# Patient Record
Sex: MALE | Race: WHITE | NOT HISPANIC OR LATINO | Employment: OTHER | URBAN - METROPOLITAN AREA
[De-identification: names, ages, dates, MRNs, and addresses within clinical notes are randomized per-mention and may not be internally consistent; named-entity substitution may affect disease eponyms.]

---

## 2019-12-29 ENCOUNTER — HOSPITAL ENCOUNTER (EMERGENCY)
Facility: HOSPITAL | Age: 54
Discharge: ANOTHER HOSPITAL | End: 2019-12-29
Attending: FAMILY MEDICINE

## 2019-12-29 ENCOUNTER — HOSPITAL ENCOUNTER (INPATIENT)
Facility: HOSPITAL | Age: 54
LOS: 5 days | Discharge: HOME | DRG: 286 | End: 2020-01-03
Attending: EMERGENCY MEDICINE | Admitting: FAMILY MEDICINE

## 2019-12-29 ENCOUNTER — CLINICAL SUPPORT (OUTPATIENT)
Dept: CARDIOLOGY | Facility: HOSPITAL | Age: 54
DRG: 286 | End: 2019-12-29
Attending: FAMILY MEDICINE

## 2019-12-29 VITALS
DIASTOLIC BLOOD PRESSURE: 96 MMHG | RESPIRATION RATE: 26 BRPM | HEART RATE: 103 BPM | WEIGHT: 267 LBS | OXYGEN SATURATION: 94 % | HEIGHT: 71 IN | TEMPERATURE: 99 F | BODY MASS INDEX: 37.38 KG/M2 | SYSTOLIC BLOOD PRESSURE: 134 MMHG

## 2019-12-29 DIAGNOSIS — I50.30 (HFPEF) HEART FAILURE WITH PRESERVED EJECTION FRACTION: ICD-10-CM

## 2019-12-29 DIAGNOSIS — I48.91 ATRIAL FIBRILLATION, UNSPECIFIED TYPE: ICD-10-CM

## 2019-12-29 DIAGNOSIS — I50.9 ACUTE CONGESTIVE HEART FAILURE, UNSPECIFIED HEART FAILURE TYPE: Primary | ICD-10-CM

## 2019-12-29 DIAGNOSIS — I48.91 ATRIAL FIBRILLATION: ICD-10-CM

## 2019-12-29 DIAGNOSIS — I21.4 NSTEMI (NON-ST ELEVATED MYOCARDIAL INFARCTION): Primary | ICD-10-CM

## 2019-12-29 DIAGNOSIS — I48.91 ATRIAL FIBRILLATION WITH RVR: ICD-10-CM

## 2019-12-29 DIAGNOSIS — I10 HYPERTENSION, UNSPECIFIED TYPE: ICD-10-CM

## 2019-12-29 DIAGNOSIS — R73.9 HYPERGLYCEMIA: ICD-10-CM

## 2019-12-29 DIAGNOSIS — I48.91 A-FIB: ICD-10-CM

## 2019-12-29 DIAGNOSIS — R07.9 CHEST PAIN: ICD-10-CM

## 2019-12-29 DIAGNOSIS — I16.1 HYPERTENSIVE EMERGENCY: ICD-10-CM

## 2019-12-29 DIAGNOSIS — R07.9 CHEST PAIN, UNSPECIFIED TYPE: ICD-10-CM

## 2019-12-29 PROBLEM — Z72.0 TOBACCO ABUSE: Status: ACTIVE | Noted: 2019-12-29

## 2019-12-29 PROBLEM — E11.9 NEWLY DIAGNOSED DIABETES: Status: ACTIVE | Noted: 2019-12-29

## 2019-12-29 PROBLEM — G47.33 OSA (OBSTRUCTIVE SLEEP APNEA): Status: ACTIVE | Noted: 2019-12-29

## 2019-12-29 PROBLEM — K59.00 CONSTIPATION: Status: ACTIVE | Noted: 2019-12-29

## 2019-12-29 PROBLEM — R79.89 ELEVATED TROPONIN: Status: ACTIVE | Noted: 2019-12-29

## 2019-12-29 LAB
ACETONE BLD-MCNC: NEGATIVE MG/DL
ALBUMIN SERPL BCP-MCNC: 3.5 G/DL (ref 3.5–5.2)
ALBUMIN SERPL BCP-MCNC: 3.5 G/DL (ref 3.5–5.2)
ALLENS TEST: ABNORMAL
ALLENS TEST: ABNORMAL
ALP SERPL-CCNC: 66 U/L (ref 55–135)
ALP SERPL-CCNC: 72 U/L (ref 55–135)
ALT SERPL W/O P-5'-P-CCNC: 31 U/L (ref 10–44)
ALT SERPL W/O P-5'-P-CCNC: 32 U/L (ref 10–44)
AMPHET+METHAMPHET UR QL: NEGATIVE
ANION GAP SERPL CALC-SCNC: 9 MMOL/L (ref 8–16)
ANION GAP SERPL CALC-SCNC: 9 MMOL/L (ref 8–16)
AORTIC ROOT ANNULUS: 4.35 CM
AORTIC VALVE CUSP SEPERATION: 2.11 CM
APTT PPP: 30.7 SEC (ref 23.6–33.3)
AST SERPL-CCNC: 22 U/L (ref 10–40)
AST SERPL-CCNC: 25 U/L (ref 10–40)
AV INDEX (PROSTH): 0.76
AV MEAN GRADIENT: 3 MMHG
AV PEAK GRADIENT: 7 MMHG
AV VALVE AREA: 2.76 CM2
AV VELOCITY RATIO: 67.99
B-OH-BUTYR BLD STRIP-SCNC: 0.1 MMOL/L (ref 0–0.5)
BACTERIA #/AREA URNS HPF: NORMAL /HPF
BARBITURATES UR QL SCN>200 NG/ML: NEGATIVE
BASOPHILS # BLD AUTO: 0.01 K/UL (ref 0–0.2)
BASOPHILS # BLD AUTO: 0.02 K/UL (ref 0–0.2)
BASOPHILS NFR BLD: 0.1 % (ref 0–1.9)
BASOPHILS NFR BLD: 0.3 % (ref 0–1.9)
BENZODIAZ UR QL SCN>200 NG/ML: NEGATIVE
BILIRUB SERPL-MCNC: 0.6 MG/DL (ref 0.1–1)
BILIRUB SERPL-MCNC: 0.8 MG/DL (ref 0.1–1)
BILIRUB UR QL STRIP: NEGATIVE
BILIRUB UR QL STRIP: NEGATIVE
BNP SERPL-MCNC: 1032 PG/ML (ref 0–99)
BSA FOR ECHO PROCEDURE: 2.46 M2
BUN SERPL-MCNC: 21 MG/DL (ref 6–20)
BUN SERPL-MCNC: 22 MG/DL (ref 6–20)
BZE UR QL SCN: NEGATIVE
CALCIUM SERPL-MCNC: 8.6 MG/DL (ref 8.7–10.5)
CALCIUM SERPL-MCNC: 8.8 MG/DL (ref 8.7–10.5)
CANNABINOIDS UR QL SCN: NORMAL
CHLORIDE SERPL-SCNC: 101 MMOL/L (ref 95–110)
CHLORIDE SERPL-SCNC: 97 MMOL/L (ref 95–110)
CLARITY UR: CLEAR
CLARITY UR: CLEAR
CO2 SERPL-SCNC: 26 MMOL/L (ref 23–29)
CO2 SERPL-SCNC: 27 MMOL/L (ref 23–29)
COLOR UR: YELLOW
COLOR UR: YELLOW
CREAT SERPL-MCNC: 1.4 MG/DL (ref 0.5–1.4)
CREAT SERPL-MCNC: 1.4 MG/DL (ref 0.5–1.4)
CREAT UR-MCNC: 43.6 MG/DL (ref 23–375)
CV ECHO LV RWT: 0.59 CM
DELSYS: ABNORMAL
DELSYS: ABNORMAL
DIFFERENTIAL METHOD: ABNORMAL
DIFFERENTIAL METHOD: ABNORMAL
DOP CALC AO PEAK VEL: 1.3 M/S
DOP CALC AO VTI: 18.86 CM
DOP CALC LVOT AREA: 3.6 CM2
DOP CALC LVOT DIAMETER: 2.15 CM
DOP CALC LVOT PEAK VEL: 88.39 M/S
DOP CALC LVOT STROKE VOLUME: 51.96 CM3
DOP CALCLVOT PEAK VEL VTI: 14.32 CM
E WAVE DECELERATION TIME: 108.69 MSEC
E/E' RATIO: 15.86 M/S
ECHO LV POSTERIOR WALL: 1.77 CM (ref 0.6–1.1)
EOSINOPHIL # BLD AUTO: 0 K/UL (ref 0–0.5)
EOSINOPHIL # BLD AUTO: 0.1 K/UL (ref 0–0.5)
EOSINOPHIL NFR BLD: 0.4 % (ref 0–8)
EOSINOPHIL NFR BLD: 0.7 % (ref 0–8)
ERYTHROCYTE [DISTWIDTH] IN BLOOD BY AUTOMATED COUNT: 13.9 % (ref 11.5–14.5)
ERYTHROCYTE [DISTWIDTH] IN BLOOD BY AUTOMATED COUNT: 14 % (ref 11.5–14.5)
EST. GFR  (AFRICAN AMERICAN): >60 ML/MIN/1.73 M^2
EST. GFR  (AFRICAN AMERICAN): >60 ML/MIN/1.73 M^2
EST. GFR  (NON AFRICAN AMERICAN): 55.8 ML/MIN/1.73 M^2
EST. GFR  (NON AFRICAN AMERICAN): 55.8 ML/MIN/1.73 M^2
ESTIMATED AVG GLUCOSE: 335 MG/DL (ref 68–131)
ESTIMATED AVG GLUCOSE: 335 MG/DL (ref 68–131)
FIO2: 40
FLOW: 15
FRACTIONAL SHORTENING: 9 % (ref 28–44)
GLUCOSE SERPL-MCNC: 102 MG/DL (ref 70–110)
GLUCOSE SERPL-MCNC: 103 MG/DL (ref 70–110)
GLUCOSE SERPL-MCNC: 134 MG/DL (ref 70–110)
GLUCOSE SERPL-MCNC: 143 MG/DL (ref 70–110)
GLUCOSE SERPL-MCNC: 182 MG/DL (ref 70–110)
GLUCOSE SERPL-MCNC: 192 MG/DL (ref 70–110)
GLUCOSE SERPL-MCNC: 218 MG/DL (ref 70–110)
GLUCOSE SERPL-MCNC: 240 MG/DL (ref 70–110)
GLUCOSE SERPL-MCNC: 259 MG/DL (ref 70–110)
GLUCOSE SERPL-MCNC: 269 MG/DL (ref 70–110)
GLUCOSE SERPL-MCNC: 567 MG/DL (ref 70–110)
GLUCOSE UR QL STRIP: ABNORMAL
GLUCOSE UR QL STRIP: ABNORMAL
HBA1C MFR BLD HPLC: 13.3 % (ref 4.5–6.2)
HBA1C MFR BLD HPLC: 13.3 % (ref 4.5–6.2)
HCO3 UR-SCNC: 27.3 MMOL/L (ref 24–28)
HCO3 UR-SCNC: 27.8 MMOL/L (ref 24–28)
HCT VFR BLD AUTO: 38.4 % (ref 40–54)
HCT VFR BLD AUTO: 39.2 % (ref 40–54)
HGB BLD-MCNC: 12.4 G/DL (ref 14–18)
HGB BLD-MCNC: 12.4 G/DL (ref 14–18)
HGB UR QL STRIP: ABNORMAL
HGB UR QL STRIP: NEGATIVE
IMM GRANULOCYTES # BLD AUTO: 0.02 K/UL (ref 0–0.04)
IMM GRANULOCYTES # BLD AUTO: 0.03 K/UL (ref 0–0.04)
IMM GRANULOCYTES NFR BLD AUTO: 0.3 % (ref 0–0.5)
IMM GRANULOCYTES NFR BLD AUTO: 0.4 % (ref 0–0.5)
INFLUENZA A, MOLECULAR: NEGATIVE
INFLUENZA B, MOLECULAR: NEGATIVE
INR PPP: 1.2
INTERVENTRICULAR SEPTUM: 1.77 CM (ref 0.6–1.1)
KETONES UR QL STRIP: NEGATIVE
KETONES UR QL STRIP: NEGATIVE
LEFT ATRIUM SIZE: 4.05 CM
LEFT INTERNAL DIMENSION IN SYSTOLE: 5.44 CM (ref 2.1–4)
LEFT VENTRICLE DIASTOLIC VOLUME INDEX: 91.26 ML/M2
LEFT VENTRICLE DIASTOLIC VOLUME: 217.6 ML
LEFT VENTRICLE MASS INDEX: 227 G/M2
LEFT VENTRICLE SYSTOLIC VOLUME INDEX: 75.7 ML/M2
LEFT VENTRICLE SYSTOLIC VOLUME: 180.6 ML
LEFT VENTRICULAR INTERNAL DIMENSION IN DIASTOLE: 5.99 CM (ref 3.5–6)
LEFT VENTRICULAR MASS: 541.9 G
LEUKOCYTE ESTERASE UR QL STRIP: NEGATIVE
LEUKOCYTE ESTERASE UR QL STRIP: NEGATIVE
LV LATERAL E/E' RATIO: 13.88 M/S
LV SEPTAL E/E' RATIO: 18.5 M/S
LYMPHOCYTES # BLD AUTO: 1.2 K/UL (ref 1–4.8)
LYMPHOCYTES # BLD AUTO: 1.3 K/UL (ref 1–4.8)
LYMPHOCYTES NFR BLD: 15.4 % (ref 18–48)
LYMPHOCYTES NFR BLD: 17.3 % (ref 18–48)
MAGNESIUM SERPL-MCNC: 1.7 MG/DL (ref 1.6–2.6)
MAGNESIUM SERPL-MCNC: 1.8 MG/DL (ref 1.6–2.6)
MCH RBC QN AUTO: 25.9 PG (ref 27–31)
MCH RBC QN AUTO: 26.3 PG (ref 27–31)
MCHC RBC AUTO-ENTMCNC: 31.6 G/DL (ref 32–36)
MCHC RBC AUTO-ENTMCNC: 32.3 G/DL (ref 32–36)
MCV RBC AUTO: 82 FL (ref 82–98)
MCV RBC AUTO: 82 FL (ref 82–98)
MICROSCOPIC COMMENT: NORMAL
MODE: ABNORMAL
MONOCYTES # BLD AUTO: 0.6 K/UL (ref 0.3–1)
MONOCYTES # BLD AUTO: 0.6 K/UL (ref 0.3–1)
MONOCYTES NFR BLD: 7.4 % (ref 4–15)
MONOCYTES NFR BLD: 8.1 % (ref 4–15)
MV PEAK E VEL: 1.11 M/S
NEUTROPHILS # BLD AUTO: 5.5 K/UL (ref 1.8–7.7)
NEUTROPHILS # BLD AUTO: 5.7 K/UL (ref 1.8–7.7)
NEUTROPHILS NFR BLD: 74 % (ref 38–73)
NEUTROPHILS NFR BLD: 75.6 % (ref 38–73)
NITRITE UR QL STRIP: NEGATIVE
NITRITE UR QL STRIP: NEGATIVE
NRBC BLD-RTO: 0 /100 WBC
NRBC BLD-RTO: 0 /100 WBC
OPIATES UR QL SCN: NEGATIVE
PCO2 BLDA: 44.5 MMHG (ref 35–45)
PCO2 BLDA: 49.3 MMHG (ref 35–45)
PCP UR QL SCN>25 NG/ML: NEGATIVE
PH SMN: 7.35 [PH] (ref 7.35–7.45)
PH SMN: 7.4 [PH] (ref 7.35–7.45)
PH UR STRIP: 6 [PH] (ref 5–8)
PH UR STRIP: 6 [PH] (ref 5–8)
PHOSPHATE SERPL-MCNC: 3.6 MG/DL (ref 2.7–4.5)
PISA TR MAX VEL: 2.59 M/S
PLATELET # BLD AUTO: 168 K/UL (ref 150–350)
PLATELET # BLD AUTO: 171 K/UL (ref 150–350)
PMV BLD AUTO: 13.6 FL (ref 9.2–12.9)
PMV BLD AUTO: ABNORMAL FL (ref 9.2–12.9)
PO2 BLDA: 74 MMHG (ref 80–100)
PO2 BLDA: 84 MMHG (ref 80–100)
POC BE: 2 MMOL/L
POC BE: 3 MMOL/L
POC SATURATED O2: 95 % (ref 95–100)
POC SATURATED O2: 96 % (ref 95–100)
POC TCO2: 29 MMOL/L (ref 23–27)
POC TCO2: 29 MMOL/L (ref 23–27)
POCT GLUCOSE: 184 MG/DL (ref 70–110)
POCT GLUCOSE: 455 MG/DL (ref 70–110)
POTASSIUM SERPL-SCNC: 3.3 MMOL/L (ref 3.5–5.1)
POTASSIUM SERPL-SCNC: 3.6 MMOL/L (ref 3.5–5.1)
POTASSIUM SERPL-SCNC: 4 MMOL/L (ref 3.5–5.1)
POTASSIUM SERPL-SCNC: 4.3 MMOL/L (ref 3.5–5.1)
PROT SERPL-MCNC: 6.7 G/DL (ref 6–8.4)
PROT SERPL-MCNC: 7 G/DL (ref 6–8.4)
PROT UR QL STRIP: NEGATIVE
PROT UR QL STRIP: NEGATIVE
PROTHROMBIN TIME: 14.9 SEC (ref 10.6–14.8)
RA PRESSURE: 8 MMHG
RBC # BLD AUTO: 4.71 M/UL (ref 4.6–6.2)
RBC # BLD AUTO: 4.79 M/UL (ref 4.6–6.2)
SAMPLE: ABNORMAL
SAMPLE: ABNORMAL
SITE: ABNORMAL
SITE: ABNORMAL
SODIUM SERPL-SCNC: 132 MMOL/L (ref 136–145)
SODIUM SERPL-SCNC: 137 MMOL/L (ref 136–145)
SP GR UR STRIP: 1.01 (ref 1–1.03)
SP GR UR STRIP: 1.02 (ref 1–1.03)
SPECIMEN SOURCE: NORMAL
SQUAMOUS #/AREA URNS HPF: 1 /HPF
TDI LATERAL: 0.08 M/S
TDI SEPTAL: 0.06 M/S
TDI: 0.07 M/S
TOXICOLOGY INFORMATION: NORMAL
TR MAX PG: 27 MMHG
TROPONIN I SERPL DL<=0.01 NG/ML-MCNC: 0.37 NG/ML
TROPONIN I SERPL DL<=0.01 NG/ML-MCNC: 0.39 NG/ML
TROPONIN I SERPL DL<=0.01 NG/ML-MCNC: 0.4 NG/ML (ref 0.02–0.5)
TROPONIN I SERPL DL<=0.01 NG/ML-MCNC: 0.43 NG/ML (ref 0.02–0.5)
TSH SERPL DL<=0.005 MIU/L-ACNC: 3.44 UIU/ML (ref 0.34–5.6)
TV REST PULMONARY ARTERY PRESSURE: 35 MMHG
URN SPEC COLLECT METH UR: ABNORMAL
URN SPEC COLLECT METH UR: ABNORMAL
UROBILINOGEN UR STRIP-ACNC: NEGATIVE EU/DL
UROBILINOGEN UR STRIP-ACNC: NEGATIVE EU/DL
WBC # BLD AUTO: 7.44 K/UL (ref 3.9–12.7)
WBC # BLD AUTO: 7.54 K/UL (ref 3.9–12.7)
YEAST URNS QL MICRO: NORMAL

## 2019-12-29 PROCEDURE — 99900035 HC TECH TIME PER 15 MIN (STAT)

## 2019-12-29 PROCEDURE — 84484 ASSAY OF TROPONIN QUANT: CPT | Mod: 91

## 2019-12-29 PROCEDURE — 25000003 PHARM REV CODE 250: Performed by: FAMILY MEDICINE

## 2019-12-29 PROCEDURE — 63600175 PHARM REV CODE 636 W HCPCS: Performed by: EMERGENCY MEDICINE

## 2019-12-29 PROCEDURE — 99285 EMERGENCY DEPT VISIT HI MDM: CPT | Mod: 25

## 2019-12-29 PROCEDURE — 82010 KETONE BODYS QUAN: CPT

## 2019-12-29 PROCEDURE — 84132 ASSAY OF SERUM POTASSIUM: CPT | Mod: 91

## 2019-12-29 PROCEDURE — 83880 ASSAY OF NATRIURETIC PEPTIDE: CPT

## 2019-12-29 PROCEDURE — 87205 SMEAR GRAM STAIN: CPT

## 2019-12-29 PROCEDURE — 93005 ELECTROCARDIOGRAM TRACING: CPT

## 2019-12-29 PROCEDURE — 85025 COMPLETE CBC W/AUTO DIFF WBC: CPT | Mod: 91

## 2019-12-29 PROCEDURE — 94761 N-INVAS EAR/PLS OXIMETRY MLT: CPT

## 2019-12-29 PROCEDURE — 85025 COMPLETE CBC W/AUTO DIFF WBC: CPT

## 2019-12-29 PROCEDURE — 85730 THROMBOPLASTIN TIME PARTIAL: CPT

## 2019-12-29 PROCEDURE — 25000003 PHARM REV CODE 250

## 2019-12-29 PROCEDURE — 82803 BLOOD GASES ANY COMBINATION: CPT

## 2019-12-29 PROCEDURE — 71045 X-RAY EXAM CHEST 1 VIEW: CPT | Mod: 26,,, | Performed by: RADIOLOGY

## 2019-12-29 PROCEDURE — 36415 COLL VENOUS BLD VENIPUNCTURE: CPT

## 2019-12-29 PROCEDURE — 82962 GLUCOSE BLOOD TEST: CPT

## 2019-12-29 PROCEDURE — 84443 ASSAY THYROID STIM HORMONE: CPT

## 2019-12-29 PROCEDURE — 93306 TTE W/DOPPLER COMPLETE: CPT

## 2019-12-29 PROCEDURE — 71045 X-RAY EXAM CHEST 1 VIEW: CPT | Mod: TC,FY

## 2019-12-29 PROCEDURE — 80307 DRUG TEST PRSMV CHEM ANLYZR: CPT

## 2019-12-29 PROCEDURE — 96374 THER/PROPH/DIAG INJ IV PUSH: CPT

## 2019-12-29 PROCEDURE — 84132 ASSAY OF SERUM POTASSIUM: CPT

## 2019-12-29 PROCEDURE — 63600175 PHARM REV CODE 636 W HCPCS: Performed by: FAMILY MEDICINE

## 2019-12-29 PROCEDURE — 25000003 PHARM REV CODE 250: Performed by: EMERGENCY MEDICINE

## 2019-12-29 PROCEDURE — 74176 CT ABDOMEN PELVIS WITHOUT CONTRAST: ICD-10-PCS | Mod: 26,,, | Performed by: RADIOLOGY

## 2019-12-29 PROCEDURE — 96375 TX/PRO/DX INJ NEW DRUG ADDON: CPT

## 2019-12-29 PROCEDURE — 96376 TX/PRO/DX INJ SAME DRUG ADON: CPT

## 2019-12-29 PROCEDURE — 84100 ASSAY OF PHOSPHORUS: CPT

## 2019-12-29 PROCEDURE — 96361 HYDRATE IV INFUSION ADD-ON: CPT

## 2019-12-29 PROCEDURE — 85610 PROTHROMBIN TIME: CPT

## 2019-12-29 PROCEDURE — 83735 ASSAY OF MAGNESIUM: CPT

## 2019-12-29 PROCEDURE — 81000 URINALYSIS NONAUTO W/SCOPE: CPT | Mod: 59

## 2019-12-29 PROCEDURE — 71045 XR CHEST AP PORTABLE: ICD-10-PCS | Mod: 26,,, | Performed by: RADIOLOGY

## 2019-12-29 PROCEDURE — 20000000 HC ICU ROOM

## 2019-12-29 PROCEDURE — 74176 CT ABD & PELVIS W/O CONTRAST: CPT | Mod: TC

## 2019-12-29 PROCEDURE — C9399 UNCLASSIFIED DRUGS OR BIOLOG: HCPCS | Performed by: FAMILY MEDICINE

## 2019-12-29 PROCEDURE — 99900026 HC AIRWAY MAINTENANCE (STAT)

## 2019-12-29 PROCEDURE — 27000221 HC OXYGEN, UP TO 24 HOURS

## 2019-12-29 PROCEDURE — 80053 COMPREHEN METABOLIC PANEL: CPT | Mod: 91

## 2019-12-29 PROCEDURE — 80053 COMPREHEN METABOLIC PANEL: CPT

## 2019-12-29 PROCEDURE — 87070 CULTURE OTHR SPECIMN AEROBIC: CPT

## 2019-12-29 PROCEDURE — 87502 INFLUENZA DNA AMP PROBE: CPT

## 2019-12-29 PROCEDURE — 96365 THER/PROPH/DIAG IV INF INIT: CPT

## 2019-12-29 PROCEDURE — 74176 CT ABD & PELVIS W/O CONTRAST: CPT | Mod: 26,,, | Performed by: RADIOLOGY

## 2019-12-29 PROCEDURE — 82009 KETONE BODYS QUAL: CPT

## 2019-12-29 PROCEDURE — 63600175 PHARM REV CODE 636 W HCPCS: Performed by: INTERNAL MEDICINE

## 2019-12-29 PROCEDURE — 81003 URINALYSIS AUTO W/O SCOPE: CPT

## 2019-12-29 PROCEDURE — 96372 THER/PROPH/DIAG INJ SC/IM: CPT

## 2019-12-29 PROCEDURE — 83036 HEMOGLOBIN GLYCOSYLATED A1C: CPT

## 2019-12-29 PROCEDURE — 96366 THER/PROPH/DIAG IV INF ADDON: CPT

## 2019-12-29 PROCEDURE — 36600 WITHDRAWAL OF ARTERIAL BLOOD: CPT

## 2019-12-29 RX ORDER — MORPHINE SULFATE 4 MG/ML
2 INJECTION, SOLUTION INTRAMUSCULAR; INTRAVENOUS
Status: COMPLETED | OUTPATIENT
Start: 2019-12-29 | End: 2019-12-29

## 2019-12-29 RX ORDER — IBUPROFEN 200 MG
24 TABLET ORAL
Status: DISCONTINUED | OUTPATIENT
Start: 2019-12-29 | End: 2020-01-02

## 2019-12-29 RX ORDER — POTASSIUM CHLORIDE 7.45 MG/ML
60 INJECTION INTRAVENOUS
Status: DISCONTINUED | OUTPATIENT
Start: 2019-12-29 | End: 2020-01-03 | Stop reason: HOSPADM

## 2019-12-29 RX ORDER — ONDANSETRON 2 MG/ML
4 INJECTION INTRAMUSCULAR; INTRAVENOUS EVERY 8 HOURS PRN
Status: DISCONTINUED | OUTPATIENT
Start: 2019-12-29 | End: 2020-01-03 | Stop reason: HOSPADM

## 2019-12-29 RX ORDER — FUROSEMIDE 10 MG/ML
60 INJECTION INTRAMUSCULAR; INTRAVENOUS
Status: COMPLETED | OUTPATIENT
Start: 2019-12-29 | End: 2019-12-29

## 2019-12-29 RX ORDER — LANOLIN ALCOHOL/MO/W.PET/CERES
800 CREAM (GRAM) TOPICAL
Status: DISCONTINUED | OUTPATIENT
Start: 2019-12-29 | End: 2020-01-03 | Stop reason: HOSPADM

## 2019-12-29 RX ORDER — POTASSIUM CHLORIDE 7.45 MG/ML
80 INJECTION INTRAVENOUS
Status: DISCONTINUED | OUTPATIENT
Start: 2019-12-29 | End: 2020-01-03 | Stop reason: HOSPADM

## 2019-12-29 RX ORDER — METOPROLOL SUCCINATE 25 MG/1
25 TABLET, EXTENDED RELEASE ORAL DAILY
Status: DISCONTINUED | OUTPATIENT
Start: 2019-12-29 | End: 2019-12-31

## 2019-12-29 RX ORDER — POTASSIUM CHLORIDE 20 MEQ/15ML
60 SOLUTION ORAL
Status: DISCONTINUED | OUTPATIENT
Start: 2019-12-29 | End: 2020-01-03 | Stop reason: HOSPADM

## 2019-12-29 RX ORDER — GLUCAGON 1 MG
1 KIT INJECTION
Status: DISCONTINUED | OUTPATIENT
Start: 2019-12-29 | End: 2020-01-02

## 2019-12-29 RX ORDER — ENOXAPARIN SODIUM 300 MG/3ML
1 INJECTION INTRAVENOUS; SUBCUTANEOUS
Status: COMPLETED | OUTPATIENT
Start: 2019-12-29 | End: 2019-12-29

## 2019-12-29 RX ORDER — ACETAMINOPHEN 325 MG/1
650 TABLET ORAL EVERY 4 HOURS PRN
Status: DISCONTINUED | OUTPATIENT
Start: 2019-12-29 | End: 2020-01-03 | Stop reason: HOSPADM

## 2019-12-29 RX ORDER — POTASSIUM CHLORIDE 7.45 MG/ML
40 INJECTION INTRAVENOUS
Status: DISCONTINUED | OUTPATIENT
Start: 2019-12-29 | End: 2020-01-03 | Stop reason: HOSPADM

## 2019-12-29 RX ORDER — DILTIAZEM HYDROCHLORIDE 5 MG/ML
10 INJECTION INTRAVENOUS
Status: COMPLETED | OUTPATIENT
Start: 2019-12-29 | End: 2019-12-29

## 2019-12-29 RX ORDER — SODIUM,POTASSIUM PHOSPHATES 280-250MG
2 POWDER IN PACKET (EA) ORAL
Status: DISCONTINUED | OUTPATIENT
Start: 2019-12-29 | End: 2020-01-03 | Stop reason: HOSPADM

## 2019-12-29 RX ORDER — ENOXAPARIN SODIUM 300 MG/3ML
1 INJECTION INTRAVENOUS; SUBCUTANEOUS
Status: DISCONTINUED | OUTPATIENT
Start: 2019-12-29 | End: 2019-12-29

## 2019-12-29 RX ORDER — IBUPROFEN 200 MG
16 TABLET ORAL
Status: DISCONTINUED | OUTPATIENT
Start: 2019-12-29 | End: 2020-01-02

## 2019-12-29 RX ORDER — NITROGLYCERIN 20 MG/100ML
5 INJECTION INTRAVENOUS CONTINUOUS
Status: DISCONTINUED | OUTPATIENT
Start: 2019-12-29 | End: 2019-12-29 | Stop reason: HOSPADM

## 2019-12-29 RX ORDER — FUROSEMIDE 10 MG/ML
40 INJECTION INTRAMUSCULAR; INTRAVENOUS DAILY
Status: DISCONTINUED | OUTPATIENT
Start: 2019-12-29 | End: 2020-01-01

## 2019-12-29 RX ORDER — MORPHINE SULFATE 4 MG/ML
INJECTION, SOLUTION INTRAMUSCULAR; INTRAVENOUS
Status: COMPLETED
Start: 2019-12-29 | End: 2019-12-29

## 2019-12-29 RX ORDER — ENOXAPARIN SODIUM 100 MG/ML
1 INJECTION SUBCUTANEOUS
Status: DISCONTINUED | OUTPATIENT
Start: 2019-12-29 | End: 2019-12-30

## 2019-12-29 RX ORDER — MAGNESIUM SULFATE HEPTAHYDRATE 40 MG/ML
4 INJECTION, SOLUTION INTRAVENOUS
Status: DISCONTINUED | OUTPATIENT
Start: 2019-12-29 | End: 2020-01-03 | Stop reason: HOSPADM

## 2019-12-29 RX ORDER — DILTIAZEM HCL 1 MG/ML
5 INJECTION, SOLUTION INTRAVENOUS CONTINUOUS
Status: DISCONTINUED | OUTPATIENT
Start: 2019-12-29 | End: 2020-01-03 | Stop reason: HOSPADM

## 2019-12-29 RX ORDER — HYDRALAZINE HYDROCHLORIDE 20 MG/ML
5 INJECTION INTRAMUSCULAR; INTRAVENOUS EVERY 4 HOURS PRN
Status: DISCONTINUED | OUTPATIENT
Start: 2019-12-29 | End: 2019-12-31

## 2019-12-29 RX ORDER — ENOXAPARIN SODIUM 100 MG/ML
1 INJECTION SUBCUTANEOUS
Status: DISCONTINUED | OUTPATIENT
Start: 2019-12-29 | End: 2019-12-29

## 2019-12-29 RX ORDER — POLYETHYLENE GLYCOL 3350 17 G/17G
17 POWDER, FOR SOLUTION ORAL 2 TIMES DAILY
Status: DISCONTINUED | OUTPATIENT
Start: 2019-12-29 | End: 2020-01-03 | Stop reason: HOSPADM

## 2019-12-29 RX ORDER — NITROGLYCERIN 20 MG/100ML
INJECTION INTRAVENOUS
Status: COMPLETED
Start: 2019-12-29 | End: 2019-12-29

## 2019-12-29 RX ORDER — ENOXAPARIN SODIUM 100 MG/ML
INJECTION SUBCUTANEOUS
Status: COMPLETED
Start: 2019-12-29 | End: 2019-12-29

## 2019-12-29 RX ORDER — CHLORHEXIDINE GLUCONATE ORAL RINSE 1.2 MG/ML
15 SOLUTION DENTAL 2 TIMES DAILY
Status: DISCONTINUED | OUTPATIENT
Start: 2019-12-29 | End: 2020-01-03 | Stop reason: HOSPADM

## 2019-12-29 RX ORDER — MAGNESIUM SULFATE HEPTAHYDRATE 40 MG/ML
2 INJECTION, SOLUTION INTRAVENOUS
Status: DISCONTINUED | OUTPATIENT
Start: 2019-12-29 | End: 2020-01-03 | Stop reason: HOSPADM

## 2019-12-29 RX ORDER — INSULIN ASPART 100 [IU]/ML
0-5 INJECTION, SOLUTION INTRAVENOUS; SUBCUTANEOUS
Status: DISCONTINUED | OUTPATIENT
Start: 2019-12-29 | End: 2020-01-03 | Stop reason: HOSPADM

## 2019-12-29 RX ORDER — HYDRALAZINE HYDROCHLORIDE 20 MG/ML
10 INJECTION INTRAMUSCULAR; INTRAVENOUS EVERY 4 HOURS PRN
Status: DISCONTINUED | OUTPATIENT
Start: 2019-12-29 | End: 2019-12-31

## 2019-12-29 RX ORDER — ENOXAPARIN SODIUM 100 MG/ML
40 INJECTION SUBCUTANEOUS
Status: DISCONTINUED | OUTPATIENT
Start: 2019-12-29 | End: 2019-12-29

## 2019-12-29 RX ORDER — DILTIAZEM HYDROCHLORIDE 5 MG/ML
20 INJECTION INTRAVENOUS
Status: COMPLETED | OUTPATIENT
Start: 2019-12-29 | End: 2019-12-29

## 2019-12-29 RX ORDER — PANTOPRAZOLE SODIUM 40 MG/1
40 TABLET, DELAYED RELEASE ORAL DAILY
Status: DISCONTINUED | OUTPATIENT
Start: 2019-12-29 | End: 2020-01-03 | Stop reason: HOSPADM

## 2019-12-29 RX ORDER — SODIUM CHLORIDE 0.9 % (FLUSH) 0.9 %
10 SYRINGE (ML) INJECTION
Status: DISCONTINUED | OUTPATIENT
Start: 2019-12-29 | End: 2020-01-03 | Stop reason: HOSPADM

## 2019-12-29 RX ORDER — METOPROLOL SUCCINATE 25 MG/1
25 TABLET, EXTENDED RELEASE ORAL DAILY
Status: DISCONTINUED | OUTPATIENT
Start: 2019-12-30 | End: 2019-12-29

## 2019-12-29 RX ORDER — POTASSIUM CHLORIDE 20 MEQ/15ML
40 SOLUTION ORAL
Status: DISCONTINUED | OUTPATIENT
Start: 2019-12-29 | End: 2020-01-03 | Stop reason: HOSPADM

## 2019-12-29 RX ORDER — MORPHINE SULFATE 4 MG/ML
4 INJECTION, SOLUTION INTRAMUSCULAR; INTRAVENOUS EVERY 4 HOURS PRN
Status: DISCONTINUED | OUTPATIENT
Start: 2019-12-29 | End: 2020-01-03 | Stop reason: HOSPADM

## 2019-12-29 RX ORDER — LORAZEPAM 2 MG/ML
0.5 INJECTION INTRAMUSCULAR
Status: COMPLETED | OUTPATIENT
Start: 2019-12-29 | End: 2019-12-29

## 2019-12-29 RX ORDER — LORAZEPAM 2 MG/ML
1 INJECTION INTRAMUSCULAR
Status: DISCONTINUED | OUTPATIENT
Start: 2019-12-29 | End: 2019-12-29

## 2019-12-29 RX ORDER — MUPIROCIN 20 MG/G
OINTMENT TOPICAL 2 TIMES DAILY
Status: DISCONTINUED | OUTPATIENT
Start: 2019-12-29 | End: 2020-01-03 | Stop reason: HOSPADM

## 2019-12-29 RX ADMIN — SODIUM CHLORIDE 4 UNITS/HR: 9 INJECTION, SOLUTION INTRAVENOUS at 11:12

## 2019-12-29 RX ADMIN — MORPHINE SULFATE 2 MG: 4 INJECTION INTRAVENOUS at 04:12

## 2019-12-29 RX ADMIN — SODIUM CHLORIDE 4 UNITS/HR: 9 INJECTION, SOLUTION INTRAVENOUS at 09:12

## 2019-12-29 RX ADMIN — HYDRALAZINE HYDROCHLORIDE 5 MG: 20 INJECTION INTRAMUSCULAR; INTRAVENOUS at 04:12

## 2019-12-29 RX ADMIN — SODIUM CHLORIDE 300 ML: 0.9 INJECTION, SOLUTION INTRAVENOUS at 02:12

## 2019-12-29 RX ADMIN — FUROSEMIDE 60 MG: 10 INJECTION, SOLUTION INTRAMUSCULAR; INTRAVENOUS at 03:12

## 2019-12-29 RX ADMIN — NITROGLYCERIN 5 MCG/MIN: 20 INJECTION INTRAVENOUS at 03:12

## 2019-12-29 RX ADMIN — DILTIAZEM HYDROCHLORIDE 5 MG/HR: 5 INJECTION INTRAVENOUS at 07:12

## 2019-12-29 RX ADMIN — MAGNESIUM OXIDE 800 MG: 400 TABLET ORAL at 07:12

## 2019-12-29 RX ADMIN — ENOXAPARIN SODIUM 120 MG: 100 INJECTION SUBCUTANEOUS at 09:12

## 2019-12-29 RX ADMIN — PANTOPRAZOLE SODIUM 40 MG: 40 TABLET, DELAYED RELEASE ORAL at 01:12

## 2019-12-29 RX ADMIN — ENOXAPARIN SODIUM 120 MG: 300 INJECTION INTRAVENOUS; SUBCUTANEOUS at 02:12

## 2019-12-29 RX ADMIN — LORAZEPAM 0.5 MG: 2 INJECTION INTRAMUSCULAR; INTRAVENOUS at 02:12

## 2019-12-29 RX ADMIN — MUPIROCIN 1 TUBE: 20 OINTMENT TOPICAL at 09:12

## 2019-12-29 RX ADMIN — DILTIAZEM HYDROCHLORIDE 20 MG: 5 INJECTION INTRAVENOUS at 07:12

## 2019-12-29 RX ADMIN — SODIUM CHLORIDE 4 UNITS/HR: 9 INJECTION, SOLUTION INTRAVENOUS at 10:12

## 2019-12-29 RX ADMIN — CHLORHEXIDINE GLUCONATE 15 ML: 1.2 RINSE ORAL at 09:12

## 2019-12-29 RX ADMIN — METOPROLOL SUCCINATE 25 MG: 25 TABLET, FILM COATED, EXTENDED RELEASE ORAL at 07:12

## 2019-12-29 RX ADMIN — INSULIN DETEMIR 10 UNITS: 100 INJECTION, SOLUTION SUBCUTANEOUS at 07:12

## 2019-12-29 RX ADMIN — POTASSIUM CHLORIDE 40 MEQ: 20 SOLUTION ORAL at 07:12

## 2019-12-29 RX ADMIN — FUROSEMIDE 40 MG: 10 INJECTION, SOLUTION INTRAMUSCULAR; INTRAVENOUS at 09:12

## 2019-12-29 RX ADMIN — INSULIN HUMAN 8 UNITS: 100 INJECTION, SOLUTION PARENTERAL at 03:12

## 2019-12-29 RX ADMIN — DILTIAZEM HYDROCHLORIDE 10 MG: 5 INJECTION INTRAVENOUS at 02:12

## 2019-12-29 RX ADMIN — POTASSIUM CHLORIDE 60 MEQ: 7.46 INJECTION, SOLUTION INTRAVENOUS at 01:12

## 2019-12-29 NOTE — HPI
53 yo M with PMH of HTN and non-compliance with medical therapy presented with CP.  Transferred from [place] for new a-fib with RVR  Pt has been experiencing SOB for approx a week  At first it was SOB with productive cough, rhinorrhea, muscle aches, nausea and vomiting  He took OTC flu medication  However those symptoms have resolved; only the SOB remains  Last night before going to ED, he began having L-sided CP with radiation up to his neck  Described as pressure, constant, no aggravating or alleviating factors  Smokes 1 PPD  Reports palpitations  No HA, dizziness, no new n/v  Is unaware if he has been experiencing edema  No known hx CAD, HF, a-fib, but pt doesn't follow with physicians regularly   At [place], pt was transferred after receiving lovenox; pt sent with nitro drip and a-fib was uncontrolled on arrival  Pt started on diltiazem drip in our ED; nitro drip discontinued  Pt also found to have glucose >500 in OSH; insulin drip started there and continued here

## 2019-12-29 NOTE — PLAN OF CARE
12/29/19 1300   PRE-TX-O2   SpO2 (!) 94 %   Pulse 86   Resp 19   BP (!) 147/101   Preset CPAP/BiPAP Settings   Mode Of Delivery CPAP;auto titrating   $ Initial CPAP/BiPAP Setup? Yes   $ Is patient using? No/refused   Size of Mask Large   Sized Appropriately? Yes   Equipment Type DeVilbiss   Airway Device Type large full face mask   Humidifier not applicable   t fitted for cpap ,did not want to wear yet

## 2019-12-29 NOTE — ED NOTES
Pt accepted to [facility] ED by Dr Person. Call report to [phone]. Spoke to Leidy at [facility]. Informed Dr Sanchez states pt is ready for transport-will not wait for CT result. Set transport as Priority 1.

## 2019-12-29 NOTE — H&P
Replaced by [facility]  History & Physical    Patient Name: Jarret Jessica  MRN: [id number]  Admission Date: 12/29/2019  Attending Physician: Kiara Nunez MD     Patient information was obtained from patient, parent, relative(s), past medical records, ED physician and ER records.     Subjective:     Principal Problem:Atrial fibrillation with RVR    Chief Complaint:   Chief Complaint   Patient presents with    Chest Pain     x several days    Shortness of Breath        HPI: 53 yo M with PMH of HTN and non-compliance with medical therapy presented with CP.  Transferred from [place] for new a-fib with RVR  Pt has been experiencing SOB for approx a week  At first it was SOB with productive cough, rhinorrhea, muscle aches, nausea and vomiting  He took OTC flu medication  However those symptoms have resolved; only the SOB remains  Last night before going to ED, he began having L-sided CP with radiation up to his neck  Described as pressure, constant, no aggravating or alleviating factors  Smokes 1 PPD  Reports palpitations  No HA, dizziness, no new n/v  Is unaware if he has been experiencing edema  No known hx CAD, HF, a-fib, but pt doesn't follow with physicians regularly   At [place], pt was transferred after receiving lovenox; pt sent with nitro drip and a-fib was uncontrolled on arrival  Pt started on diltiazem drip in our ED; nitro drip discontinued  Pt also found to have glucose >500 in OSH; insulin drip started there and continued here  Pt with elevated troponin noted; cardiology has been consulted      Past Medical History:   Diagnosis Date    Diverticulitis 2010    Hypertension    DM2    No past surgical history on file.    Review of patient's allergies indicates:  No Known Allergies    Current Facility-Administered Medications on File Prior to Encounter   Medication    [COMPLETED] diltiaZEM injection 10 mg    [COMPLETED] enoxaparin (LOVENOX) 40 mg/0.4 mL injection    [COMPLETED]  enoxaparin (LOVENOX) 80 mg/0.8 mL injection    [COMPLETED] enoxaparin injection 120 mg    [COMPLETED] furosemide injection 60 mg    [COMPLETED] insulin regular injection 8 Units    [COMPLETED] lorazepam injection 0.5 mg    [COMPLETED] morphine injection 2 mg    [COMPLETED] morphine injection 2 mg    [COMPLETED] sodium chloride 0.9% bolus 300 mL    [DISCONTINUED] enoxaparin injection 40 mg    [DISCONTINUED] insulin regular injection 12 Units    [DISCONTINUED] lorazepam injection 1 mg    [DISCONTINUED] nitroGLYCERIN 50 mg in dextrose 5 % 250 mL infusion (TITRATING)     Current Outpatient Medications on File Prior to Encounter   Medication Sig    DM/p-ephed/acetaminoph/doxylam (NYQUIL ORAL) Take 15 mLs by mouth once as needed.    DM/pseudoephed/acetaminoph/cpm (MARILEE-SELTZER PLUS COLD/COUGH ORAL) Take 1 tablet by mouth once as needed.     Family History     Problem Relation (Age of Onset)    Heart disease Father    Hypertension Mother        Tobacco Use    Smoking status: Current Every Day Smoker     Packs/day: 1.50     Years: 30.00     Pack years: 45.00     Types: Cigarettes    Smokeless tobacco: Never Used   Substance and Sexual Activity    Alcohol use: Not Currently    Drug use: No    Sexual activity: Not Currently     Review of Systems     All systems reviewed and are negative except as noted per above.    Objective:     Vital Signs (Most Recent):  Temp: 98 °F (36.7 °C) (12/29/19 1129)  Pulse: 86 (12/29/19 1300)  Resp: 19 (12/29/19 1300)  BP: (!) 147/101 (12/29/19 1300)  SpO2: (!) 94 % (12/29/19 1300) Vital Signs (24h Range):  Temp:  [98 °F (36.7 °C)-98.5 °F (36.9 °C)] 98 °F (36.7 °C)  Pulse:  [] 86  Resp:  [14-35] 19  SpO2:  [87 %-97 %] 94 %  BP: (119-195)/() 147/101     Weight: 119 kg (262 lb 5.6 oz)  Body mass index is 36.59 kg/m².    Physical Exam      Gen: alert, responsive; on facemask  HEENT:  Eyes - no pallor  External ears with no lesions  Nares patent  Mouth - lips  chapped  CV: irregular  Lungs: rales, increased WOB  Abd: +BS, soft, NT, ND  Ext: +edema  Skin: warm, dry  Neuro: grossly intact  Psych: pleasant     Significant Labs:   ABGs:   Recent Labs   Lab 12/29/19  0746   PH 7.403   PCO2 44.5   HCO3 27.8   POCSATURATED 95   BE 3     CBC:   Recent Labs   Lab 12/29/19 0225 12/29/19  0739   WBC 7.44 7.54   HGB 12.4* 12.4*   HCT 39.2* 38.4*    168     CMP:   Recent Labs   Lab 12/29/19  0225 12/29/19  0739 12/29/19  1233   * 137  --    K 4.3 4.0 3.3*   CL 97 101  --    CO2 26 27  --    * 259*  --    BUN 22* 21*  --    CREATININE 1.4 1.4  --    CALCIUM 8.8 8.6*  --    PROT 6.7 7.0  --    ALBUMIN 3.5 3.5  --    BILITOT 0.6 0.8  --    ALKPHOS 72 66  --    AST 25 22  --    ALT 32 31  --    ANIONGAP 9 9  --    EGFRNONAA 55.8* 55.8*  --      Magnesium:   Recent Labs   Lab 12/29/19  1233   MG 1.8     Troponin:   Recent Labs   Lab 12/29/19  0534 12/29/19  0739 12/29/19  1233   TROPONINI 0.40 0.392* 0.373*     Acetone:  Negative    EKG:  A-fib with competing junctional pacemaker; t wave abnl  Echo in progress  CXR:  Interstitial opacities suggestive of pulmonary edema with enlarged heart  CT A/P: no acute abdominal findings; pulmonary edema, R<L small pleural effusions    Assessment/Plan:     Patient Active Problem List   Diagnosis    Essential hypertension, benign    Atrial fibrillation with RVR    Elevated troponin    Hyperglycemia    Newly diagnosed diabetes    Constipation    Acute congestive heart failure    BRI (obstructive sleep apnea)    NSTEMI (non-ST elevated myocardial infarction)     New A-fib with RVR  Complicated by new, acute CHF  Complicated by Elevated Troponin with suspected NSTEMI vs demand ischemia  - diltiazem gtt  - cardiology consulted, thank you  - trending troponin  - echo in progress  - lasix  - hydralazine prn  - lovenox started  - ICU monitoring     Diabetes with glucose >500  - insulin gtt  - trending glucose, BMP  - Electrolyte  derangement:  Replacement prn  - A1c  - negative acetone    Suspected BRI  - CPAP QHS    Constipation  - miralax    Diet:  Diabetic, cardiac, 1.5L fluid restriction     VTE Risk Mitigation (From admission, onward)         Ordered     enoxaparin injection 120 mg  Every 12 hours (non-standard times)      12/29/19 6290              Kiara Nunez MD  Department of Hospital Medicine   Critical access hospital

## 2019-12-29 NOTE — ED TRIAGE NOTES
"Patient to ED unaccompanied via POV complaining of chest pain and shortness of breath. Patient states he has had a cough for several weeks and has gradually developed chest pain and shortness of breath. Pain is throughout his chest and is not worsened by deep breath or inspiration.     Patient admits to being seen for his symptoms at approximately 0800 yesterday morning at urgent care and was instructed to come straight to ED, but didn't because "of his own stupidity and stubbornness" per patient's own words. Patient brings 2 ECGS done yesterday which both show atrial fibrillation.   "

## 2019-12-29 NOTE — ED NOTES
[facility] [facility], Dr. Sanchez notified. IV insulin order changed to 8 units.

## 2019-12-29 NOTE — ED NOTES
Patient requests that RN speaks to his sister, Bryanna, over his cell phone who is a cardiac nurse. RN speaks to her as requested. Her number is [phone]

## 2019-12-29 NOTE — CONSULTS
[facility]  Cardiology  Consult Note    Patient Name: Jarret Jessica  MRN: [id number]  Admission Date: 12/29/2019  Hospital Length of Stay: 0 days  Code Status: No Order   Attending Provider: Kiara Nunez MD   Consulting Provider: Gasper Person MD  Primary Care Physician: Primary Doctor No  Principal Problem:Atrial fibrillation with RVR    Patient information was obtained from patient and ER records.     Inpatient consult to Cardiology  Consult performed by: Gasepr Person MD  Consult ordered by: Kiara Nunez MD        Subjective:     REASON FOR CONSULT:  CHF, A fib     HPI:  54-year-old male with a past medical history significant for hypertension and diverticulitis presented to the hospital at [facility] [place] [place] with the complaints of chest pain, shortness of breath.  He reportedly has been having shortness of breath and lower extremity edema for more than 3-4 weeks or so.  He went to the Urgent Care about 3 days ago and was noted to be in atrial fibrillation with a rapid ventricular response and he was advised to go to the emergency room.  He however did not go to the emergency room.  On Saturday night he reportedly had some chest discomfort that radiated up the jaw and subsequently presented to the emergency room at [facility] [place] [place].  With treatment his chest pain resolved and has not had any more recurrence.  He was given IV Lasix and he reportedly is diuresing well and he feels better right now.  He reports palpitations with a sensation of heart racing.  He denies any prior history of stroke or TIA.  His blood sugars were noted to be extremely high.  Past Medical History:   Diagnosis Date    Diverticulitis 2010    Hypertension        No past surgical history on file.    Review of patient's allergies indicates:  No Known Allergies    Current Facility-Administered Medications on File Prior to Encounter   Medication    [COMPLETED] diltiaZEM injection 10 mg     [COMPLETED] enoxaparin (LOVENOX) 40 mg/0.4 mL injection    [COMPLETED] enoxaparin (LOVENOX) 80 mg/0.8 mL injection    [COMPLETED] enoxaparin injection 120 mg    [COMPLETED] furosemide injection 60 mg    [COMPLETED] insulin regular injection 8 Units    [COMPLETED] lorazepam injection 0.5 mg    [COMPLETED] morphine injection 2 mg    [COMPLETED] morphine injection 2 mg    [COMPLETED] sodium chloride 0.9% bolus 300 mL    [DISCONTINUED] enoxaparin injection 40 mg    [DISCONTINUED] insulin regular injection 12 Units    [DISCONTINUED] lorazepam injection 1 mg    [DISCONTINUED] nitroGLYCERIN 50 mg in dextrose 5 % 250 mL infusion (TITRATING)     Current Outpatient Medications on File Prior to Encounter   Medication Sig    DM/p-ephed/acetaminoph/doxylam (NYQUIL ORAL) Take 15 mLs by mouth once as needed.    DM/pseudoephed/acetaminoph/cpm (MARILEE-SELTZER PLUS COLD/COUGH ORAL) Take 1 tablet by mouth once as needed.       Scheduled Meds:   enoxparin  1 mg/kg Subcutaneous Q12H    furosemide (LASIX) IV  40 mg Intravenous Daily    pantoprazole  40 mg Oral Daily    polyethylene glycol  17 g Oral BID     Continuous Infusions:   dilTIAZem 5 mg/hr (12/29/19 0729)    insulin (HUMAN R) infusion (adults)      insulin (HUMAN R) infusion (adults) 4 Units/hr (12/29/19 1024)     PRN Meds:.acetaminophen, dextrose 50%, dextrose 50%, dextrose 50%, dextrose 50%, glucagon (human recombinant), glucose, glucose, hydrALAZINE, hydrALAZINE, morphine, ondansetron, sodium chloride 0.9%    Family History     Problem Relation (Age of Onset)    Heart disease Father    Hypertension Mother          Tobacco Use    Smoking status: Current Every Day Smoker     Packs/day: 1.50     Years: 30.00     Pack years: 45.00     Types: Cigarettes    Smokeless tobacco: Never Used   Substance and Sexual Activity    Alcohol use: Not Currently    Drug use: Admits to marijuana use    Sexual activity: Not Currently       ROS   No significant headaches or  sore throat or runny nose.   No recent changes in vision.   No recent changes in hearing.  No dysphagia or odynophagia.  Reports chest pain and shortness of breath.   Denies any cough or hemoptysis.   Denies any abdominal pain, nausea, vomiting, diarrhea or constipation.   Denies any dysuria or polyuria.   Denies any fevers or chills.   Denies any recent significant weight changes.   Denies bleeding diathesis  Objective:     Vital Signs (Most Recent):  Temp: 98 °F (36.7 °C) (12/29/19 1129)  Pulse: 91 (12/29/19 0744)  Resp: (!) 27 (12/29/19 0744)  BP: (!) 141/90 (12/29/19 0744)  SpO2: 95 % (12/29/19 1128) Vital Signs (24h Range):  Temp:  [98 °F (36.7 °C)-98.5 °F (36.9 °C)] 98 °F (36.7 °C)  Pulse:  [] 91  Resp:  [14-35] 27  SpO2:  [87 %-97 %] 95 %  BP: (119-195)/() 141/90     Weight: 119 kg (262 lb 5.6 oz)  Body mass index is 36.59 kg/m².    SpO2: 95 %  O2 Device (Oxygen Therapy): nasal cannula      Intake/Output Summary (Last 24 hours) at 12/29/2019 1201  Last data filed at 12/29/2019 1024  Gross per 24 hour   Intake 3.27 ml   Output --   Net 3.27 ml       Lines/Drains/Airways     Peripheral Intravenous Line                 Peripheral IV - Single Lumen 12/29/19 0217 18 G Left Antecubital less than 1 day         Peripheral IV - Single Lumen 12/29/19 0320 18 G Right Forearm less than 1 day                Physical Exam  HEENT: Normocephalic, atraumatic, PERRL, Conjunctiva pink, no scleral icterus.   CVS: S1S2+, Irregular no murmurs, rubs or gallops, JVP: Normal.  LUNGS: Clear  ABDOMEN: Soft, NT, BS+  EXTREMITIES: No cyanosis, clubbing. +edema  NEURO: AAO X 3.     Significant Labs:   ABG:   Recent Labs   Lab 12/29/19  0329 12/29/19  0746   PH 7.351 7.403   PCO2 49.3* 44.5   HCO3 27.3 27.8   POCSATURATED 96 95   BE 2 3   , BMP:   Recent Labs   Lab 12/29/19 0225 12/29/19  0739   * 259*   * 137   K 4.3 4.0   CL 97 101   CO2 26 27   BUN 22* 21*   CREATININE 1.4 1.4   CALCIUM 8.8 8.6*   , CMP    Recent Labs   Lab 12/29/19 0225 12/29/19  0739   * 137   K 4.3 4.0   CL 97 101   CO2 26 27   * 259*   BUN 22* 21*   CREATININE 1.4 1.4   CALCIUM 8.8 8.6*   PROT 6.7 7.0   ALBUMIN 3.5 3.5   BILITOT 0.6 0.8   ALKPHOS 72 66   AST 25 22   ALT 32 31   ANIONGAP 9 9   ESTGFRAFRICA >60.0 >60.0   EGFRNONAA 55.8* 55.8*   , CBC   Recent Labs   Lab 12/29/19 0225 12/29/19  0739   WBC 7.44 7.54   HGB 12.4* 12.4*   HCT 39.2* 38.4*    168   , INR   Recent Labs   Lab 12/29/19  0739   INR 1.2   , Lipid Panel No results for input(s): CHOL, HDL, LDLCALC, TRIG, CHOLHDL in the last 48 hours. and Troponin   Recent Labs   Lab 12/29/19 0225 12/29/19  0534 12/29/19  0739   TROPONINI 0.43 0.40 0.392*       Significant Imaging: Reviewed  Assessment and Plan:     IMPRESSION:  CHF.  Possibly with reduced LVEF.  Elevated troponin.  Etiology?  Non-STEMI with subendocardial ischemia versus secondary to congestive heart failure.  Diabetes mellitus.  Poorly controlled.    Hypertension.  Tobacco abuse.  Marijuana abuse.      RECOMMENDATIONS:  1.  Check echocardiogram.  2.  Continue with Cardizem GTT for now.  3.  Monitor serial cardiac biomarkers.  4.  Once volume status is better will start him on a metoprolol.  5.  Add ACE-inhibitor to the current regimen.  6.  Check HbA1c and fasting lipid profile.  7.  Continue IV diuresis.  8.  Continue Lovenox.  9.  Further decisions to follow based upon the hospital course.    Thank you for your consult. I will follow-up with patient. Please contact us if you have any additional questions.    Gasper Person MD  Cardiology   [facility]

## 2019-12-29 NOTE — H&P (VIEW-ONLY)
[facility]  Cardiology  Consult Note    Patient Name: Jarret Jessica  MRN: [id number]  Admission Date: 12/29/2019  Hospital Length of Stay: 0 days  Code Status: No Order   Attending Provider: Kiara Nunez MD   Consulting Provider: Gasper Person MD  Primary Care Physician: Primary Doctor No  Principal Problem:Atrial fibrillation with RVR    Patient information was obtained from patient and ER records.     Inpatient consult to Cardiology  Consult performed by: Gasper Person MD  Consult ordered by: Kiara Nunez MD        Subjective:     REASON FOR CONSULT:  CHF, A fib     HPI:  54-year-old male with a past medical history significant for hypertension and diverticulitis presented to the hospital at [facility] with the complaints of chest pain, shortness of breath.  He reportedly has been having shortness of breath and lower extremity edema for more than 3-4 weeks or so.  He went to the Urgent Care about 3 days ago and was noted to be in atrial fibrillation with a rapid ventricular response and he was advised to go to the emergency room.  He however did not go to the emergency room.  On Saturday night he reportedly had some chest discomfort that radiated up the jaw and subsequently presented to the emergency room at [facility].  With treatment his chest pain resolved and has not had any more recurrence.  He was given IV Lasix and he reportedly is diuresing well and he feels better right now.  He reports palpitations with a sensation of heart racing.  He denies any prior history of stroke or TIA.  His blood sugars were noted to be extremely high.  Past Medical History:   Diagnosis Date    Diverticulitis 2010    Hypertension        No past surgical history on file.    Review of patient's allergies indicates:  No Known Allergies    Current Facility-Administered Medications on File Prior to Encounter   Medication    [COMPLETED] diltiaZEM injection 10 mg     [COMPLETED] enoxaparin (LOVENOX) 40 mg/0.4 mL injection    [COMPLETED] enoxaparin (LOVENOX) 80 mg/0.8 mL injection    [COMPLETED] enoxaparin injection 120 mg    [COMPLETED] furosemide injection 60 mg    [COMPLETED] insulin regular injection 8 Units    [COMPLETED] lorazepam injection 0.5 mg    [COMPLETED] morphine injection 2 mg    [COMPLETED] morphine injection 2 mg    [COMPLETED] sodium chloride 0.9% bolus 300 mL    [DISCONTINUED] enoxaparin injection 40 mg    [DISCONTINUED] insulin regular injection 12 Units    [DISCONTINUED] lorazepam injection 1 mg    [DISCONTINUED] nitroGLYCERIN 50 mg in dextrose 5 % 250 mL infusion (TITRATING)     Current Outpatient Medications on File Prior to Encounter   Medication Sig    DM/p-ephed/acetaminoph/doxylam (NYQUIL ORAL) Take 15 mLs by mouth once as needed.    DM/pseudoephed/acetaminoph/cpm (MARILEE-SELTZER PLUS COLD/COUGH ORAL) Take 1 tablet by mouth once as needed.       Scheduled Meds:   enoxparin  1 mg/kg Subcutaneous Q12H    furosemide (LASIX) IV  40 mg Intravenous Daily    pantoprazole  40 mg Oral Daily    polyethylene glycol  17 g Oral BID     Continuous Infusions:   dilTIAZem 5 mg/hr (12/29/19 0729)    insulin (HUMAN R) infusion (adults)      insulin (HUMAN R) infusion (adults) 4 Units/hr (12/29/19 1024)     PRN Meds:.acetaminophen, dextrose 50%, dextrose 50%, dextrose 50%, dextrose 50%, glucagon (human recombinant), glucose, glucose, hydrALAZINE, hydrALAZINE, morphine, ondansetron, sodium chloride 0.9%    Family History     Problem Relation (Age of Onset)    Heart disease Father    Hypertension Mother          Tobacco Use    Smoking status: Current Every Day Smoker     Packs/day: 1.50     Years: 30.00     Pack years: 45.00     Types: Cigarettes    Smokeless tobacco: Never Used   Substance and Sexual Activity    Alcohol use: Not Currently    Drug use: Admits to marijuana use    Sexual activity: Not Currently       ROS   No significant headaches or  sore throat or runny nose.   No recent changes in vision.   No recent changes in hearing.  No dysphagia or odynophagia.  Reports chest pain and shortness of breath.   Denies any cough or hemoptysis.   Denies any abdominal pain, nausea, vomiting, diarrhea or constipation.   Denies any dysuria or polyuria.   Denies any fevers or chills.   Denies any recent significant weight changes.   Denies bleeding diathesis  Objective:     Vital Signs (Most Recent):  Temp: 98 °F (36.7 °C) (12/29/19 1129)  Pulse: 91 (12/29/19 0744)  Resp: (!) 27 (12/29/19 0744)  BP: (!) 141/90 (12/29/19 0744)  SpO2: 95 % (12/29/19 1128) Vital Signs (24h Range):  Temp:  [98 °F (36.7 °C)-98.5 °F (36.9 °C)] 98 °F (36.7 °C)  Pulse:  [] 91  Resp:  [14-35] 27  SpO2:  [87 %-97 %] 95 %  BP: (119-195)/() 141/90     Weight: 119 kg (262 lb 5.6 oz)  Body mass index is 36.59 kg/m².    SpO2: 95 %  O2 Device (Oxygen Therapy): nasal cannula      Intake/Output Summary (Last 24 hours) at 12/29/2019 1201  Last data filed at 12/29/2019 1024  Gross per 24 hour   Intake 3.27 ml   Output --   Net 3.27 ml       Lines/Drains/Airways     Peripheral Intravenous Line                 Peripheral IV - Single Lumen 12/29/19 0217 18 G Left Antecubital less than 1 day         Peripheral IV - Single Lumen 12/29/19 0320 18 G Right Forearm less than 1 day                Physical Exam  HEENT: Normocephalic, atraumatic, PERRL, Conjunctiva pink, no scleral icterus.   CVS: S1S2+, Irregular no murmurs, rubs or gallops, JVP: Normal.  LUNGS: Clear  ABDOMEN: Soft, NT, BS+  EXTREMITIES: No cyanosis, clubbing. +edema  NEURO: AAO X 3.     Significant Labs:   ABG:   Recent Labs   Lab 12/29/19  0329 12/29/19  0746   PH 7.351 7.403   PCO2 49.3* 44.5   HCO3 27.3 27.8   POCSATURATED 96 95   BE 2 3   , BMP:   Recent Labs   Lab 12/29/19 0225 12/29/19  0739   * 259*   * 137   K 4.3 4.0   CL 97 101   CO2 26 27   BUN 22* 21*   CREATININE 1.4 1.4   CALCIUM 8.8 8.6*   , CMP    Recent Labs   Lab 12/29/19 0225 12/29/19  0739   * 137   K 4.3 4.0   CL 97 101   CO2 26 27   * 259*   BUN 22* 21*   CREATININE 1.4 1.4   CALCIUM 8.8 8.6*   PROT 6.7 7.0   ALBUMIN 3.5 3.5   BILITOT 0.6 0.8   ALKPHOS 72 66   AST 25 22   ALT 32 31   ANIONGAP 9 9   ESTGFRAFRICA >60.0 >60.0   EGFRNONAA 55.8* 55.8*   , CBC   Recent Labs   Lab 12/29/19 0225 12/29/19  0739   WBC 7.44 7.54   HGB 12.4* 12.4*   HCT 39.2* 38.4*    168   , INR   Recent Labs   Lab 12/29/19  0739   INR 1.2   , Lipid Panel No results for input(s): CHOL, HDL, LDLCALC, TRIG, CHOLHDL in the last 48 hours. and Troponin   Recent Labs   Lab 12/29/19 0225 12/29/19  0534 12/29/19  0739   TROPONINI 0.43 0.40 0.392*       Significant Imaging: Reviewed  Assessment and Plan:     IMPRESSION:  CHF.  Possibly with reduced LVEF.  Elevated troponin.  Etiology?  Non-STEMI with subendocardial ischemia versus secondary to congestive heart failure.  Diabetes mellitus.  Poorly controlled.    Hypertension.  Tobacco abuse.  Marijuana abuse.      RECOMMENDATIONS:  1.  Check echocardiogram.  2.  Continue with Cardizem GTT for now.  3.  Monitor serial cardiac biomarkers.  4.  Once volume status is better will start him on a metoprolol.  5.  Add ACE-inhibitor to the current regimen.  6.  Check HbA1c and fasting lipid profile.  7.  Continue IV diuresis.  8.  Continue Lovenox.  9.  Further decisions to follow based upon the hospital course.    Thank you for your consult. I will follow-up with patient. Please contact us if you have any additional questions.    Gasper Person MD  Cardiology   [facility]

## 2019-12-29 NOTE — ED NOTES
Physician at bedside.       Her shoulder MRI is normal. Per NP. PT was here for an appt and it was relayed to her of the results of her MRI of her shoulder. She has verbalized understanding.

## 2019-12-29 NOTE — ED NOTES
Pt moved from bed 5 to bed 1. Pt belongings placed in belongings bag. Wallet and keys were placed in the left shoe. Witnessed by charge. Pt alerted.

## 2019-12-29 NOTE — SUBJECTIVE & OBJECTIVE
Past Medical History:   Diagnosis Date    Diverticulitis 2010    Hypertension    DM2    No past surgical history on file.    Review of patient's allergies indicates:  No Known Allergies    Current Facility-Administered Medications on File Prior to Encounter   Medication    [COMPLETED] diltiaZEM injection 10 mg    [COMPLETED] enoxaparin (LOVENOX) 40 mg/0.4 mL injection    [COMPLETED] enoxaparin (LOVENOX) 80 mg/0.8 mL injection    [COMPLETED] enoxaparin injection 120 mg    [COMPLETED] furosemide injection 60 mg    [COMPLETED] insulin regular injection 8 Units    [COMPLETED] lorazepam injection 0.5 mg    [COMPLETED] morphine injection 2 mg    [COMPLETED] morphine injection 2 mg    [COMPLETED] sodium chloride 0.9% bolus 300 mL    [DISCONTINUED] enoxaparin injection 40 mg    [DISCONTINUED] insulin regular injection 12 Units    [DISCONTINUED] lorazepam injection 1 mg    [DISCONTINUED] nitroGLYCERIN 50 mg in dextrose 5 % 250 mL infusion (TITRATING)     Current Outpatient Medications on File Prior to Encounter   Medication Sig    DM/p-ephed/acetaminoph/doxylam (NYQUIL ORAL) Take 15 mLs by mouth once as needed.    DM/pseudoephed/acetaminoph/cpm (MARILEE-SELTZER PLUS COLD/COUGH ORAL) Take 1 tablet by mouth once as needed.     Family History     Problem Relation (Age of Onset)    Heart disease Father    Hypertension Mother        Tobacco Use    Smoking status: Current Every Day Smoker     Packs/day: 1.50     Years: 30.00     Pack years: 45.00     Types: Cigarettes    Smokeless tobacco: Never Used   Substance and Sexual Activity    Alcohol use: Not Currently    Drug use: No    Sexual activity: Not Currently     Review of Systems     All systems reviewed and are negative except as noted per above.    Objective:     Vital Signs (Most Recent):  Temp: 98 °F (36.7 °C) (12/29/19 1129)  Pulse: 86 (12/29/19 1300)  Resp: 19 (12/29/19 1300)  BP: (!) 147/101 (12/29/19 1300)  SpO2: (!) 94 % (12/29/19 1300) Vital Signs  (24h Range):  Temp:  [98 °F (36.7 °C)-98.5 °F (36.9 °C)] 98 °F (36.7 °C)  Pulse:  [] 86  Resp:  [14-35] 19  SpO2:  [87 %-97 %] 94 %  BP: (119-195)/() 147/101     Weight: 119 kg (262 lb 5.6 oz)  Body mass index is 36.59 kg/m².    Physical Exam      Gen: alert, responsive; on facemask  HEENT:  Eyes - no pallor  External ears with no lesions  Nares patent  Mouth - lips chapped  CV: irregular  Lungs: rales, increased WOB  Abd: +BS, soft, NT, ND  Ext: +edema  Skin: warm, dry  Neuro: grossly intact  Psych: pleasant     Significant Labs:   ABGs:   Recent Labs   Lab 12/29/19  0746   PH 7.403   PCO2 44.5   HCO3 27.8   POCSATURATED 95   BE 3     CBC:   Recent Labs   Lab 12/29/19 0225 12/29/19  0739   WBC 7.44 7.54   HGB 12.4* 12.4*   HCT 39.2* 38.4*    168     CMP:   Recent Labs   Lab 12/29/19 0225 12/29/19  0739 12/29/19  1233   * 137  --    K 4.3 4.0 3.3*   CL 97 101  --    CO2 26 27  --    * 259*  --    BUN 22* 21*  --    CREATININE 1.4 1.4  --    CALCIUM 8.8 8.6*  --    PROT 6.7 7.0  --    ALBUMIN 3.5 3.5  --    BILITOT 0.6 0.8  --    ALKPHOS 72 66  --    AST 25 22  --    ALT 32 31  --    ANIONGAP 9 9  --    EGFRNONAA 55.8* 55.8*  --      Magnesium:   Recent Labs   Lab 12/29/19  1233   MG 1.8     Troponin:   Recent Labs   Lab 12/29/19  0534 12/29/19  0739 12/29/19  1233   TROPONINI 0.40 0.392* 0.373*     Acetone:  Negative    EKG:  A-fib with competing junctional pacemaker; t wave abnl  Echo in progress  CXR:  Interstitial opacities suggestive of pulmonary edema with enlarged heart  CT A/P: no acute abdominal findings; pulmonary edema, R<L small pleural effusions

## 2019-12-29 NOTE — ED NOTES
Spoke with Valencia at [facility]-informed patient was next on list. Valencia reported [facility] set up transfer as Priority 3. Informed transfer was set up as a Priority 1 per Dr Sanchez request.

## 2019-12-29 NOTE — ED PROVIDER NOTES
Encounter Date: 12/29/2019       History     Chief Complaint   Patient presents with    Chest Pain     x several days    Shortness of Breath     54-year-old male transfer from [facility] who was noted to have been in atrial fib with RVR and who had some questionable chest pain. Patient has had a history of both diabetes and hypertension which has been untreated.  Patient denies any history caffeine excess.  He has had some complaints of shortness of breath since around the 25th of December.  He admits to smoking 1 pack daily.  He has had cough occasionally productive of brown mucus.  No wheezing.  No nausea vomiting. No history of any thyroid disease.  No known history of coronary disease.  At [facility] the patient did receive Lovenox and was transferred or and a nitroglycerin drip still with atrial fib and RVR.  He did receive a 10 mg bolus of Cardizem at that facility.        Review of patient's allergies indicates:  No Known Allergies  Past Medical History:   Diagnosis Date    Diverticulitis 2010    Hypertension      No past surgical history on file.  Family History   Problem Relation Age of Onset    Heart disease Father         MI at 74    Hypertension Mother      Social History     Tobacco Use    Smoking status: Current Every Day Smoker     Packs/day: 1.50     Years: 30.00     Pack years: 45.00     Types: Cigarettes    Smokeless tobacco: Never Used   Substance Use Topics    Alcohol use: Not Currently    Drug use: No     Review of Systems   Constitutional: Negative for appetite change, chills, diaphoresis and fever.   HENT: Negative for congestion, ear pain, rhinorrhea, sinus pain and sore throat.    Eyes: Negative for pain.   Respiratory: Positive for cough and shortness of breath. Negative for wheezing and stridor.    Cardiovascular: Positive for chest pain and palpitations.   Gastrointestinal: Negative for abdominal pain, constipation, diarrhea, nausea and vomiting.   Genitourinary:  Negative for difficulty urinating, flank pain, frequency and hematuria.   Skin: Negative for pallor, rash and wound.   Neurological: Negative for headaches.   All other systems reviewed and are negative.      Physical Exam     Initial Vitals [12/29/19 0633]   BP Pulse Resp Temp SpO2   (!) 195/119 (!) 122 (!) 30 -- 96 %      MAP       --         Physical Exam    Constitutional: He appears well-developed and well-nourished. He is not diaphoretic. No distress.   Lethargic but easily aroused   HENT:   Head: Normocephalic and atraumatic.   Right Ear: External ear normal.   Left Ear: External ear normal.   Nose: Nose normal.   Mouth/Throat: Oropharynx is clear and moist.   Eyes: Conjunctivae and EOM are normal. Pupils are equal, round, and reactive to light. Right eye exhibits no discharge. Left eye exhibits no discharge. No scleral icterus.   Neck: Normal range of motion. Neck supple. No tracheal deviation present. No JVD present.   Cardiovascular: Normal rate, normal heart sounds and intact distal pulses. An irregularly irregular rhythm present.  Exam reveals no gallop and no friction rub.    No murmur heard.  Pulmonary/Chest: Breath sounds normal. No respiratory distress. He has no wheezes. He has no rhonchi. He has no rales. He exhibits no tenderness.   Abdominal: Soft. Bowel sounds are normal. He exhibits no distension and no mass. There is no tenderness. There is no rebound and no guarding.   Genitourinary: Penis normal.   Musculoskeletal: Normal range of motion. He exhibits no edema or tenderness.   Lymphadenopathy:     He has no cervical adenopathy.   Neurological: He is alert and oriented to person, place, and time. He has normal strength and normal reflexes. No cranial nerve deficit or sensory deficit. GCS score is 15. GCS eye subscore is 4. GCS verbal subscore is 5. GCS motor subscore is 6.   Skin: Skin is warm and dry. Capillary refill takes less than 2 seconds. No rash noted. No erythema. No pallor.    Psychiatric: He has a normal mood and affect. His behavior is normal. Judgment and thought content normal.         ED Course   Procedures  Labs Reviewed   CBC W/ AUTO DIFFERENTIAL   COMPREHENSIVE METABOLIC PANEL   PROTIME-INR   APTT   TROPONIN I   TSH   URINALYSIS, REFLEX TO URINE CULTURE          Imaging Results    None                       Attending Attestation:             Attending ED Notes:   The diagnostic studies include EKG showing atrial fib.  The labs showed a blood sugar of 259 with the unremarkable BUN and creatinine.  Troponin was elevated at 0.392 but in the same ranges that at [facility].  H&H is acceptable at 12.4 and 38.4.  Because of the patient's lethargy and on high-flow O2, in ABG showed no evidence of hypercarbia.  During the ED course the patient was bolused with 20 mg of Cardizem and started a 5 milligram/hour drip.  Both the ventricular rate improved as well as blood pressure initially.  The patient additionally received 4 units of regular insulin IV for his elevated blood sugar.  Hospital Medicine is consulted and Dr. Nunez will be admitting the patient to ICU.                        Clinical Impression:       ICD-10-CM ICD-9-CM   1. NSTEMI (non-ST elevated myocardial infarction) I21.4 410.70   2. A-fib I48.91 427.31   3. Hypertension, unspecified type I10 401.9   4. Hyperglycemia R73.9 790.29                             Nagi Canada Jr., MD  12/29/19 0997

## 2019-12-29 NOTE — ED NOTES
Spoke with Leidy at [facility]-informed of problem with priority of transfer. Reported that she spoke with Adina, set up as STAT.

## 2019-12-29 NOTE — Clinical Note
115 ml injected throughout the case. 35 mL total wasted during the case. 150 mL total used in the case. 
Airway assessment complete  
Catheter is inserted into the left ventricle. Pressures only. 
Catheter is inserted into the ostial  left coronary artery. Angiography performed of the left coronary arteries in multiple views. Angiography performed via power injection with 8 mL contrast at 4 mL/s. 
Catheter is removed from the aorta. 
Catheter is removed from the aorta. 
Catheter is repositioned to the ostium   left main. Angiography performed of the left coronary arteries in multiple views.
Catheter is repositioned to the ostium   right coronary artery. Angiography performed of the right coronary arteries in multiple views. Angiography performed via power injection with 6 mL contrast at 3 mL/s. 
Catheter is repositioned to the ostium   right coronary artery. Angiography performed of the right coronary arteries in multiple views. Angiography performed via power injection with 6 mL contrast at 3 mL/s. 
Defib pads placed on patient's anterior lateral.  
ID band present and verified. Family is in the lobby. 
Percutaneous stick to the right radial. 
Prepped: right groin and right radial. Prepped with: ChloraPrep. The site was clipped. The patient was draped. 
Pulse oximeter placed on patient's left index finger and left index finger.
Radial band applied to the right radial artery. 8cc's air in band 
The sheath is inserted into the right radial artery. 
Verified procedural consent signed and complete H&P in chart. 
dry, intact, no bleeding and no hematoma. 
 used
EOS 0.07/No

## 2019-12-29 NOTE — ED NOTES
Patient visually more relaxed than presented on arrival to ED. Lying supine on stretcher. Eyes closed, respirations even and unlabored.

## 2019-12-29 NOTE — ED PROVIDER NOTES
Encounter Date: 12/29/2019       History     Chief Complaint   Patient presents with    Shortness of Breath    Chest Pain     54-year-old male presents complaining increased fatigue and shortness of breath for the past few weeks he was seen at urgent care earlier today and had an EKG which demonstrated atrial fibrillation he was directed to proceed to the ER but did not do that till tonight, he has a history of untreated diabetes for possibly years also history of untreated hypertension for years, he has problems obtaining healthcare due to lack of health insurance, he has chest pain which he rates at a 7/10 this pain is nonradiating dull substernal and has been present for over 24 hr        Review of patient's allergies indicates:  No Known Allergies  Past Medical History:   Diagnosis Date    Diverticulitis 2010    Hypertension      History reviewed. No pertinent surgical history.  Family History   Problem Relation Age of Onset    Heart disease Father         MI at 74    Hypertension Mother      Social History     Tobacco Use    Smoking status: Current Every Day Smoker     Packs/day: 1.50     Years: 30.00     Pack years: 45.00     Types: Cigarettes    Smokeless tobacco: Never Used   Substance Use Topics    Alcohol use: Not Currently    Drug use: No     Review of Systems   Constitutional: Positive for activity change and fatigue. Negative for appetite change and fever.   HENT: Positive for congestion. Negative for rhinorrhea, sinus pressure and sore throat.    Respiratory: Positive for cough and shortness of breath.    Cardiovascular: Positive for chest pain. Negative for palpitations and leg swelling.        Positive dyspnea on exertion   Gastrointestinal: Negative for abdominal distention, abdominal pain and nausea.   Genitourinary: Negative for dysuria and frequency.   Musculoskeletal: Negative for back pain.   Skin: Negative for rash.   Neurological: Positive for weakness and light-headedness.    Hematological: Does not bruise/bleed easily.   Psychiatric/Behavioral: Negative for agitation. The patient is nervous/anxious.        Physical Exam     Initial Vitals [12/29/19 0219]   BP Pulse Resp Temp SpO2   (!) 158/136 (!) 126 14 98.5 °F (36.9 °C) (!) 92 %      MAP       --         Physical Exam    Nursing note and vitals reviewed.  Constitutional: He appears well-developed and well-nourished. He is not diaphoretic. No distress.   HENT:   Head: Normocephalic and atraumatic.   Right Ear: External ear normal.   Left Ear: External ear normal.   Nose: Nose normal.   Mouth/Throat: Oropharynx is clear and moist. No oropharyngeal exudate.   Eyes: EOM are normal.   Neck: Normal range of motion. Neck supple. No tracheal deviation present.   Cardiovascular: Normal rate and regular rhythm.   No murmur heard.  Pulmonary/Chest: Breath sounds normal. No stridor. No respiratory distress. He has no wheezes. He has no rales. He exhibits no mass.   Abdominal: Soft. He exhibits no distension and no mass. There is no tenderness. There is no rebound.   Musculoskeletal: Normal range of motion. He exhibits no edema.        Right lower leg: He exhibits no tenderness.   Lymphadenopathy:     He has no cervical adenopathy.   Neurological: He is alert and oriented to person, place, and time. He has normal strength.   Skin: Skin is warm and dry. Capillary refill takes less than 2 seconds. No pallor.   Psychiatric: He has a normal mood and affect.         ED Course   Procedures  Labs Reviewed   CBC W/ AUTO DIFFERENTIAL - Abnormal; Notable for the following components:       Result Value    Hemoglobin 12.4 (*)     Hematocrit 39.2 (*)     Mean Corpuscular Hemoglobin 25.9 (*)     Mean Corpuscular Hemoglobin Conc 31.6 (*)     Gran% 74.0 (*)     Lymph% 17.3 (*)     All other components within normal limits   COMPREHENSIVE METABOLIC PANEL - Abnormal; Notable for the following components:    Sodium 132 (*)     Glucose 567 (*)     BUN, Bld 22 (*)      eGFR if non  55.8 (*)     All other components within normal limits    Narrative:     glucose critical result(s) called and verbal readback obtained from   ABIGAIL Rocha RN by [employer] 12/29/2019 02:56   B-TYPE NATRIURETIC PEPTIDE - Abnormal; Notable for the following components:    BNP 1,032 (*)     All other components within normal limits   URINALYSIS, REFLEX TO URINE CULTURE - Abnormal; Notable for the following components:    Glucose, UA 3+ (*)     All other components within normal limits    Narrative:     Preferred Collection Type->Urine, Clean Catch   POCT GLUCOSE - Abnormal; Notable for the following components:    POCT Glucose 455 (*)     All other components within normal limits   ISTAT PROCEDURE - Abnormal; Notable for the following components:    POC PCO2 49.3 (*)     POC TCO2 29 (*)     All other components within normal limits   TROPONIN I   ACETONE   ACETONE   DRUG SCREEN PANEL, URINE EMERGENCY    Narrative:     Preferred Collection Type->Urine, Clean Catch   URINALYSIS MICROSCOPIC     EKG Readings: (Independently Interpreted)   Initial Reading: No STEMI. Rhythm: Atrial Fibrillation. Heart Rate: 120. Ectopy: No Ectopy. Conduction: Normal. ST Segments: Normal ST Segments. T Waves: Normal.       Imaging Results          CT Abdomen Pelvis  Without Contrast (In process)                X-Ray Chest AP Portable (In process)               X-Rays:   Independently Interpreted Readings:   Other Readings:  Positive cardiomegaly with some evidence of bilateral pulmonary edema    Medical Decision Making:   ED Management:  Patient has improved with treatment in the ED but toward the end of the ED stay developed intermittent crampy bilateral abdominal pain this was investigated with CT of the abdomen  Case was discussed with cardiologist DR HUBER patient accepted in transfer ED to ED                   ED Course as of Dec 29 0539   Sun Dec 29, 2019   0317 Chest pain has improved by 50%    [WK]   0335  Case discussed with hospitalist Dr.C Meade    [WK]   0346 ABG on 4 L pH 7.35, CO 49.3, 02 84    [WK]   0533 CT of the chest was unremarkable for vascular abnormality but did show evidence of congestive heart failure    [WK]      ED Course User Index  [WK] Jae Sanchez MD     The evaluation, management and treatment of this patient involved Critical Care services  amounting to 85 minutes of direct involvement.  This time was exclusive of any billable procedures.           Clinical Impression:       ICD-10-CM ICD-9-CM   1. Acute congestive heart failure, unspecified heart failure type I50.9 428.0   2. Atrial fibrillation, unspecified type I48.91 427.31   3. Hyperglycemia R73.9 790.29   4. Chest pain, unspecified type R07.9 786.50                             Jae Sanchez MD  12/29/19 0519       Jae Sanchez MD  12/29/19 0539       Jae Sanchez MD  12/29/19 0612

## 2019-12-30 LAB
ANION GAP SERPL CALC-SCNC: 10 MMOL/L (ref 8–16)
BASOPHILS # BLD AUTO: 0.02 K/UL (ref 0–0.2)
BASOPHILS NFR BLD: 0.3 % (ref 0–1.9)
BUN SERPL-MCNC: 16 MG/DL (ref 6–20)
CALCIUM SERPL-MCNC: 8.6 MG/DL (ref 8.7–10.5)
CHLORIDE SERPL-SCNC: 101 MMOL/L (ref 95–110)
CHOLEST SERPL-MCNC: 158 MG/DL (ref 120–199)
CHOLEST/HDLC SERPL: 5.3 {RATIO} (ref 2–5)
CO2 SERPL-SCNC: 27 MMOL/L (ref 23–29)
CREAT SERPL-MCNC: 1.2 MG/DL (ref 0.5–1.4)
DIFFERENTIAL METHOD: ABNORMAL
EOSINOPHIL # BLD AUTO: 0.1 K/UL (ref 0–0.5)
EOSINOPHIL NFR BLD: 0.9 % (ref 0–8)
ERYTHROCYTE [DISTWIDTH] IN BLOOD BY AUTOMATED COUNT: 14.1 % (ref 11.5–14.5)
EST. GFR  (AFRICAN AMERICAN): >60 ML/MIN/1.73 M^2
EST. GFR  (NON AFRICAN AMERICAN): >60 ML/MIN/1.73 M^2
GLUCOSE SERPL-MCNC: 189 MG/DL (ref 70–110)
GLUCOSE SERPL-MCNC: 209 MG/DL (ref 70–110)
GLUCOSE SERPL-MCNC: 228 MG/DL (ref 70–110)
GLUCOSE SERPL-MCNC: 236 MG/DL (ref 70–110)
GLUCOSE SERPL-MCNC: 264 MG/DL (ref 70–110)
HCT VFR BLD AUTO: 38.8 % (ref 40–54)
HDLC SERPL-MCNC: 30 MG/DL (ref 40–75)
HDLC SERPL: 19 % (ref 20–50)
HGB BLD-MCNC: 12.4 G/DL (ref 14–18)
IMM GRANULOCYTES # BLD AUTO: 0.02 K/UL (ref 0–0.04)
IMM GRANULOCYTES NFR BLD AUTO: 0.3 % (ref 0–0.5)
LDLC SERPL CALC-MCNC: 108.4 MG/DL (ref 63–159)
LYMPHOCYTES # BLD AUTO: 1.2 K/UL (ref 1–4.8)
LYMPHOCYTES NFR BLD: 17.5 % (ref 18–48)
MAGNESIUM SERPL-MCNC: 1.9 MG/DL (ref 1.6–2.6)
MCH RBC QN AUTO: 26.1 PG (ref 27–31)
MCHC RBC AUTO-ENTMCNC: 32 G/DL (ref 32–36)
MCV RBC AUTO: 82 FL (ref 82–98)
MONOCYTES # BLD AUTO: 0.5 K/UL (ref 0.3–1)
MONOCYTES NFR BLD: 7 % (ref 4–15)
NEUTROPHILS # BLD AUTO: 4.9 K/UL (ref 1.8–7.7)
NEUTROPHILS NFR BLD: 74 % (ref 38–73)
NONHDLC SERPL-MCNC: 128 MG/DL
NRBC BLD-RTO: 0 /100 WBC
PHOSPHATE SERPL-MCNC: 3.4 MG/DL (ref 2.7–4.5)
PLATELET # BLD AUTO: 168 K/UL (ref 150–350)
PMV BLD AUTO: 13.6 FL (ref 9.2–12.9)
POTASSIUM SERPL-SCNC: 4 MMOL/L (ref 3.5–5.1)
RBC # BLD AUTO: 4.75 M/UL (ref 4.6–6.2)
SODIUM SERPL-SCNC: 138 MMOL/L (ref 136–145)
TRIGL SERPL-MCNC: 98 MG/DL (ref 30–150)
TROPONIN I SERPL DL<=0.01 NG/ML-MCNC: 0.34 NG/ML
WBC # BLD AUTO: 6.58 K/UL (ref 3.9–12.7)

## 2019-12-30 PROCEDURE — 63600175 PHARM REV CODE 636 W HCPCS: Performed by: INTERNAL MEDICINE

## 2019-12-30 PROCEDURE — 25000003 PHARM REV CODE 250: Performed by: FAMILY MEDICINE

## 2019-12-30 PROCEDURE — 25000003 PHARM REV CODE 250

## 2019-12-30 PROCEDURE — 94761 N-INVAS EAR/PLS OXIMETRY MLT: CPT

## 2019-12-30 PROCEDURE — 83735 ASSAY OF MAGNESIUM: CPT

## 2019-12-30 PROCEDURE — C1887 CATHETER, GUIDING: HCPCS | Performed by: INTERNAL MEDICINE

## 2019-12-30 PROCEDURE — 84484 ASSAY OF TROPONIN QUANT: CPT

## 2019-12-30 PROCEDURE — 94660 CPAP INITIATION&MGMT: CPT

## 2019-12-30 PROCEDURE — 25000003 PHARM REV CODE 250: Performed by: INTERNAL MEDICINE

## 2019-12-30 PROCEDURE — 27000221 HC OXYGEN, UP TO 24 HOURS

## 2019-12-30 PROCEDURE — 21400001 HC TELEMETRY ROOM

## 2019-12-30 PROCEDURE — 84100 ASSAY OF PHOSPHORUS: CPT

## 2019-12-30 PROCEDURE — 99152 MOD SED SAME PHYS/QHP 5/>YRS: CPT | Performed by: INTERNAL MEDICINE

## 2019-12-30 PROCEDURE — 93005 ELECTROCARDIOGRAM TRACING: CPT

## 2019-12-30 PROCEDURE — 80048 BASIC METABOLIC PNL TOTAL CA: CPT

## 2019-12-30 PROCEDURE — 82962 GLUCOSE BLOOD TEST: CPT

## 2019-12-30 PROCEDURE — 63600175 PHARM REV CODE 636 W HCPCS: Performed by: FAMILY MEDICINE

## 2019-12-30 PROCEDURE — C1769 GUIDE WIRE: HCPCS | Performed by: INTERNAL MEDICINE

## 2019-12-30 PROCEDURE — 85025 COMPLETE CBC W/AUTO DIFF WBC: CPT

## 2019-12-30 PROCEDURE — 99153 MOD SED SAME PHYS/QHP EA: CPT | Performed by: INTERNAL MEDICINE

## 2019-12-30 PROCEDURE — 93458 L HRT ARTERY/VENTRICLE ANGIO: CPT | Performed by: INTERNAL MEDICINE

## 2019-12-30 PROCEDURE — 36415 COLL VENOUS BLD VENIPUNCTURE: CPT

## 2019-12-30 PROCEDURE — 25500020 PHARM REV CODE 255: Performed by: INTERNAL MEDICINE

## 2019-12-30 PROCEDURE — 80061 LIPID PANEL: CPT

## 2019-12-30 PROCEDURE — C1894 INTRO/SHEATH, NON-LASER: HCPCS | Performed by: INTERNAL MEDICINE

## 2019-12-30 PROCEDURE — C9399 UNCLASSIFIED DRUGS OR BIOLOG: HCPCS | Performed by: INTERNAL MEDICINE

## 2019-12-30 PROCEDURE — 20000000 HC ICU ROOM

## 2019-12-30 PROCEDURE — 99900035 HC TECH TIME PER 15 MIN (STAT)

## 2019-12-30 RX ORDER — SODIUM CHLORIDE 9 MG/ML
INJECTION, SOLUTION INTRAVENOUS CONTINUOUS
Status: DISCONTINUED | OUTPATIENT
Start: 2019-12-30 | End: 2019-12-30

## 2019-12-30 RX ORDER — NITROGLYCERIN 0.4 MG/1
0.4 TABLET SUBLINGUAL EVERY 5 MIN PRN
Status: DISCONTINUED | OUTPATIENT
Start: 2019-12-30 | End: 2020-01-03 | Stop reason: HOSPADM

## 2019-12-30 RX ORDER — LISINOPRIL 5 MG/1
5 TABLET ORAL DAILY
Status: DISCONTINUED | OUTPATIENT
Start: 2019-12-30 | End: 2020-01-01

## 2019-12-30 RX ORDER — LIDOCAINE HYDROCHLORIDE 10 MG/ML
INJECTION, SOLUTION EPIDURAL; INFILTRATION; INTRACAUDAL; PERINEURAL
Status: DISCONTINUED | OUTPATIENT
Start: 2019-12-30 | End: 2019-12-30 | Stop reason: HOSPADM

## 2019-12-30 RX ORDER — MIDAZOLAM HYDROCHLORIDE 1 MG/ML
INJECTION INTRAMUSCULAR; INTRAVENOUS
Status: DISCONTINUED | OUTPATIENT
Start: 2019-12-30 | End: 2019-12-30 | Stop reason: HOSPADM

## 2019-12-30 RX ORDER — HYDRALAZINE HYDROCHLORIDE 20 MG/ML
INJECTION INTRAMUSCULAR; INTRAVENOUS
Status: DISCONTINUED | OUTPATIENT
Start: 2019-12-30 | End: 2019-12-30 | Stop reason: HOSPADM

## 2019-12-30 RX ORDER — HEPARIN SODIUM 10000 [USP'U]/ML
INJECTION, SOLUTION INTRAVENOUS; SUBCUTANEOUS
Status: DISCONTINUED | OUTPATIENT
Start: 2019-12-30 | End: 2019-12-30 | Stop reason: HOSPADM

## 2019-12-30 RX ORDER — NITROGLYCERIN 5 MG/ML
INJECTION, SOLUTION INTRAVENOUS
Status: DISCONTINUED | OUTPATIENT
Start: 2019-12-30 | End: 2019-12-30 | Stop reason: HOSPADM

## 2019-12-30 RX ORDER — ASPIRIN 325 MG
325 TABLET, DELAYED RELEASE (ENTERIC COATED) ORAL ONCE
Status: DISCONTINUED | OUTPATIENT
Start: 2019-12-30 | End: 2019-12-30 | Stop reason: HOSPADM

## 2019-12-30 RX ORDER — DIGOXIN 0.25 MG/ML
250 INJECTION INTRAMUSCULAR; INTRAVENOUS ONCE
Status: COMPLETED | OUTPATIENT
Start: 2019-12-30 | End: 2019-12-30

## 2019-12-30 RX ORDER — HYDROCODONE BITARTRATE AND ACETAMINOPHEN 5; 325 MG/1; MG/1
1 TABLET ORAL EVERY 4 HOURS PRN
Status: DISCONTINUED | OUTPATIENT
Start: 2019-12-30 | End: 2020-01-03 | Stop reason: HOSPADM

## 2019-12-30 RX ORDER — ENOXAPARIN SODIUM 100 MG/ML
1 INJECTION SUBCUTANEOUS
Status: DISCONTINUED | OUTPATIENT
Start: 2019-12-30 | End: 2019-12-31

## 2019-12-30 RX ORDER — DIPHENHYDRAMINE HCL 25 MG
25 CAPSULE ORAL ONCE
Status: DISCONTINUED | OUTPATIENT
Start: 2019-12-30 | End: 2019-12-30 | Stop reason: HOSPADM

## 2019-12-30 RX ORDER — FENTANYL CITRATE 50 UG/ML
INJECTION, SOLUTION INTRAMUSCULAR; INTRAVENOUS
Status: DISCONTINUED | OUTPATIENT
Start: 2019-12-30 | End: 2019-12-30 | Stop reason: HOSPADM

## 2019-12-30 RX ADMIN — POLYETHYLENE GLYCOL 3350 17 G: 17 POWDER, FOR SOLUTION ORAL at 09:12

## 2019-12-30 RX ADMIN — MAGNESIUM OXIDE 800 MG: 400 TABLET ORAL at 06:12

## 2019-12-30 RX ADMIN — INSULIN ASPART 2 UNITS: 100 INJECTION, SOLUTION INTRAVENOUS; SUBCUTANEOUS at 09:12

## 2019-12-30 RX ADMIN — HYDRALAZINE HYDROCHLORIDE: 20 INJECTION INTRAMUSCULAR; INTRAVENOUS at 09:12

## 2019-12-30 RX ADMIN — FUROSEMIDE 40 MG: 10 INJECTION, SOLUTION INTRAMUSCULAR; INTRAVENOUS at 07:12

## 2019-12-30 RX ADMIN — INSULIN DETEMIR 10 UNITS: 100 INJECTION, SOLUTION SUBCUTANEOUS at 09:12

## 2019-12-30 RX ADMIN — ENOXAPARIN SODIUM 120 MG: 100 INJECTION SUBCUTANEOUS at 05:12

## 2019-12-30 RX ADMIN — PANTOPRAZOLE SODIUM 40 MG: 40 TABLET, DELAYED RELEASE ORAL at 07:12

## 2019-12-30 RX ADMIN — CHLORHEXIDINE GLUCONATE 15 ML: 1.2 RINSE ORAL at 09:12

## 2019-12-30 RX ADMIN — LISINOPRIL 5 MG: 5 TABLET ORAL at 12:12

## 2019-12-30 RX ADMIN — DILTIAZEM HYDROCHLORIDE 5 MG/HR: 5 INJECTION INTRAVENOUS at 01:12

## 2019-12-30 RX ADMIN — MUPIROCIN: 20 OINTMENT TOPICAL at 07:12

## 2019-12-30 RX ADMIN — INSULIN ASPART 2 UNITS: 100 INJECTION, SOLUTION INTRAVENOUS; SUBCUTANEOUS at 07:12

## 2019-12-30 RX ADMIN — CHLORHEXIDINE GLUCONATE 15 ML: 1.2 RINSE ORAL at 07:12

## 2019-12-30 RX ADMIN — MUPIROCIN: 20 OINTMENT TOPICAL at 09:12

## 2019-12-30 RX ADMIN — SODIUM CHLORIDE: 0.9 INJECTION, SOLUTION INTRAVENOUS at 11:12

## 2019-12-30 RX ADMIN — INSULIN ASPART 2 UNITS: 100 INJECTION, SOLUTION INTRAVENOUS; SUBCUTANEOUS at 04:12

## 2019-12-30 RX ADMIN — DIGOXIN 250 MCG: 0.25 INJECTION INTRAMUSCULAR; INTRAVENOUS at 07:12

## 2019-12-30 RX ADMIN — METOPROLOL SUCCINATE 25 MG: 25 TABLET, FILM COATED, EXTENDED RELEASE ORAL at 02:12

## 2019-12-30 RX ADMIN — HYDRALAZINE HYDROCHLORIDE 5 MG: 20 INJECTION INTRAMUSCULAR; INTRAVENOUS at 04:12

## 2019-12-30 NOTE — PROGRESS NOTES
[facility] Medicine  Progress Note    Patient Name: Jarret Jessica  MRN: [id number]  Admission Date: 12/29/2019  Attending Physician: Zuhair Ames MD   DOS: 12/30/2019    Subjective:     Principal Problem:Atrial fibrillation with RVR    Chief Complaint:   Chief Complaint   Patient presents with    Chest Pain     x several days    Shortness of Breath        HPI: 55 yo M with PMH of HTN and non-compliance with medical therapy presented with CP.  Transferred from [place] for new a-fib with RVR  Pt has been experiencing SOB for approx a week  At first it was SOB with productive cough, rhinorrhea, muscle aches, nausea and vomiting  He took OTC flu medication  However those symptoms have resolved; only the SOB remains  Last night before going to ED, he began having L-sided CP with radiation up to his neck  Described as pressure, constant, no aggravating or alleviating factors  Smokes 1 PPD  Reports palpitations  No HA, dizziness, no new n/v  Is unaware if he has been experiencing edema  No known hx CAD, HF, a-fib, but pt doesn't follow with physicians regularly   At [place], pt was transferred after receiving lovenox; pt sent with nitro drip and a-fib was uncontrolled on arrival  Pt started on diltiazem drip in our ED; nitro drip discontinued  Pt also found to have glucose >500 in OSH; insulin drip started there and continued here  Pt with elevated troponin noted; cardiology has been consulted    Hospital course:  The patient was admitted to the ICU overnight for workup and treatment including continuous Cardizem infusion, Lovenox, IV Lasix, continuous IV insulin infusion, and Cardiology evaluation.  The patient was taken for angiography this morning with preliminary report of negative/clean angiogram.    Interval history:  Today the patient reports feeling better with no further chest pain, resolved without recurrence.  He has persistent shortness of breath, constant timing, mild intensity,  worse with exertion, improving with treatment overnight.  No fever or chills.  No headache.  No nausea or vomiting    Physical exam:  Vital signs reviewed  General:  Comfortable appearing, no apparent distress, nontoxic  Head and eyes:  Anicteric sclerae, no conjunctival discharge, PERRLA  ENT:  Moist mucous membranes  Pulmonary:  Comfortable work of breathing  Cardiovascular:  2+ radial pulses, irregularly irregular rhythm, pedal edema  GI:  Abdomen is obese, soft and nontender  Skin:  Dry and warm no jaundice  Psych:  Mood is calm, affect normal, insight fair  Neuro:  Nonfocal motor exam, alert and oriented, fluent speech    Laboratory data:  Hemoglobin 12  Hematocrit 38  Glucose:  236-189    Echo impression:  · Moderate concentric left ventricular hypertrophy.  · Mild left ventricular enlargement.  · Severely decreased left ventricular systolic function. The estimated ejection fraction is 25%  · Severe global hypokinetic wall motion.  · Atrial fibrillation observed with restrictive filling pattern present.  · Low normal right ventricular systolic function.  · Mild left atrial enlargement.  · Intermediate central venous pressure (8 mm Hg).  · The estimated PA systolic pressure is 35 mm Hg  · The aortic root is mildly dilated.    Assessment/Plan:     Patient Active Problem List   Diagnosis    Essential hypertension, benign    Atrial fibrillation with RVR    Elevated troponin    Hyperglycemia    Newly diagnosed diabetes    Constipation    Acute congestive heart failure    BRI (obstructive sleep apnea)    NSTEMI (non-ST elevated myocardial infarction)    Tobacco abuse     Plan update today:  Continue care in ICU  Appreciate consultants.  Continue Cardizem continuous infusion and wean as able.  IV digoxin x1 today.  Oral beta-blocker started.  ACE-inhibitor started  Continue treatment dose Lovenox  Continue IV Lasix diuresis.  Monitor fluid status.  Fluid restriction.  Glucose stable.  Continue Levemir 10  units q.h.s. Continue glucose monitoring with sliding-scale insulin.  A1c 13.  Titrate regimen as needed  Serial labs  Mobilize once stabilized next 24 hr  GI prophylaxis with PPI  VTE prophylaxis with Lovenox    Assessment and Plan:  New A-fib with RVR  Complicated by new, acute CHF  Complicated by Elevated Troponin with suspected NSTEMI vs demand ischemia  - diltiazem gtt  - cardiology consulted, thank you  - trending troponin  - echo in progress  - lasix  - hydralazine prn  - lovenox started  - ICU monitoring     Diabetes with glucose >500  - insulin gtt  - trending glucose, BMP  - Electrolyte derangement:  Replacement prn  - A1c  - negative acetone    Suspected BRI  - CPAP QHS    Constipation  - miralax    Diet:  Diabetic, cardiac, 1.5L fluid restriction     VTE Risk Mitigation (From admission, onward)         Ordered     enoxaparin injection 120 mg  Every 12 hours (non-standard times)      12/30/19 1154              Zuhair Ames MD  Department of Hospital Medicine   [facility]

## 2019-12-30 NOTE — INTERVAL H&P NOTE
The patient has been examined and the H&P has been reviewed:    I concur with the findings and changes have been noted since the H&P was written: Please see my progress noted from today    Anesthesia/Surgery risks, benefits and alternative options discussed and understood by patient/family.          Active Hospital Problems    Diagnosis  POA    *Atrial fibrillation with RVR [I48.91]  Yes    Elevated troponin [R79.89]  Yes    Hyperglycemia [R73.9]  Yes    Newly diagnosed diabetes [E11.9]  Yes    Constipation [K59.00]  Yes    Acute congestive heart failure [I50.9]  Yes    BRI (obstructive sleep apnea) [G47.33]  Yes    NSTEMI (non-ST elevated myocardial infarction) [I21.4]  Yes    Tobacco abuse [Z72.0]  Yes    Essential hypertension, benign [I10]  Yes     Chronic      Resolved Hospital Problems   No resolved problems to display.

## 2019-12-30 NOTE — PLAN OF CARE
This note also relates to the following rows which could not be included:  SpO2 - Cannot attach notes to unvalidated device data  Pulse - Cannot attach notes to unvalidated device data  Resp - Cannot attach notes to unvalidated device data       12/30/19 0817   PRE-TX-O2   O2 Device (Oxygen Therapy) nasal cannula   $ Is the patient on Low Flow Oxygen? Yes   Flow (L/min) 3   Pulse Oximetry Type Continuous   $ Pulse Oximetry - Multiple Charge Pulse Oximetry - Multiple   Preset CPAP/BiPAP Settings   $ CPAP/BiPAP Daily Charge BiPAP/CPAP Daily  (cpap on sb)

## 2019-12-30 NOTE — PLAN OF CARE
Continue to educate patient on Plan of care, medications, procedures, and what to expect regarding procedure. Pt and family educated on low EF, Afib, Angiogram, and insulin. Pt and family verbalized understanding. Continue to promote PO intake. Monitor all vital signs and labwork. Post cardio monitoring. Pt tolerating well. Continue to monitor for pain (chest pain). Continue to treat with PRN meds. Goal 4> (1-10).

## 2019-12-30 NOTE — PROGRESS NOTES
[facility]  Cardiology  Progress Note    Patient Name: Jarret Jessica  MRN: [id number]  Admission Date: 12/29/2019  Hospital Length of Stay: 1 days  Code Status: Full Code   Attending Physician: Kiara Nunez MD   Primary Care Physician: Primary Doctor No  Expected Discharge Date:   Principal Problem:Atrial fibrillation with RVR    Subjective:       Interval History: Denies any chest pain. Reports shortness of breath is better.     ROS   Denies any bleeding issues.   Denies any abdominal pain.   Denies any dysuria.   Objective:     Vital Signs (Most Recent):  Temp: 98 °F (36.7 °C) (12/30/19 0300)  Pulse: 82 (12/30/19 0400)  Resp: (!) 22 (12/30/19 0400)  BP: (!) 171/86 (12/30/19 0400)  SpO2: (!) 94 % (12/30/19 0400) Vital Signs (24h Range):  Temp:  [98 °F (36.7 °C)] 98 °F (36.7 °C)  Pulse:  [] 82  Resp:  [16-42] 22  SpO2:  [88 %-100 %] 94 %  BP: (131-171)/() 171/86     Weight: 119 kg (262 lb 5.6 oz)  Body mass index is 36.59 kg/m².    SpO2: (!) 94 %  O2 Device (Oxygen Therapy): nasal cannula      Intake/Output Summary (Last 24 hours) at 12/30/2019 0713  Last data filed at 12/30/2019 0301  Gross per 24 hour   Intake 3.27 ml   Output 1300 ml   Net -1296.73 ml       Lines/Drains/Airways     Peripheral Intravenous Line                 Peripheral IV - Single Lumen 12/29/19 0217 18 G Left Antecubital 1 day         Peripheral IV - Single Lumen 12/29/19 0320 18 G Right Forearm 1 day                Scheduled Meds:   chlorhexidine  15 mL Mouth/Throat BID    enoxparin  1 mg/kg Subcutaneous Q12H    furosemide (LASIX) IV  40 mg Intravenous Daily    insulin detemir U-100  10 Units Subcutaneous QHS    metoprolol succinate  25 mg Oral Daily    mupirocin   Nasal BID    pantoprazole  40 mg Oral Daily    polyethylene glycol  17 g Oral BID     Continuous Infusions:   dilTIAZem 5 mg/hr (12/30/19 0117)     PRN Meds:.acetaminophen, calcium gluconate IVPB, calcium gluconate IVPB, calcium gluconate IVPB,  dextrose 50%, dextrose 50%, glucagon (human recombinant), glucose, glucose, hydrALAZINE, hydrALAZINE, insulin aspart U-100, magnesium oxide, magnesium oxide, magnesium sulfate IVPB, magnesium sulfate IVPB, morphine, ondansetron, potassium chloride in water **AND** potassium chloride in water **AND** potassium chloride in water, potassium chloride 10%, potassium chloride 10%, potassium chloride 10%, potassium, sodium phosphates, potassium, sodium phosphates, potassium, sodium phosphates, sodium chloride 0.9%, sodium phosphate IVPB, sodium phosphate IVPB, sodium phosphate IVPB     Physical Exam  HEENT: Normocephalic, atraumatic, PERRL, Conjunctiva pink, no scleral icterus.   CVS: S1S2+, Irregular no murmurs, rubs or gallops, JVP: Normal.  LUNGS: Clear  ABDOMEN: Soft, NT, BS+  EXTREMITIES: No cyanosis, clubbing. +trace edema  NEURO: AAO X 3.       Significant Labs:   BMP:   Recent Labs   Lab 12/29/19 0225 12/29/19  0739 12/29/19  1233 12/29/19  1835 12/30/19  0413   * 259*  --   --  209*   * 137  --   --  138   K 4.3 4.0 3.3* 3.6 4.0   CL 97 101  --   --  101   CO2 26 27  --   --  27   BUN 22* 21*  --   --  16   CREATININE 1.4 1.4  --   --  1.2   CALCIUM 8.8 8.6*  --   --  8.6*   MG  --   --  1.8 1.7 1.9   , CMP   Recent Labs   Lab 12/29/19 0225 12/29/19  0739 12/29/19  1233 12/29/19  1835 12/30/19  0413   * 137  --   --  138   K 4.3 4.0 3.3* 3.6 4.0   CL 97 101  --   --  101   CO2 26 27  --   --  27   * 259*  --   --  209*   BUN 22* 21*  --   --  16   CREATININE 1.4 1.4  --   --  1.2   CALCIUM 8.8 8.6*  --   --  8.6*   PROT 6.7 7.0  --   --   --    ALBUMIN 3.5 3.5  --   --   --    BILITOT 0.6 0.8  --   --   --    ALKPHOS 72 66  --   --   --    AST 25 22  --   --   --    ALT 32 31  --   --   --    ANIONGAP 9 9  --   --  10   ESTGFRAFRICA >60.0 >60.0  --   --  >60.0   EGFRNONAA 55.8* 55.8*  --   --  >60.0   , CBC   Recent Labs   Lab 12/29/19  0225 12/29/19  0739 12/30/19  0413   WBC 7.44 7.54  6.58   HGB 12.4* 12.4* 12.4*   HCT 39.2* 38.4* 38.8*    168 168   , INR   Recent Labs   Lab 12/29/19  0739   INR 1.2   , Lipid Panel   Recent Labs   Lab 12/30/19  0413   CHOL 158   HDL 30*   LDLCALC 108.4   TRIG 98   CHOLHDL 19.0*    and Troponin   Recent Labs   Lab 12/29/19  0739 12/29/19  1233 12/30/19  0413   TROPONINI 0.392* 0.373* 0.345*       Significant Imaging: Reviewed  Assessment and Plan:     IMPRESSION:  CHF. Reduced LVEF. Volume status better and patient able to lay flat  Cardiomyopathy(LVEF 25%).  Ischemic versus nonischemic.  Elevated troponin.  Etiology?  Non-STEMI with subendocardial ischemia versus secondary to congestive heart failure.  Diabetes mellitus.  Poorly controlled.  Better now.  Hypertension.  Dyslipidemia with low HDL.  Tobacco abuse.  Marijuana abuse.    PLAN:  1.  Since the patient is able to lay flat and volume status has improved, I advised the patient to have a left heart catheterization done in view of his elevated troponin and cardiomyopathy for further etiology determination.  After discussion of the indications, risks, benefits as well as alternatives to the procedure with the patient as well as his sister (who reportedly is a cath lab nurse) in layman terms, he decided to proceed with left heart catheterization.  Informed consent was obtained.  2.  In view of the cardiomyopathy will discontinue Cardizem.  Will use beta-blocker as well as digoxin for rate control.  3.  Will add ACE-inhibitor to the current regimen.  4.  Will decide about further management of atrial fibrillation based upon the outcome of the left heart catheterization.      Gasper Person MD  Cardiology  [facility]

## 2019-12-30 NOTE — PLAN OF CARE
12/30/19 1555   Discharge Assessment   Assessment Type Discharge Planning Assessment     Consulted for glucometer for pt, contacted Dr Sims for asistance and can get a VeriFlex monitor free but need to get the strips and lancets elsewhere. Called the [facility] downstairs in [facility], spoke with Aneesh and she costed out the VeriFlex alncets and Strips for about $42.00 but she informed this CM that they have a True Metrics type Glucometer and the lancets, Strips and the meter would be $20.00 for all.      Went to pt and spoke with pt and family and they decided on the True  Metric Glucometer, updated Jennifer WOODWARD and Dr Ames. The pharmacy downstairs will even deliver to pt after receiving Rx fax or electronically.  CM will follow for DC Planning needs.

## 2019-12-31 LAB
ANION GAP SERPL CALC-SCNC: 10 MMOL/L (ref 8–16)
APTT PPP: 38 SEC (ref 23.6–33.3)
BACTERIA SPEC AEROBE CULT: NORMAL
BASOPHILS # BLD AUTO: 0.02 K/UL (ref 0–0.2)
BASOPHILS NFR BLD: 0.3 % (ref 0–1.9)
BUN SERPL-MCNC: 16 MG/DL (ref 6–20)
CALCIUM SERPL-MCNC: 8.4 MG/DL (ref 8.7–10.5)
CHLORIDE SERPL-SCNC: 100 MMOL/L (ref 95–110)
CO2 SERPL-SCNC: 27 MMOL/L (ref 23–29)
CREAT SERPL-MCNC: 1.3 MG/DL (ref 0.5–1.4)
DIFFERENTIAL METHOD: ABNORMAL
EOSINOPHIL # BLD AUTO: 0.1 K/UL (ref 0–0.5)
EOSINOPHIL NFR BLD: 0.9 % (ref 0–8)
ERYTHROCYTE [DISTWIDTH] IN BLOOD BY AUTOMATED COUNT: 14 % (ref 11.5–14.5)
EST. GFR  (AFRICAN AMERICAN): >60 ML/MIN/1.73 M^2
EST. GFR  (NON AFRICAN AMERICAN): >60 ML/MIN/1.73 M^2
GLUCOSE SERPL-MCNC: 176 MG/DL (ref 70–110)
GLUCOSE SERPL-MCNC: 182 MG/DL (ref 70–110)
GLUCOSE SERPL-MCNC: 199 MG/DL (ref 70–110)
GLUCOSE SERPL-MCNC: 248 MG/DL (ref 70–110)
GLUCOSE SERPL-MCNC: 284 MG/DL (ref 70–110)
GRAM STN SPEC: NORMAL
HCT VFR BLD AUTO: 39.5 % (ref 40–54)
HGB BLD-MCNC: 12.7 G/DL (ref 14–18)
IMM GRANULOCYTES # BLD AUTO: 0.02 K/UL (ref 0–0.04)
IMM GRANULOCYTES NFR BLD AUTO: 0.3 % (ref 0–0.5)
INR PPP: 1.1
LYMPHOCYTES # BLD AUTO: 0.9 K/UL (ref 1–4.8)
LYMPHOCYTES NFR BLD: 15.7 % (ref 18–48)
MAGNESIUM SERPL-MCNC: 1.9 MG/DL (ref 1.6–2.6)
MCH RBC QN AUTO: 26.2 PG (ref 27–31)
MCHC RBC AUTO-ENTMCNC: 32.2 G/DL (ref 32–36)
MCV RBC AUTO: 82 FL (ref 82–98)
MONOCYTES # BLD AUTO: 0.5 K/UL (ref 0.3–1)
MONOCYTES NFR BLD: 9.1 % (ref 4–15)
NEUTROPHILS # BLD AUTO: 4.2 K/UL (ref 1.8–7.7)
NEUTROPHILS NFR BLD: 73.7 % (ref 38–73)
NRBC BLD-RTO: 0 /100 WBC
PHOSPHATE SERPL-MCNC: 3.2 MG/DL (ref 2.7–4.5)
PLATELET # BLD AUTO: 157 K/UL (ref 150–350)
PMV BLD AUTO: ABNORMAL FL (ref 9.2–12.9)
POTASSIUM SERPL-SCNC: 3.5 MMOL/L (ref 3.5–5.1)
PROTHROMBIN TIME: 14.1 SEC (ref 10.6–14.8)
RBC # BLD AUTO: 4.84 M/UL (ref 4.6–6.2)
SODIUM SERPL-SCNC: 137 MMOL/L (ref 136–145)
WBC # BLD AUTO: 5.73 K/UL (ref 3.9–12.7)

## 2019-12-31 PROCEDURE — 27000221 HC OXYGEN, UP TO 24 HOURS

## 2019-12-31 PROCEDURE — 94761 N-INVAS EAR/PLS OXIMETRY MLT: CPT

## 2019-12-31 PROCEDURE — 99900035 HC TECH TIME PER 15 MIN (STAT)

## 2019-12-31 PROCEDURE — 63600175 PHARM REV CODE 636 W HCPCS: Performed by: INTERNAL MEDICINE

## 2019-12-31 PROCEDURE — 36415 COLL VENOUS BLD VENIPUNCTURE: CPT

## 2019-12-31 PROCEDURE — 94660 CPAP INITIATION&MGMT: CPT

## 2019-12-31 PROCEDURE — 21400001 HC TELEMETRY ROOM

## 2019-12-31 PROCEDURE — 20000000 HC ICU ROOM

## 2019-12-31 PROCEDURE — 85025 COMPLETE CBC W/AUTO DIFF WBC: CPT

## 2019-12-31 PROCEDURE — C9399 UNCLASSIFIED DRUGS OR BIOLOG: HCPCS | Performed by: INTERNAL MEDICINE

## 2019-12-31 PROCEDURE — 25000003 PHARM REV CODE 250: Performed by: INTERNAL MEDICINE

## 2019-12-31 PROCEDURE — 84100 ASSAY OF PHOSPHORUS: CPT

## 2019-12-31 PROCEDURE — 83735 ASSAY OF MAGNESIUM: CPT

## 2019-12-31 PROCEDURE — 80048 BASIC METABOLIC PNL TOTAL CA: CPT

## 2019-12-31 PROCEDURE — 85610 PROTHROMBIN TIME: CPT

## 2019-12-31 PROCEDURE — 85730 THROMBOPLASTIN TIME PARTIAL: CPT

## 2019-12-31 PROCEDURE — 82962 GLUCOSE BLOOD TEST: CPT

## 2019-12-31 RX ORDER — DIGOXIN 0.25 MG/ML
250 INJECTION INTRAMUSCULAR; INTRAVENOUS ONCE
Status: DISCONTINUED | OUTPATIENT
Start: 2019-12-31 | End: 2019-12-31

## 2019-12-31 RX ORDER — CLONIDINE HYDROCHLORIDE 0.1 MG/1
0.1 TABLET ORAL EVERY 6 HOURS PRN
Status: DISCONTINUED | OUTPATIENT
Start: 2019-12-31 | End: 2020-01-03 | Stop reason: HOSPADM

## 2019-12-31 RX ORDER — DIGOXIN 0.25 MG/ML
250 INJECTION INTRAMUSCULAR; INTRAVENOUS ONCE
Status: COMPLETED | OUTPATIENT
Start: 2019-12-31 | End: 2019-12-31

## 2019-12-31 RX ORDER — LORAZEPAM 2 MG/ML
1 INJECTION INTRAMUSCULAR EVERY 4 HOURS PRN
Status: DISCONTINUED | OUTPATIENT
Start: 2019-12-31 | End: 2020-01-03 | Stop reason: HOSPADM

## 2019-12-31 RX ORDER — METOPROLOL TARTRATE 25 MG/1
25 TABLET, FILM COATED ORAL 4 TIMES DAILY
Status: DISCONTINUED | OUTPATIENT
Start: 2019-12-31 | End: 2020-01-01

## 2019-12-31 RX ADMIN — POLYETHYLENE GLYCOL 3350 17 G: 17 POWDER, FOR SOLUTION ORAL at 08:12

## 2019-12-31 RX ADMIN — INSULIN ASPART 1 UNITS: 100 INJECTION, SOLUTION INTRAVENOUS; SUBCUTANEOUS at 08:12

## 2019-12-31 RX ADMIN — MUPIROCIN: 20 OINTMENT TOPICAL at 08:12

## 2019-12-31 RX ADMIN — CHLORHEXIDINE GLUCONATE 15 ML: 1.2 RINSE ORAL at 08:12

## 2019-12-31 RX ADMIN — ENOXAPARIN SODIUM 120 MG: 100 INJECTION SUBCUTANEOUS at 05:12

## 2019-12-31 RX ADMIN — FUROSEMIDE 40 MG: 10 INJECTION, SOLUTION INTRAMUSCULAR; INTRAVENOUS at 08:12

## 2019-12-31 RX ADMIN — APIXABAN 5 MG: 5 TABLET, FILM COATED ORAL at 08:12

## 2019-12-31 RX ADMIN — POTASSIUM CHLORIDE 40 MEQ: 20 SOLUTION ORAL at 08:12

## 2019-12-31 RX ADMIN — METOPROLOL SUCCINATE 25 MG: 25 TABLET, FILM COATED, EXTENDED RELEASE ORAL at 08:12

## 2019-12-31 RX ADMIN — LORAZEPAM 1 MG: 2 INJECTION INTRAMUSCULAR; INTRAVENOUS at 12:12

## 2019-12-31 RX ADMIN — DIGOXIN 250 MCG: 0.25 INJECTION INTRAMUSCULAR; INTRAVENOUS at 05:12

## 2019-12-31 RX ADMIN — PANTOPRAZOLE SODIUM 40 MG: 40 TABLET, DELAYED RELEASE ORAL at 08:12

## 2019-12-31 RX ADMIN — LISINOPRIL 5 MG: 5 TABLET ORAL at 08:12

## 2019-12-31 RX ADMIN — HYDRALAZINE HYDROCHLORIDE 10 MG: 20 INJECTION INTRAMUSCULAR; INTRAVENOUS at 01:12

## 2019-12-31 RX ADMIN — METOPROLOL TARTRATE 25 MG: 25 TABLET ORAL at 05:12

## 2019-12-31 RX ADMIN — INSULIN ASPART 2 UNITS: 100 INJECTION, SOLUTION INTRAVENOUS; SUBCUTANEOUS at 04:12

## 2019-12-31 RX ADMIN — INSULIN DETEMIR 10 UNITS: 100 INJECTION, SOLUTION SUBCUTANEOUS at 08:12

## 2019-12-31 NOTE — PROGRESS NOTES
Nursing report patient's heart rate increased with minimal exertion.  Was given IV hydralazine today to control blood pressure of systolic above 180.  Patient reports breathing better since admission to hospital.  On examination  Heart rate is 120, blood pressure is 120/80 there is a soft S3 gallop lungs are clear to auscultation.  Assessment acute systolic CHF due to nonischemic cardiomyopathy most likely untreated hypertension.  Recommendations  Increase metoprolol to 25 mg q.6 hourly, once we know what is the daily requirement can be changed over to succinate.  Add digoxin for rate control and also has a positive inotrope.  I would treat the high blood pressures with p.r.n. clonidine as IV hydralazine increases heart rate significantly.  I would recommend cardioversion only after his heart failure is optimized and heart rate control is better.  He is going to need chronic anticoagulation I will discontinue the Lovenox and start him on Eliquis 5 mg twice daily

## 2019-12-31 NOTE — PROGRESS NOTES
[facility] Medicine  Progress Note    Patient Name: Jarret Jessica  MRN: [id number]  Patient Class: IP- Inpatient   Admission Date: 12/29/2019  Length of Stay: 2 days  Attending Physician: Deep Moran MD  Primary Care Provider: Primary Doctor No        Subjective:     Principal Problem:Atrial fibrillation with RVR    Interval History:     Seen and examined in the morning  Still in AFib and a rate around 120s  Lungs are clear now   Review of Systems  Objective:     Vital Signs (Most Recent):  Temp: 98.7 °F (37.1 °C) (12/31/19 1501)  Pulse: 85 (12/31/19 1501)  Resp: 13 (12/31/19 1501)  BP: 116/60 (12/31/19 1501)  SpO2: 95 % (12/31/19 1501) Vital Signs (24h Range):  Temp:  [97.9 °F (36.6 °C)-98.9 °F (37.2 °C)] 98.7 °F (37.1 °C)  Pulse:  [] 85  Resp:  [13-44] 13  SpO2:  [86 %-96 %] 95 %  BP: (111-187)/() 116/60     Weight: 117.5 kg (259 lb 0.7 oz)  Body mass index is 36.13 kg/m².    Intake/Output Summary (Last 24 hours) at 12/31/2019 1703  Last data filed at 12/31/2019 1100  Gross per 24 hour   Intake 360 ml   Output 650 ml   Net -290 ml      Physical Exam  Physical Exam:  General- Patient alert and oriented x3 in NAD  HEENT- PERRLA, EOMI, OP clear, MMM  Neck- No JVD, Lymphadenopathy, Thyromegaly  CV- Regular rate and rhythm, No Murmur/tobi/rubs  Resp-clear lungs   GI- Non tender/non-distended, BS normoactive x4 quads,  Extrem- No cyanosis, clubbing, edema. Pulses 2+ and symmetric  Neuro- awake and alert , no weakness  Skin-  No masses, rashes or lesions noted on cursory skin exam.    Overview/Hospital Course:     Significant Labs:   CBC:   Recent Labs   Lab 12/30/19  0413 12/31/19  0336   WBC 6.58 5.73   HGB 12.4* 12.7*   HCT 38.8* 39.5*    157     CMP:   Recent Labs   Lab 12/29/19  1835 12/30/19  0413 12/31/19  0336   NA  --  138 137   K 3.6 4.0 3.5   CL  --  101 100   CO2  --  27 27   GLU  --  209* 199*   BUN  --  16 16   CREATININE  --  1.2 1.3   CALCIUM  --  8.6*  8.4*   ANIONGAP  --  10 10   EGFRNONAA  --  >60.0 >60.0       Significant Imaging: I have reviewed all pertinent imaging results/findings within the past 24 hours.    Assessment/Plan:    Assessment     New A-fib with RVR :rate uncontrolled   Complicated by new, acute CHF s/p ECHO with EF 23% and neg angiogram most likely from tachycardia induced cardiomyopathy  Positive troponins secondary  demand ischemia s/p LHC negative    Diabetes with glucose >500 at admission    Suspected BRI    Plan   Eliquis, digoxin , IV lasix , insulin , lopressor   Possible cardioversion tomorrow if does not convert  NPO overnight   Downgrade .  Follow cardiology          Active Diagnoses:    Diagnosis Date Noted POA    PRINCIPAL PROBLEM:  Atrial fibrillation with RVR [I48.91] 12/29/2019 Yes    Elevated troponin [R79.89] 12/29/2019 Yes    Hyperglycemia [R73.9] 12/29/2019 Yes    Newly diagnosed diabetes [E11.9] 12/29/2019 Yes    Constipation [K59.00] 12/29/2019 Yes    Acute congestive heart failure [I50.9] 12/29/2019 Yes    BRI (obstructive sleep apnea) [G47.33] 12/29/2019 Yes    NSTEMI (non-ST elevated myocardial infarction) [I21.4] 12/29/2019 Yes    Tobacco abuse [Z72.0] 12/29/2019 Yes    Essential hypertension, benign [I10] 09/18/2012 Yes     Chronic      Problems Resolved During this Admission:     VTE Risk Mitigation (From admission, onward)         Ordered     apixaban tablet 5 mg  2 times daily      12/31/19 1650                   Deep Moran MD  Department of Hospital Medicine   [facility]

## 2019-12-31 NOTE — PLAN OF CARE
12/31/19 1115   Discharge Assessment   Assessment Type Discharge Planning Assessment     Patient nurse Mrs Fraser called this  that patient and his sister asking about financial assistance to help pay the bill, called Mrs Rios with [employer] at [phone] for Medicaid Application and she said patient does not qualify for Medicaid, but she said Business [employer] nay be able to help. Called [facility] [employer] at [phone] and spoke with Mrs Encinas and she can mail out a application for assistance, gave pt address info and she faxing to this CM the application and will bring to pt room once rec'd.  CM will follow for dc planning needs.

## 2019-12-31 NOTE — BRIEF OP NOTE
S/p LHC with normal coronaries.   Patient checked again 1400. Right wrist with no hematoma or bleeding and good pulse.

## 2019-12-31 NOTE — PLAN OF CARE
This note also relates to the following rows which could not be included:  SpO2 - Cannot attach notes to unvalidated device data  Pulse - Cannot attach notes to unvalidated device data  Resp - Cannot attach notes to unvalidated device data       12/31/19 0944   PRE-TX-O2   O2 Device (Oxygen Therapy) room air  (nc on sb)   $ Is the patient on Low Flow Oxygen? Yes   Pulse Oximetry Type Continuous   $ Pulse Oximetry - Multiple Charge Pulse Oximetry - Multiple   Preset CPAP/BiPAP Settings   $ CPAP/BiPAP Daily Charge BiPAP/CPAP Daily  (cpap on sb)

## 2019-12-31 NOTE — PLAN OF CARE
Plan of care discussed with patient and wife of possible  Cardioversion in am.  Discussed cardioversion with patient. Continue to reinforce as needed.

## 2019-12-31 NOTE — PROGRESS NOTES
"[facility]  Adult Nutrition   Progress Note (Initial Assessment)     SUMMARY     Recommendations  Recommendation/Intervention:   1. RD educated patient and wife on diabetic and cardiac diet.   · RD educated patient and wife on diabetic diet and cardiac diet. Provided diabetic lifestyle skills, blood glucose goals, meal planning and carb counting list, label reading tips, and heart healthy guidelines. Patient and wife expressed understanding of diet recommendations.   2. Encourage continued adequate intake. \    Goals:   1. Patient to express understanding of diet recommendations and RD to answer diet realted questions should they arise.  2. Patient to meet at least 75% of needs PO.     Nutrition Goal Status: new  Communication of RD Recs: reviewed with RN    Reason for Assessment    Reason For Assessment: other (see comments)(ICU status )  Diagnosis: cardiac disease, diabetes diagnosis/complications  Interdisciplinary Rounds: attended  Nutrition Discharge Planning: diabetic, cardiac diet     Nutrition Risk Screen    Nutrition Risk Screen: no indicators present       Nutrition/Diet History    Patient Reported Diet/Restrictions/Preferences: general  Spiritual, Cultural Beliefs, Sabianist Practices, Values that Affect Care: no  Food Allergies: NKFA  Factors Affecting Nutritional Intake: None identified at this time    Anthropometrics    Temp: 98.7 °F (37.1 °C)  Height Method: Stated  Height: 5' 11" (180.3 cm)  Height (inches): 71 in  Weight Method: Bed Scale  Weight: 117.5 kg (259 lb 0.7 oz)  Weight (lb): 259.04 lb  Ideal Body Weight (IBW), Male: 172 lb  % Ideal Body Weight, Male (lb): 152.53 %  BMI (Calculated): 36.1  BMI Grade: 35 - 39.9 - obesity - grade II       Weight History:  Wt Readings from Last 10 Encounters:   12/31/19 117.5 kg (259 lb 0.7 oz)   12/29/19 121.1 kg (267 lb)   09/21/12 110.5 kg (243 lb 9.6 oz)   09/18/12 110.7 kg (244 lb)       Lab/Procedures/Meds: Pertinent Labs " Reviewed  Clinical Chemistry:  Recent Labs   Lab 12/29/19 0739 12/29/19  1233  12/30/19  0413 12/31/19 0336     --   --  138 137   K 4.0 3.3*   < > 4.0 3.5     --   --  101 100   CO2 27  --   --  27 27   *  --   --  209* 199*   BUN 21*  --   --  16 16   CREATININE 1.4  --   --  1.2 1.3   CALCIUM 8.6*  --   --  8.6* 8.4*   PROT 7.0  --   --   --   --    ALBUMIN 3.5  --   --   --   --    BILITOT 0.8  --   --   --   --    ALKPHOS 66  --   --   --   --    AST 22  --   --   --   --    ALT 31  --   --   --   --    ANIONGAP 9  --   --  10 10   ESTGFRAFRICA >60.0  --   --  >60.0 >60.0   EGFRNONAA 55.8*  --   --  >60.0 >60.0   MG  --  1.8   < > 1.9 1.9   PHOS  --  3.6  --  3.4 3.2    < > = values in this interval not displayed.     CBC:   Recent Labs   Lab 12/31/19 0336   WBC 5.73   RBC 4.84   HGB 12.7*   HCT 39.5*      MCV 82   MCH 26.2*   MCHC 32.2     Lipid Panel:  Recent Labs   Lab 12/30/19  0413   CHOL 158   HDL 30*   LDLCALC 108.4   TRIG 98   CHOLHDL 19.0*     Cardiac Profile:  Recent Labs   Lab 12/29/19 0225 12/29/19 0739 12/29/19  1233 12/30/19  0413   BNP 1,032*  --   --   --   --    TROPONINI 0.43   < > 0.392* 0.373* 0.345*    < > = values in this interval not displayed.     Inflammatory Labs:  No results for input(s): CRP in the last 168 hours.  Diabetes:  Recent Labs   Lab 12/29/19 0328 12/29/19 0448 12/29/19 1233   HGBA1C  --   --  13.3*  13.3*   POCTGLUCOSE 455* 184*  --      Thyroid & Parathyroid:  Recent Labs   Lab 12/29/19  0739   TSH 3.440     Medications: Pertinent Medications reviewed  Scheduled Meds:   chlorhexidine  15 mL Mouth/Throat BID    enoxparin  1 mg/kg Subcutaneous Q12H    furosemide (LASIX) IV  40 mg Intravenous Daily    insulin detemir U-100  10 Units Subcutaneous QHS    lisinopril  5 mg Oral Daily    metoprolol succinate  25 mg Oral Daily    mupirocin   Nasal BID    pantoprazole  40 mg Oral Daily    polyethylene glycol  17 g Oral BID     Continuous  Infusions:   dilTIAZem Stopped (12/30/19 0800)     PRN Meds:.acetaminophen, calcium gluconate IVPB, calcium gluconate IVPB, calcium gluconate IVPB, dextrose 50%, dextrose 50%, glucagon (human recombinant), glucose, glucose, hydrALAZINE, hydrALAZINE, HYDROcodone-acetaminophen, insulin aspart U-100, lorazepam, magnesium oxide, magnesium oxide, magnesium sulfate IVPB, magnesium sulfate IVPB, morphine, nitroGLYCERIN, ondansetron, potassium chloride in water **AND** potassium chloride in water **AND** potassium chloride in water, potassium chloride 10%, potassium chloride 10%, potassium chloride 10%, potassium, sodium phosphates, potassium, sodium phosphates, potassium, sodium phosphates, sodium chloride 0.9%, sodium phosphate IVPB, sodium phosphate IVPB, sodium phosphate IVPB    Estimated/Assessed Needs    Weight Used For Calorie Calculations: 117.5 kg (259 lb 0.7 oz)  Energy Calorie Requirements (kcal): 2350 (20)   Energy Need Method: Kcal/kg  Protein Requirements: 113 - 150 (1.5 - 2 g/kg IBW)   Weight Used For Protein Calculations: 75 kg (165 lb 5.5 oz)(IBW)     Estimated Fluid Requirement Method: RDA Method  RDA Method (mL): 2350       Nutrition Prescription Ordered    Current Diet Order: 1800 daibetic; cardiac     Evaluation of Received Nutrient/Fluid Intake    Energy Calories Required: meeting needs  Protein Required: meeting needs  Fluid Required: meeting needs  Tolerance: tolerating     Intake/Output Summary (Last 24 hours) at 12/31/2019 1558  Last data filed at 12/31/2019 1100  Gross per 24 hour   Intake 360 ml   Output 650 ml   Net -290 ml      % Intake of Estimated Energy Needs: 75 - 100 %  % Meal Intake: 75 - 100 %    Dietitian Rounds Brief  Patient assessed 2' ICU status. PO intake good. Uncontrolled DM. Pt does not have glucometer but plans to obtain one. RD educated pt on diabetic diet and cardiac diet. Encouraged outpatient DM counseling with RD.     Nutrition Risk    Level of Risk/Frequency of  Follow-up: moderate     Monitor and Evaluation    Food and Nutrient Intake: energy intake  Food and Nutrient Adminstration: diet order  Knowledge/Beliefs/Attitudes: food and nutrition knowledge/skill, beliefs and attitudes  Physical Activity and Function: nutrition-related ADLs and IADLs  Anthropometric Measurements: weight, weight change  Biochemical Data, Medical Tests and Procedures: gastrointestinal profile, inflammatory profile, electrolyte and renal panel, glucose/endocrine profile, lipid profile  Nutrition-Focused Physical Findings: overall appearance     Nutrition Follow-Up    RD Follow-up?: Yes     Leesa Moya RD 12/31/2019 4:00 PM

## 2020-01-01 ENCOUNTER — TELEPHONE (OUTPATIENT)
Dept: CARDIOLOGY | Facility: CLINIC | Age: 55
End: 2020-01-01

## 2020-01-01 ENCOUNTER — HOSPITAL ENCOUNTER (INPATIENT)
Facility: HOSPITAL | Age: 55
LOS: 2 days | Discharge: LEFT BEFORE DISCHARGE | DRG: 291 | End: 2020-12-21
Attending: EMERGENCY MEDICINE | Admitting: INTERNAL MEDICINE

## 2020-01-01 VITALS
DIASTOLIC BLOOD PRESSURE: 75 MMHG | OXYGEN SATURATION: 97 % | TEMPERATURE: 98 F | SYSTOLIC BLOOD PRESSURE: 99 MMHG | BODY MASS INDEX: 36.39 KG/M2 | HEIGHT: 71 IN | RESPIRATION RATE: 18 BRPM | WEIGHT: 259.94 LBS | HEART RATE: 62 BPM

## 2020-01-01 DIAGNOSIS — R41.82 AMS (ALTERED MENTAL STATUS): ICD-10-CM

## 2020-01-01 DIAGNOSIS — I48.91 ATRIAL FIBRILLATION WITH RAPID VENTRICULAR RESPONSE: Primary | ICD-10-CM

## 2020-01-01 DIAGNOSIS — I63.9 EMBOLIC STROKE: ICD-10-CM

## 2020-01-01 DIAGNOSIS — I50.21 ACUTE SYSTOLIC CHF (CONGESTIVE HEART FAILURE): ICD-10-CM

## 2020-01-01 DIAGNOSIS — R06.02 SHORTNESS OF BREATH: ICD-10-CM

## 2020-01-01 DIAGNOSIS — R73.9 HYPERGLYCEMIA WITHOUT KETOSIS: ICD-10-CM

## 2020-01-01 DIAGNOSIS — Z91.199 NONCOMPLIANCE: Chronic | ICD-10-CM

## 2020-01-01 DIAGNOSIS — I50.9 ACUTE ON CHRONIC CONGESTIVE HEART FAILURE, UNSPECIFIED HEART FAILURE TYPE: ICD-10-CM

## 2020-01-01 LAB
ALBUMIN SERPL BCP-MCNC: 3.1 G/DL (ref 3.5–5.2)
ALLENS TEST: ABNORMAL
ALP SERPL-CCNC: 96 U/L (ref 55–135)
ALT SERPL W/O P-5'-P-CCNC: 42 U/L (ref 10–44)
AMPHET+METHAMPHET UR QL: NEGATIVE
ANION GAP SERPL CALC-SCNC: 7 MMOL/L (ref 8–16)
ANION GAP SERPL CALC-SCNC: 8 MMOL/L (ref 8–16)
APTT BLDCRRT: 28.4 SEC (ref 21–32)
AST SERPL-CCNC: 33 U/L (ref 10–40)
BACTERIA BLD CULT: NORMAL
BACTERIA BLD CULT: NORMAL
BARBITURATES UR QL SCN>200 NG/ML: NEGATIVE
BASOPHILS # BLD AUTO: 0.01 K/UL (ref 0–0.2)
BASOPHILS # BLD AUTO: 0.02 K/UL (ref 0–0.2)
BASOPHILS NFR BLD: 0.1 % (ref 0–1.9)
BASOPHILS NFR BLD: 0.4 % (ref 0–1.9)
BENZODIAZ UR QL SCN>200 NG/ML: NEGATIVE
BILIRUB SERPL-MCNC: 0.8 MG/DL (ref 0.1–1)
BNP SERPL-MCNC: 1267 PG/ML (ref 0–99)
BUN SERPL-MCNC: 20 MG/DL (ref 6–20)
BUN SERPL-MCNC: 20 MG/DL (ref 6–20)
BUN SERPL-MCNC: 22 MG/DL (ref 6–20)
BUN SERPL-MCNC: 23 MG/DL (ref 6–20)
BZE UR QL SCN: NEGATIVE
CALCIUM SERPL-MCNC: 8 MG/DL (ref 8.7–10.5)
CALCIUM SERPL-MCNC: 8.1 MG/DL (ref 8.7–10.5)
CALCIUM SERPL-MCNC: 8.7 MG/DL (ref 8.7–10.5)
CALCIUM SERPL-MCNC: 8.9 MG/DL (ref 8.7–10.5)
CANNABINOIDS UR QL SCN: NORMAL
CHLORIDE SERPL-SCNC: 100 MMOL/L (ref 95–110)
CHLORIDE SERPL-SCNC: 102 MMOL/L (ref 95–110)
CHLORIDE SERPL-SCNC: 96 MMOL/L (ref 95–110)
CHLORIDE SERPL-SCNC: 99 MMOL/L (ref 95–110)
CHOLEST SERPL-MCNC: 130 MG/DL (ref 120–199)
CHOLEST/HDLC SERPL: 4.6 {RATIO} (ref 2–5)
CO2 SERPL-SCNC: 24 MMOL/L (ref 23–29)
CO2 SERPL-SCNC: 27 MMOL/L (ref 23–29)
CO2 SERPL-SCNC: 28 MMOL/L (ref 23–29)
CO2 SERPL-SCNC: 28 MMOL/L (ref 23–29)
CREAT SERPL-MCNC: 1.2 MG/DL (ref 0.5–1.4)
CREAT SERPL-MCNC: 1.2 MG/DL (ref 0.5–1.4)
CREAT SERPL-MCNC: 1.3 MG/DL (ref 0.5–1.4)
CREAT SERPL-MCNC: 1.3 MG/DL (ref 0.5–1.4)
CREAT UR-MCNC: 46.5 MG/DL (ref 23–375)
DELSYS: ABNORMAL
DIFFERENTIAL METHOD: ABNORMAL
DIFFERENTIAL METHOD: ABNORMAL
EOSINOPHIL # BLD AUTO: 0 K/UL (ref 0–0.5)
EOSINOPHIL # BLD AUTO: 0.1 K/UL (ref 0–0.5)
EOSINOPHIL NFR BLD: 0.3 % (ref 0–8)
EOSINOPHIL NFR BLD: 1.6 % (ref 0–8)
ERYTHROCYTE [DISTWIDTH] IN BLOOD BY AUTOMATED COUNT: 14.1 % (ref 11.5–14.5)
ERYTHROCYTE [DISTWIDTH] IN BLOOD BY AUTOMATED COUNT: 14.5 % (ref 11.5–14.5)
EST. GFR  (AFRICAN AMERICAN): >60 ML/MIN/1.73 M^2
EST. GFR  (NON AFRICAN AMERICAN): >60 ML/MIN/1.73 M^2
ESTIMATED AVG GLUCOSE: ABNORMAL MG/DL (ref 68–131)
FLOW: 3
GLUCOSE SERPL-MCNC: 104 MG/DL (ref 70–110)
GLUCOSE SERPL-MCNC: 165 MG/DL (ref 70–110)
GLUCOSE SERPL-MCNC: 176 MG/DL (ref 70–110)
GLUCOSE SERPL-MCNC: 223 MG/DL (ref 70–110)
GLUCOSE SERPL-MCNC: 274 MG/DL (ref 70–110)
GLUCOSE SERPL-MCNC: 301 MG/DL (ref 70–110)
GLUCOSE SERPL-MCNC: 619 MG/DL (ref 70–110)
HBA1C MFR BLD HPLC: >16.9 % (ref 4.5–6.2)
HCO3 UR-SCNC: 21.8 MMOL/L (ref 24–28)
HCT VFR BLD AUTO: 39.7 % (ref 40–54)
HCT VFR BLD AUTO: 40 % (ref 40–54)
HDLC SERPL-MCNC: 28 MG/DL (ref 40–75)
HDLC SERPL: 21.5 % (ref 20–50)
HGB BLD-MCNC: 12.8 G/DL (ref 14–18)
HGB BLD-MCNC: 12.8 G/DL (ref 14–18)
IMM GRANULOCYTES # BLD AUTO: 0.01 K/UL (ref 0–0.04)
IMM GRANULOCYTES # BLD AUTO: 0.02 K/UL (ref 0–0.04)
IMM GRANULOCYTES NFR BLD AUTO: 0.2 % (ref 0–0.5)
IMM GRANULOCYTES NFR BLD AUTO: 0.3 % (ref 0–0.5)
INFLUENZA A, MOLECULAR: NEGATIVE
INFLUENZA B, MOLECULAR: NEGATIVE
INR PPP: 1.2 (ref 0.8–1.2)
LDLC SERPL CALC-MCNC: 87.2 MG/DL (ref 63–159)
LYMPHOCYTES # BLD AUTO: 0.9 K/UL (ref 1–4.8)
LYMPHOCYTES # BLD AUTO: 1 K/UL (ref 1–4.8)
LYMPHOCYTES NFR BLD: 13.1 % (ref 18–48)
LYMPHOCYTES NFR BLD: 19.2 % (ref 18–48)
MAGNESIUM SERPL-MCNC: 2.1 MG/DL (ref 1.6–2.6)
MCH RBC QN AUTO: 26.2 PG (ref 27–31)
MCH RBC QN AUTO: 27.2 PG (ref 27–31)
MCHC RBC AUTO-ENTMCNC: 32 G/DL (ref 32–36)
MCHC RBC AUTO-ENTMCNC: 32.2 G/DL (ref 32–36)
MCV RBC AUTO: 82 FL (ref 82–98)
MCV RBC AUTO: 84 FL (ref 82–98)
MODE: ABNORMAL
MONOCYTES # BLD AUTO: 0.5 K/UL (ref 0.3–1)
MONOCYTES # BLD AUTO: 0.6 K/UL (ref 0.3–1)
MONOCYTES NFR BLD: 8.6 % (ref 4–15)
MONOCYTES NFR BLD: 9.8 % (ref 4–15)
NEUTROPHILS # BLD AUTO: 3.4 K/UL (ref 1.8–7.7)
NEUTROPHILS # BLD AUTO: 5.5 K/UL (ref 1.8–7.7)
NEUTROPHILS NFR BLD: 68.8 % (ref 38–73)
NEUTROPHILS NFR BLD: 77.6 % (ref 38–73)
NONHDLC SERPL-MCNC: 102 MG/DL
NRBC BLD-RTO: 0 /100 WBC
NRBC BLD-RTO: 0 /100 WBC
OPIATES UR QL SCN: NEGATIVE
PCO2 BLDA: 33.5 MMHG (ref 35–45)
PCP UR QL SCN>25 NG/ML: NEGATIVE
PH SMN: 7.42 [PH] (ref 7.35–7.45)
PHOSPHATE SERPL-MCNC: 4 MG/DL (ref 2.7–4.5)
PLATELET # BLD AUTO: 177 K/UL (ref 150–350)
PLATELET # BLD AUTO: 195 K/UL (ref 150–350)
PMV BLD AUTO: 13.8 FL (ref 9.2–12.9)
PMV BLD AUTO: 14 FL (ref 9.2–12.9)
PO2 BLDA: 97 MMHG (ref 80–100)
POC BE: -3 MMOL/L
POC SATURATED O2: 98 % (ref 95–100)
POC TCO2: 23 MMOL/L (ref 23–27)
POCT GLUCOSE: 101 MG/DL (ref 70–110)
POCT GLUCOSE: 193 MG/DL (ref 70–110)
POCT GLUCOSE: 218 MG/DL (ref 70–110)
POCT GLUCOSE: 220 MG/DL (ref 70–110)
POCT GLUCOSE: 233 MG/DL (ref 70–110)
POCT GLUCOSE: 249 MG/DL (ref 70–110)
POCT GLUCOSE: 261 MG/DL (ref 70–110)
POCT GLUCOSE: 306 MG/DL (ref 70–110)
POCT GLUCOSE: 433 MG/DL (ref 70–110)
POCT GLUCOSE: 439 MG/DL (ref 70–110)
POTASSIUM SERPL-SCNC: 3.2 MMOL/L (ref 3.5–5.1)
POTASSIUM SERPL-SCNC: 3.9 MMOL/L (ref 3.5–5.1)
POTASSIUM SERPL-SCNC: 4 MMOL/L (ref 3.5–5.1)
POTASSIUM SERPL-SCNC: 4.8 MMOL/L (ref 3.5–5.1)
PROCALCITONIN SERPL IA-MCNC: 0.1 NG/ML
PROT SERPL-MCNC: 6.4 G/DL (ref 6–8.4)
PROTHROMBIN TIME: 12.4 SEC (ref 9–12.5)
RBC # BLD AUTO: 4.71 M/UL (ref 4.6–6.2)
RBC # BLD AUTO: 4.89 M/UL (ref 4.6–6.2)
SAMPLE: ABNORMAL
SARS-COV-2 RDRP RESP QL NAA+PROBE: NEGATIVE
SITE: ABNORMAL
SODIUM SERPL-SCNC: 127 MMOL/L (ref 136–145)
SODIUM SERPL-SCNC: 133 MMOL/L (ref 136–145)
SODIUM SERPL-SCNC: 135 MMOL/L (ref 136–145)
SODIUM SERPL-SCNC: 138 MMOL/L (ref 136–145)
SPECIMEN SOURCE: NORMAL
TOXICOLOGY INFORMATION: NORMAL
TRIGL SERPL-MCNC: 74 MG/DL (ref 30–150)
TROPONIN I SERPL DL<=0.01 NG/ML-MCNC: 0.41 NG/ML (ref 0.02–0.5)
TSH SERPL DL<=0.005 MIU/L-ACNC: 1.26 UIU/ML (ref 0.34–5.6)
WBC # BLD AUTO: 4.99 K/UL (ref 3.9–12.7)
WBC # BLD AUTO: 7.1 K/UL (ref 3.9–12.7)

## 2020-01-01 PROCEDURE — 63600175 PHARM REV CODE 636 W HCPCS: Performed by: EMERGENCY MEDICINE

## 2020-01-01 PROCEDURE — 94660 CPAP INITIATION&MGMT: CPT

## 2020-01-01 PROCEDURE — 36600 WITHDRAWAL OF ARTERIAL BLOOD: CPT

## 2020-01-01 PROCEDURE — 84484 ASSAY OF TROPONIN QUANT: CPT

## 2020-01-01 PROCEDURE — 99900035 HC TECH TIME PER 15 MIN (STAT)

## 2020-01-01 PROCEDURE — 70450 CT HEAD/BRAIN W/O DYE: CPT | Mod: TC

## 2020-01-01 PROCEDURE — 96372 THER/PROPH/DIAG INJ SC/IM: CPT

## 2020-01-01 PROCEDURE — 21000000 HC CCU ICU ROOM CHARGE

## 2020-01-01 PROCEDURE — 25000003 PHARM REV CODE 250: Performed by: INTERNAL MEDICINE

## 2020-01-01 PROCEDURE — 63600175 PHARM REV CODE 636 W HCPCS: Performed by: INTERNAL MEDICINE

## 2020-01-01 PROCEDURE — 71045 X-RAY EXAM CHEST 1 VIEW: CPT | Mod: TC,FY

## 2020-01-01 PROCEDURE — 25000003 PHARM REV CODE 250: Performed by: FAMILY MEDICINE

## 2020-01-01 PROCEDURE — 27000190 HC CPAP FULL FACE MASK W/VALVE

## 2020-01-01 PROCEDURE — 93010 ELECTROCARDIOGRAM REPORT: CPT | Mod: ,,, | Performed by: INTERNAL MEDICINE

## 2020-01-01 PROCEDURE — 20000000 HC ICU ROOM

## 2020-01-01 PROCEDURE — 94761 N-INVAS EAR/PLS OXIMETRY MLT: CPT

## 2020-01-01 PROCEDURE — 71045 X-RAY EXAM CHEST 1 VIEW: CPT | Mod: 26,,, | Performed by: RADIOLOGY

## 2020-01-01 PROCEDURE — 71045 XR CHEST AP PORTABLE: ICD-10-PCS | Mod: 26,,, | Performed by: RADIOLOGY

## 2020-01-01 PROCEDURE — 80053 COMPREHEN METABOLIC PANEL: CPT

## 2020-01-01 PROCEDURE — 80061 LIPID PANEL: CPT

## 2020-01-01 PROCEDURE — 82803 BLOOD GASES ANY COMBINATION: CPT

## 2020-01-01 PROCEDURE — 80307 DRUG TEST PRSMV CHEM ANLYZR: CPT

## 2020-01-01 PROCEDURE — C9399 UNCLASSIFIED DRUGS OR BIOLOG: HCPCS | Performed by: INTERNAL MEDICINE

## 2020-01-01 PROCEDURE — 85610 PROTHROMBIN TIME: CPT

## 2020-01-01 PROCEDURE — G0426 PR INPT TELEHEALTH CONSULT 50M: ICD-10-PCS | Mod: GT,,, | Performed by: PSYCHIATRY & NEUROLOGY

## 2020-01-01 PROCEDURE — G0426 INPT/ED TELECONSULT50: HCPCS | Mod: GT,,, | Performed by: PSYCHIATRY & NEUROLOGY

## 2020-01-01 PROCEDURE — 97802 MEDICAL NUTRITION INDIV IN: CPT

## 2020-01-01 PROCEDURE — 96375 TX/PRO/DX INJ NEW DRUG ADDON: CPT

## 2020-01-01 PROCEDURE — 80048 BASIC METABOLIC PNL TOTAL CA: CPT

## 2020-01-01 PROCEDURE — 36415 COLL VENOUS BLD VENIPUNCTURE: CPT

## 2020-01-01 PROCEDURE — 87040 BLOOD CULTURE FOR BACTERIA: CPT | Mod: 59

## 2020-01-01 PROCEDURE — 70450 CT HEAD/BRAIN W/O DYE: CPT | Mod: 26,,, | Performed by: RADIOLOGY

## 2020-01-01 PROCEDURE — 63600175 PHARM REV CODE 636 W HCPCS: Performed by: HOSPITALIST

## 2020-01-01 PROCEDURE — 83036 HEMOGLOBIN GLYCOSYLATED A1C: CPT

## 2020-01-01 PROCEDURE — 84100 ASSAY OF PHOSPHORUS: CPT

## 2020-01-01 PROCEDURE — 70450 CT HEAD WITHOUT CONTRAST: ICD-10-PCS | Mod: 26,,, | Performed by: RADIOLOGY

## 2020-01-01 PROCEDURE — 99291 CRITICAL CARE FIRST HOUR: CPT | Mod: 25

## 2020-01-01 PROCEDURE — 85025 COMPLETE CBC W/AUTO DIFF WBC: CPT

## 2020-01-01 PROCEDURE — U0002 COVID-19 LAB TEST NON-CDC: HCPCS

## 2020-01-01 PROCEDURE — 99239 HOSP IP/OBS DSCHRG MGMT >30: CPT | Mod: ,,, | Performed by: FAMILY MEDICINE

## 2020-01-01 PROCEDURE — 85730 THROMBOPLASTIN TIME PARTIAL: CPT

## 2020-01-01 PROCEDURE — 87502 INFLUENZA DNA AMP PROBE: CPT

## 2020-01-01 PROCEDURE — 27000221 HC OXYGEN, UP TO 24 HOURS

## 2020-01-01 PROCEDURE — 84145 PROCALCITONIN (PCT): CPT

## 2020-01-01 PROCEDURE — 82962 GLUCOSE BLOOD TEST: CPT

## 2020-01-01 PROCEDURE — 25000003 PHARM REV CODE 250: Performed by: HOSPITALIST

## 2020-01-01 PROCEDURE — 84443 ASSAY THYROID STIM HORMONE: CPT

## 2020-01-01 PROCEDURE — 99239 PR HOSPITAL DISCHARGE DAY,>30 MIN: ICD-10-PCS | Mod: ,,, | Performed by: FAMILY MEDICINE

## 2020-01-01 PROCEDURE — 83735 ASSAY OF MAGNESIUM: CPT

## 2020-01-01 PROCEDURE — 93010 EKG 12-LEAD: ICD-10-PCS | Mod: ,,, | Performed by: INTERNAL MEDICINE

## 2020-01-01 PROCEDURE — 83880 ASSAY OF NATRIURETIC PEPTIDE: CPT

## 2020-01-01 PROCEDURE — 93005 ELECTROCARDIOGRAM TRACING: CPT

## 2020-01-01 RX ORDER — NAPROXEN SODIUM 220 MG/1
81 TABLET, FILM COATED ORAL DAILY
Status: DISCONTINUED | OUTPATIENT
Start: 2020-01-01 | End: 2020-01-01 | Stop reason: HOSPADM

## 2020-01-01 RX ORDER — ONDANSETRON 2 MG/ML
4 INJECTION INTRAMUSCULAR; INTRAVENOUS EVERY 8 HOURS PRN
Status: DISCONTINUED | OUTPATIENT
Start: 2020-01-01 | End: 2020-01-01 | Stop reason: HOSPADM

## 2020-01-01 RX ORDER — MUPIROCIN 20 MG/G
OINTMENT TOPICAL 2 TIMES DAILY
Status: DISCONTINUED | OUTPATIENT
Start: 2020-01-01 | End: 2020-01-01 | Stop reason: HOSPADM

## 2020-01-01 RX ORDER — METOPROLOL TARTRATE 25 MG/1
75 TABLET, FILM COATED ORAL 2 TIMES DAILY
Status: DISCONTINUED | OUTPATIENT
Start: 2020-01-01 | End: 2020-01-01 | Stop reason: HOSPADM

## 2020-01-01 RX ORDER — CLONIDINE HYDROCHLORIDE 0.1 MG/1
0.1 TABLET ORAL 3 TIMES DAILY PRN
Qty: 30 TABLET | Refills: 2 | Status: ON HOLD
Start: 2020-01-01 | End: 2020-01-01

## 2020-01-01 RX ORDER — METOPROLOL TARTRATE 1 MG/ML
5 INJECTION, SOLUTION INTRAVENOUS EVERY 5 MIN PRN
Status: DISCONTINUED | OUTPATIENT
Start: 2020-01-01 | End: 2020-01-01 | Stop reason: HOSPADM

## 2020-01-01 RX ORDER — GLUCAGON 1 MG
1 KIT INJECTION
Status: DISCONTINUED | OUTPATIENT
Start: 2020-01-01 | End: 2020-01-01 | Stop reason: HOSPADM

## 2020-01-01 RX ORDER — METOPROLOL TARTRATE 25 MG/1
50 TABLET, FILM COATED ORAL EVERY 8 HOURS
Status: DISCONTINUED | OUTPATIENT
Start: 2020-01-01 | End: 2020-01-01

## 2020-01-01 RX ORDER — METFORMIN HYDROCHLORIDE 500 MG/1
500 TABLET ORAL 2 TIMES DAILY
Qty: 60 TABLET | Refills: 1 | Status: SHIPPED | OUTPATIENT
Start: 2020-01-01

## 2020-01-01 RX ORDER — AMOXICILLIN 250 MG
1 CAPSULE ORAL DAILY PRN
Status: DISCONTINUED | OUTPATIENT
Start: 2020-01-01 | End: 2020-01-01 | Stop reason: HOSPADM

## 2020-01-01 RX ORDER — EPLERENONE 25 MG/1
25 TABLET, FILM COATED ORAL DAILY
Status: DISCONTINUED | OUTPATIENT
Start: 2020-01-02 | End: 2020-01-03 | Stop reason: HOSPADM

## 2020-01-01 RX ORDER — FUROSEMIDE 10 MG/ML
60 INJECTION INTRAMUSCULAR; INTRAVENOUS 3 TIMES DAILY
Status: DISCONTINUED | OUTPATIENT
Start: 2020-01-01 | End: 2020-01-01 | Stop reason: HOSPADM

## 2020-01-01 RX ORDER — FUROSEMIDE 40 MG/1
40 TABLET ORAL DAILY
Status: DISCONTINUED | OUTPATIENT
Start: 2020-01-02 | End: 2020-01-03 | Stop reason: HOSPADM

## 2020-01-01 RX ORDER — METOPROLOL SUCCINATE 50 MG/1
50 TABLET, EXTENDED RELEASE ORAL 2 TIMES DAILY
Status: DISCONTINUED | OUTPATIENT
Start: 2020-01-01 | End: 2020-01-03 | Stop reason: HOSPADM

## 2020-01-01 RX ORDER — MORPHINE SULFATE 4 MG/ML
2 INJECTION, SOLUTION INTRAMUSCULAR; INTRAVENOUS EVERY 4 HOURS PRN
Status: DISCONTINUED | OUTPATIENT
Start: 2020-01-01 | End: 2020-01-01

## 2020-01-01 RX ORDER — CLONIDINE HYDROCHLORIDE 0.1 MG/1
0.1 TABLET ORAL 3 TIMES DAILY PRN
Qty: 30 TABLET | Refills: 2
Start: 2020-01-01 | End: 2020-01-01 | Stop reason: SDUPTHER

## 2020-01-01 RX ORDER — IPRATROPIUM BROMIDE AND ALBUTEROL SULFATE 2.5; .5 MG/3ML; MG/3ML
3 SOLUTION RESPIRATORY (INHALATION) EVERY 4 HOURS PRN
Status: DISCONTINUED | OUTPATIENT
Start: 2020-01-01 | End: 2020-01-01 | Stop reason: HOSPADM

## 2020-01-01 RX ORDER — DIGOXIN 250 MCG
0.25 TABLET ORAL DAILY
Status: DISCONTINUED | OUTPATIENT
Start: 2020-01-01 | End: 2020-01-03

## 2020-01-01 RX ORDER — LISINOPRIL 10 MG/1
10 TABLET ORAL 2 TIMES DAILY
Status: DISCONTINUED | OUTPATIENT
Start: 2020-01-01 | End: 2020-01-02

## 2020-01-01 RX ORDER — POTASSIUM CHLORIDE 20 MEQ/1
40 TABLET, EXTENDED RELEASE ORAL ONCE
Status: COMPLETED | OUTPATIENT
Start: 2020-01-01 | End: 2020-01-01

## 2020-01-01 RX ORDER — AZITHROMYCIN 250 MG/1
500 TABLET, FILM COATED ORAL DAILY
Status: DISCONTINUED | OUTPATIENT
Start: 2020-01-01 | End: 2020-01-01

## 2020-01-01 RX ORDER — LISINOPRIL 20 MG/1
20 TABLET ORAL 2 TIMES DAILY
Qty: 180 TABLET | Refills: 3 | Status: SHIPPED | OUTPATIENT
Start: 2020-01-01 | End: 2021-09-08

## 2020-01-01 RX ORDER — IBUPROFEN 200 MG
1 TABLET ORAL DAILY PRN
Status: DISCONTINUED | OUTPATIENT
Start: 2020-01-01 | End: 2020-01-01 | Stop reason: HOSPADM

## 2020-01-01 RX ORDER — FUROSEMIDE 40 MG/1
40 TABLET ORAL DAILY
Qty: 30 TABLET | Refills: 2 | Status: ON HOLD | OUTPATIENT
Start: 2020-01-01 | End: 2020-01-01

## 2020-01-01 RX ORDER — TALC
6 POWDER (GRAM) TOPICAL NIGHTLY PRN
Status: DISCONTINUED | OUTPATIENT
Start: 2020-01-01 | End: 2020-01-01 | Stop reason: HOSPADM

## 2020-01-01 RX ORDER — ACETAMINOPHEN 325 MG/1
650 TABLET ORAL EVERY 4 HOURS PRN
Status: DISCONTINUED | OUTPATIENT
Start: 2020-01-01 | End: 2020-01-01 | Stop reason: HOSPADM

## 2020-01-01 RX ORDER — HYDROXYZINE PAMOATE 25 MG/1
25 CAPSULE ORAL EVERY 8 HOURS PRN
Status: DISCONTINUED | OUTPATIENT
Start: 2020-01-01 | End: 2020-01-01 | Stop reason: HOSPADM

## 2020-01-01 RX ORDER — SODIUM CHLORIDE 0.9 % (FLUSH) 0.9 %
10 SYRINGE (ML) INJECTION
Status: DISCONTINUED | OUTPATIENT
Start: 2020-01-01 | End: 2020-01-01 | Stop reason: HOSPADM

## 2020-01-01 RX ORDER — INSULIN ASPART 100 [IU]/ML
1-10 INJECTION, SOLUTION INTRAVENOUS; SUBCUTANEOUS
Status: DISCONTINUED | OUTPATIENT
Start: 2020-01-01 | End: 2020-01-01 | Stop reason: HOSPADM

## 2020-01-01 RX ORDER — LISINOPRIL 10 MG/1
20 TABLET ORAL DAILY
Status: DISCONTINUED | OUTPATIENT
Start: 2020-01-01 | End: 2020-01-01 | Stop reason: HOSPADM

## 2020-01-01 RX ORDER — IBUPROFEN 200 MG
16 TABLET ORAL
Status: DISCONTINUED | OUTPATIENT
Start: 2020-01-01 | End: 2020-01-01 | Stop reason: HOSPADM

## 2020-01-01 RX ORDER — POTASSIUM CHLORIDE 20 MEQ/1
20 TABLET, EXTENDED RELEASE ORAL DAILY
Status: DISCONTINUED | OUTPATIENT
Start: 2020-01-01 | End: 2020-01-01 | Stop reason: HOSPADM

## 2020-01-01 RX ORDER — FUROSEMIDE 10 MG/ML
40 INJECTION INTRAMUSCULAR; INTRAVENOUS
Status: COMPLETED | OUTPATIENT
Start: 2020-01-01 | End: 2020-01-01

## 2020-01-01 RX ADMIN — INSULIN DETEMIR 25 UNITS: 100 INJECTION, SOLUTION SUBCUTANEOUS at 08:12

## 2020-01-01 RX ADMIN — LISINOPRIL 10 MG: 5 TABLET ORAL at 09:01

## 2020-01-01 RX ADMIN — PANTOPRAZOLE SODIUM 40 MG: 40 TABLET, DELAYED RELEASE ORAL at 08:01

## 2020-01-01 RX ADMIN — APIXABAN 5 MG: 5 TABLET, FILM COATED ORAL at 08:01

## 2020-01-01 RX ADMIN — LISINOPRIL 5 MG: 5 TABLET ORAL at 01:01

## 2020-01-01 RX ADMIN — POTASSIUM CHLORIDE 40 MEQ: 1500 TABLET, EXTENDED RELEASE ORAL at 09:12

## 2020-01-01 RX ADMIN — FUROSEMIDE 60 MG: 10 INJECTION, SOLUTION INTRAMUSCULAR; INTRAVENOUS at 02:12

## 2020-01-01 RX ADMIN — INSULIN ASPART 4 UNITS: 100 INJECTION, SOLUTION INTRAVENOUS; SUBCUTANEOUS at 09:12

## 2020-01-01 RX ADMIN — INSULIN ASPART 2 UNITS: 100 INJECTION, SOLUTION INTRAVENOUS; SUBCUTANEOUS at 08:12

## 2020-01-01 RX ADMIN — POTASSIUM CHLORIDE 20 MEQ: 1500 TABLET, EXTENDED RELEASE ORAL at 08:12

## 2020-01-01 RX ADMIN — LISINOPRIL 5 MG: 5 TABLET ORAL at 08:01

## 2020-01-01 RX ADMIN — CHLORHEXIDINE GLUCONATE 15 ML: 1.2 RINSE ORAL at 09:01

## 2020-01-01 RX ADMIN — HYDROXYZINE PAMOATE 25 MG: 25 CAPSULE ORAL at 01:12

## 2020-01-01 RX ADMIN — ASPIRIN 81 MG: 81 TABLET, CHEWABLE ORAL at 09:12

## 2020-01-01 RX ADMIN — APIXABAN 5 MG: 2.5 TABLET, FILM COATED ORAL at 08:12

## 2020-01-01 RX ADMIN — DIGOXIN 0.25 MG: 250 TABLET ORAL at 03:01

## 2020-01-01 RX ADMIN — INSULIN HUMAN 5 UNITS: 100 INJECTION, SOLUTION PARENTERAL at 02:12

## 2020-01-01 RX ADMIN — METOPROLOL SUCCINATE 50 MG: 50 TABLET, FILM COATED, EXTENDED RELEASE ORAL at 09:01

## 2020-01-01 RX ADMIN — INSULIN ASPART 4 UNITS: 100 INJECTION, SOLUTION INTRAVENOUS; SUBCUTANEOUS at 05:12

## 2020-01-01 RX ADMIN — FUROSEMIDE 60 MG: 10 INJECTION, SOLUTION INTRAMUSCULAR; INTRAVENOUS at 08:12

## 2020-01-01 RX ADMIN — LISINOPRIL 20 MG: 10 TABLET ORAL at 08:12

## 2020-01-01 RX ADMIN — Medication 6 MG: at 08:12

## 2020-01-01 RX ADMIN — METOPROLOL TARTRATE 75 MG: 25 TABLET, FILM COATED ORAL at 08:12

## 2020-01-01 RX ADMIN — INSULIN ASPART 2 UNITS: 100 INJECTION, SOLUTION INTRAVENOUS; SUBCUTANEOUS at 09:01

## 2020-01-01 RX ADMIN — METOPROLOL SUCCINATE 50 MG: 50 TABLET, FILM COATED, EXTENDED RELEASE ORAL at 01:01

## 2020-01-01 RX ADMIN — CHLORHEXIDINE GLUCONATE 15 ML: 1.2 RINSE ORAL at 08:01

## 2020-01-01 RX ADMIN — INSULIN ASPART 8 UNITS: 100 INJECTION, SOLUTION INTRAVENOUS; SUBCUTANEOUS at 11:12

## 2020-01-01 RX ADMIN — METOPROLOL TARTRATE 50 MG: 25 TABLET, FILM COATED ORAL at 05:12

## 2020-01-01 RX ADMIN — CEFTRIAXONE 1 G: 1 INJECTION, SOLUTION INTRAVENOUS at 01:12

## 2020-01-01 RX ADMIN — POLYETHYLENE GLYCOL 3350 17 G: 17 POWDER, FOR SOLUTION ORAL at 09:01

## 2020-01-01 RX ADMIN — INSULIN ASPART 3 UNITS: 100 INJECTION, SOLUTION INTRAVENOUS; SUBCUTANEOUS at 11:01

## 2020-01-01 RX ADMIN — MUPIROCIN: 20 OINTMENT TOPICAL at 09:01

## 2020-01-01 RX ADMIN — APIXABAN 5 MG: 2.5 TABLET, FILM COATED ORAL at 09:12

## 2020-01-01 RX ADMIN — INSULIN DETEMIR 25 UNITS: 100 INJECTION, SOLUTION SUBCUTANEOUS at 09:12

## 2020-01-01 RX ADMIN — CLONIDINE HYDROCHLORIDE 0.1 MG: 0.1 TABLET ORAL at 07:01

## 2020-01-01 RX ADMIN — APIXABAN 5 MG: 5 TABLET, FILM COATED ORAL at 09:01

## 2020-01-01 RX ADMIN — INSULIN DETEMIR 25 UNITS: 100 INJECTION, SOLUTION SUBCUTANEOUS at 05:12

## 2020-01-01 RX ADMIN — FUROSEMIDE 60 MG: 10 INJECTION, SOLUTION INTRAMUSCULAR; INTRAVENOUS at 09:12

## 2020-01-01 RX ADMIN — INSULIN ASPART 10 UNITS: 100 INJECTION, SOLUTION INTRAVENOUS; SUBCUTANEOUS at 12:12

## 2020-01-01 RX ADMIN — METOPROLOL TARTRATE 75 MG: 25 TABLET, FILM COATED ORAL at 12:12

## 2020-01-01 RX ADMIN — FUROSEMIDE 60 MG: 10 INJECTION, SOLUTION INTRAMUSCULAR; INTRAVENOUS at 07:12

## 2020-01-01 RX ADMIN — INSULIN ASPART 6 UNITS: 100 INJECTION, SOLUTION INTRAVENOUS; SUBCUTANEOUS at 05:12

## 2020-01-01 RX ADMIN — ASPIRIN 81 MG: 81 TABLET, CHEWABLE ORAL at 08:12

## 2020-01-01 RX ADMIN — LISINOPRIL 20 MG: 10 TABLET ORAL at 09:12

## 2020-01-01 RX ADMIN — MORPHINE SULFATE 2 MG: 4 INJECTION, SOLUTION INTRAMUSCULAR; INTRAVENOUS at 09:12

## 2020-01-01 RX ADMIN — FUROSEMIDE 40 MG: 10 INJECTION, SOLUTION INTRAMUSCULAR; INTRAVENOUS at 02:12

## 2020-01-01 RX ADMIN — POTASSIUM CHLORIDE 20 MEQ: 1500 TABLET, EXTENDED RELEASE ORAL at 09:12

## 2020-01-01 RX ADMIN — INSULIN DETEMIR 10 UNITS: 100 INJECTION, SOLUTION SUBCUTANEOUS at 09:01

## 2020-01-01 RX ADMIN — METOPROLOL TARTRATE 25 MG: 25 TABLET ORAL at 08:01

## 2020-01-01 RX ADMIN — FUROSEMIDE 40 MG: 10 INJECTION, SOLUTION INTRAMUSCULAR; INTRAVENOUS at 08:01

## 2020-01-01 RX ADMIN — INSULIN HUMAN 10 UNITS: 100 INJECTION, SOLUTION PARENTERAL at 02:12

## 2020-01-01 RX ADMIN — FUROSEMIDE 60 MG: 10 INJECTION, SOLUTION INTRAMUSCULAR; INTRAVENOUS at 03:12

## 2020-01-01 NOTE — PLAN OF CARE
Plan of care reviewed with patient. Goals are progressing towards discharge. Blood glucose in desired range is not progressing. Had a lengthy conversation with patient and partner at bedside about better choices when it comes to diet due to new diabetes. Patient agreed that he will have to improve. Patient approaching discharge.   1/1/2020  3:52 AM  Geni Mccarthy RN      Problem: Fall Injury Risk  Goal: Absence of Fall and Fall-Related Injury  Outcome: Ongoing, Progressing     Problem: Adult Inpatient Plan of Care  Goal: Plan of Care Review  Outcome: Ongoing, Progressing  Goal: Patient-Specific Goal (Individualization)  Outcome: Ongoing, Progressing  Goal: Absence of Hospital-Acquired Illness or Injury  Outcome: Ongoing, Progressing  Goal: Optimal Comfort and Wellbeing  Outcome: Ongoing, Progressing  Goal: Readiness for Transition of Care  Outcome: Ongoing, Progressing  Goal: Rounds/Family Conference  Outcome: Ongoing, Progressing     Problem: Adjustment to Illness (Heart Failure)  Goal: Optimal Coping  Outcome: Ongoing, Progressing     Problem: Arrhythmia/Dysrhythmia (Heart Failure)  Goal: Stable Heart Rate and Rhythm  Outcome: Ongoing, Progressing     Problem: Functional Ability Impaired (Heart Failure)  Goal: Optimal Functional Ability  Outcome: Ongoing, Progressing     Problem: Arrhythmia/Dysrhythmia  Goal: Normalized Cardiac Rhythm  Outcome: Ongoing, Progressing

## 2020-01-01 NOTE — PROGRESS NOTES
[facility] Medicine  Progress Note    Patient Name: Jarret Jessica  MRN: [id number]  Patient Class: IP- Inpatient   Admission Date: 12/29/2019  Length of Stay: 3 days  Attending Physician: Deep Moran MD  Primary Care Provider: Primary Doctor No        Subjective:     Principal Problem:Atrial fibrillation with RVR    Interval History:     The rate is better controlled overnight but occasionally increased 120s on patient move around    Review of Systems  Objective:     Vital Signs (Most Recent):  Temp: 98 °F (36.7 °C) (01/01/20 1100)  Pulse: 90 (01/01/20 1324)  Resp: 17 (01/01/20 1300)  BP: (!) 141/77 (01/01/20 1324)  SpO2: (!) 92 % (01/01/20 1300) Vital Signs (24h Range):  Temp:  [98 °F (36.7 °C)-98.2 °F (36.8 °C)] 98 °F (36.7 °C)  Pulse:  [] 90  Resp:  [11-36] 17  SpO2:  [85 %-98 %] 92 %  BP: (105-193)/() 141/77     Weight: 117.5 kg (259 lb 0.7 oz)  Body mass index is 36.13 kg/m².    Intake/Output Summary (Last 24 hours) at 1/1/2020 1517  Last data filed at 1/1/2020 0600  Gross per 24 hour   Intake 640 ml   Output 550 ml   Net 90 ml      Physical Exam  Physical Exam:  General- Patient alert and oriented x3 in NAD  HEENT- PERRLA, EOMI, OP clear, MMM  Neck- No JVD, Lymphadenopathy, Thyromegaly  CV- irregular  rate and rhythm, No Murmur/tobi/rubs  Resp-clear lungs   GI- Non tender/non-distended, BS normoactive x4 quads,  Extrem- No cyanosis, clubbing, edema. Pulses 2+ and symmetric  Neuro- awake and alert , no weakness  Skin-  No masses, rashes or lesions noted on cursory skin exam.    Overview/Hospital Course:     Significant Labs:   CBC:   Recent Labs   Lab 12/31/19  0336 01/01/20  0316   WBC 5.73 4.99   HGB 12.7* 12.8*   HCT 39.5* 40.0    177     CMP:   Recent Labs   Lab 12/31/19  0336 01/01/20  0316    138   K 3.5 3.9    102   CO2 27 28   * 223*   BUN 16 20   CREATININE 1.3 1.2   CALCIUM 8.4* 8.9   ANIONGAP 10 8   EGFRNONAA >60.0 >60.0        Significant Imaging: I have reviewed all pertinent imaging results/findings within the past 24 hours.    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  Possible need for cardioversion when more stable   Follow cardiology   Downgrade to card a         Active Diagnoses:    Diagnosis Date Noted POA    PRINCIPAL PROBLEM:  Atrial fibrillation with RVR [I48.91] 12/29/2019 Yes    Elevated troponin [R79.89] 12/29/2019 Yes    Hyperglycemia [R73.9] 12/29/2019 Yes    Newly diagnosed diabetes [E11.9] 12/29/2019 Yes    Constipation [K59.00] 12/29/2019 Yes    Acute congestive heart failure [I50.9] 12/29/2019 Yes    BRI (obstructive sleep apnea) [G47.33] 12/29/2019 Yes    NSTEMI (non-ST elevated myocardial infarction) [I21.4] 12/29/2019 Yes    Tobacco abuse [Z72.0] 12/29/2019 Yes    Essential hypertension, benign [I10] 09/18/2012 Yes     Chronic      Problems Resolved During this Admission:     VTE Risk Mitigation (From admission, onward)         Ordered     apixaban tablet 5 mg  2 times daily      12/31/19 1650                   Deep Moran MD  Department of Hospital Medicine   [facility]

## 2020-01-01 NOTE — PLAN OF CARE
This note also relates to the following rows which could not be included:  SpO2 - Cannot attach notes to unvalidated device data  Pulse - Cannot attach notes to unvalidated device data  Resp - Cannot attach notes to unvalidated device data       01/01/20 0800   Patient Assessment/Suction   Level of Consciousness (AVPU) alert   PRE-TX-O2   O2 Device (Oxygen Therapy) nasal cannula   $ Is the patient on Low Flow Oxygen? Yes   Flow (L/min) 3   Pulse Oximetry Type Continuous   $ Pulse Oximetry - Multiple Charge Pulse Oximetry - Multiple   Preset CPAP/BiPAP Settings   Mode Of Delivery CPAP;Standby   $ CPAP/BiPAP Daily Charge BiPAP/CPAP Daily   $ Is patient using? No/refused   Equipment Type DeVilbiss   patient has cpap available did not want to wear last night

## 2020-01-01 NOTE — PROGRESS NOTES
[facility]  Cardiology  Progress Note    Patient Name: Jarret Jessica  MRN: [id number]  Admission Date: 12/29/2019  Hospital Length of Stay: 3 days  Code Status: Full Code   Attending Physician: Deep Moran MD   Primary Care Physician: Primary Doctor No  Expected Discharge Date:   Principal Problem:Atrial fibrillation with RVR    Subjective:     Hospital Course: Tachycardia is better, required clonidine for control of blood pressure.  Interval History: Feels better, had good shower.    ROS  Objective:     Vital Signs (Most Recent):  Temp: 98 °F (36.7 °C) (01/01/20 0400)  Pulse: 82 (01/01/20 0800)  Resp: (!) 22 (01/01/20 0800)  BP: 135/76 (01/01/20 0847)  SpO2: (!) 94 % (01/01/20 0800) Vital Signs (24h Range):  Temp:  [98 °F (36.7 °C)-98.7 °F (37.1 °C)] 98 °F (36.7 °C)  Pulse:  [] 82  Resp:  [11-38] 22  SpO2:  [85 %-98 %] 94 %  BP: (110-193)/() 135/76     Weight: 117.5 kg (259 lb 0.7 oz)  Body mass index is 36.13 kg/m².    SpO2: (!) 94 %  O2 Device (Oxygen Therapy): nasal cannula      Intake/Output Summary (Last 24 hours) at 1/1/2020 1145  Last data filed at 1/1/2020 0600  Gross per 24 hour   Intake 640 ml   Output 750 ml   Net -110 ml       Lines/Drains/Airways     Peripheral Intravenous Line                 Peripheral IV - Single Lumen 12/29/19 0320 18 G Right Forearm 3 days                Physical ExamH.R 88, afib, normal heart sounds, lungs clear.    Significant Labs:   BMP:   Recent Labs   Lab 12/31/19  0336 01/01/20  0316   * 223*    138   K 3.5 3.9    102   CO2 27 28   BUN 16 20   CREATININE 1.3 1.2   CALCIUM 8.4* 8.9   MG 1.9 2.1       Significant Imaging:   Assessment and Plan:  Acute Systolic CHF  Hypertension  Persistent Atrial fibrillation  Plan  Change to Oral lasix 40 mg qday  Add eplerenone 25 mg qd  Increase lisinopril 10 mg po bid  Change Metoprolol to succinate 50 mg bid  Digoxin 0.25 mg po qday.  Need to be in hospital atleast 48 hours more to titrate  meds.       Brief HPI:     Active Diagnoses:    Diagnosis Date Noted POA    PRINCIPAL PROBLEM:  Atrial fibrillation with RVR [I48.91] 12/29/2019 Yes    Elevated troponin [R79.89] 12/29/2019 Yes    Hyperglycemia [R73.9] 12/29/2019 Yes    Newly diagnosed diabetes [E11.9] 12/29/2019 Yes    Constipation [K59.00] 12/29/2019 Yes    Acute congestive heart failure [I50.9] 12/29/2019 Yes    BRI (obstructive sleep apnea) [G47.33] 12/29/2019 Yes    NSTEMI (non-ST elevated myocardial infarction) [I21.4] 12/29/2019 Yes    Tobacco abuse [Z72.0] 12/29/2019 Yes    Essential hypertension, benign [I10] 09/18/2012 Yes     Chronic      Problems Resolved During this Admission:       VTE Risk Mitigation (From admission, onward)         Ordered     apixaban tablet 5 mg  2 times daily      12/31/19 3160                Sheree Mcintyre MD  Cardiology  [facility]

## 2020-01-01 NOTE — PLAN OF CARE
12/31/19 2020   PRE-TX-O2   O2 Device (Oxygen Therapy) room air   SpO2 95 %   Pulse Oximetry Type Continuous   $ Pulse Oximetry - Multiple Charge Pulse Oximetry - Multiple   Pulse 86   Resp (!) 36   Preset CPAP/BiPAP Settings   Mode Of Delivery   (refused cpap)

## 2020-01-02 LAB
ANION GAP SERPL CALC-SCNC: 9 MMOL/L (ref 8–16)
BASOPHILS # BLD AUTO: 0.01 K/UL (ref 0–0.2)
BASOPHILS NFR BLD: 0.2 % (ref 0–1.9)
BUN SERPL-MCNC: 21 MG/DL (ref 6–20)
CALCIUM SERPL-MCNC: 8.8 MG/DL (ref 8.7–10.5)
CHLORIDE SERPL-SCNC: 102 MMOL/L (ref 95–110)
CO2 SERPL-SCNC: 27 MMOL/L (ref 23–29)
CREAT SERPL-MCNC: 1.4 MG/DL (ref 0.5–1.4)
DIFFERENTIAL METHOD: ABNORMAL
EOSINOPHIL # BLD AUTO: 0.1 K/UL (ref 0–0.5)
EOSINOPHIL NFR BLD: 2 % (ref 0–8)
ERYTHROCYTE [DISTWIDTH] IN BLOOD BY AUTOMATED COUNT: 14.2 % (ref 11.5–14.5)
EST. GFR  (AFRICAN AMERICAN): >60 ML/MIN/1.73 M^2
EST. GFR  (NON AFRICAN AMERICAN): 55.8 ML/MIN/1.73 M^2
GLUCOSE SERPL-MCNC: 135 MG/DL (ref 70–110)
GLUCOSE SERPL-MCNC: 205 MG/DL (ref 70–110)
GLUCOSE SERPL-MCNC: 234 MG/DL (ref 70–110)
GLUCOSE SERPL-MCNC: 252 MG/DL (ref 70–110)
HCT VFR BLD AUTO: 38 % (ref 40–54)
HGB BLD-MCNC: 12.1 G/DL (ref 14–18)
IMM GRANULOCYTES # BLD AUTO: 0.01 K/UL (ref 0–0.04)
IMM GRANULOCYTES NFR BLD AUTO: 0.2 % (ref 0–0.5)
LYMPHOCYTES # BLD AUTO: 1.1 K/UL (ref 1–4.8)
LYMPHOCYTES NFR BLD: 21 % (ref 18–48)
MAGNESIUM SERPL-MCNC: 2 MG/DL (ref 1.6–2.6)
MCH RBC QN AUTO: 26.1 PG (ref 27–31)
MCHC RBC AUTO-ENTMCNC: 31.8 G/DL (ref 32–36)
MCV RBC AUTO: 82 FL (ref 82–98)
MONOCYTES # BLD AUTO: 0.6 K/UL (ref 0.3–1)
MONOCYTES NFR BLD: 11.4 % (ref 4–15)
NEUTROPHILS # BLD AUTO: 3.5 K/UL (ref 1.8–7.7)
NEUTROPHILS NFR BLD: 65.2 % (ref 38–73)
NRBC BLD-RTO: 0 /100 WBC
PHOSPHATE SERPL-MCNC: 4.2 MG/DL (ref 2.7–4.5)
PLATELET # BLD AUTO: 159 K/UL (ref 150–350)
PMV BLD AUTO: 13.9 FL (ref 9.2–12.9)
POTASSIUM SERPL-SCNC: 3.9 MMOL/L (ref 3.5–5.1)
RBC # BLD AUTO: 4.64 M/UL (ref 4.6–6.2)
SODIUM SERPL-SCNC: 138 MMOL/L (ref 136–145)
WBC # BLD AUTO: 5.43 K/UL (ref 3.9–12.7)

## 2020-01-02 PROCEDURE — 36415 COLL VENOUS BLD VENIPUNCTURE: CPT

## 2020-01-02 PROCEDURE — 63600175 PHARM REV CODE 636 W HCPCS: Performed by: INTERNAL MEDICINE

## 2020-01-02 PROCEDURE — 25000003 PHARM REV CODE 250: Performed by: INTERNAL MEDICINE

## 2020-01-02 PROCEDURE — 25000003 PHARM REV CODE 250: Performed by: NURSE PRACTITIONER

## 2020-01-02 PROCEDURE — C9399 UNCLASSIFIED DRUGS OR BIOLOG: HCPCS | Performed by: INTERNAL MEDICINE

## 2020-01-02 PROCEDURE — 94761 N-INVAS EAR/PLS OXIMETRY MLT: CPT

## 2020-01-02 PROCEDURE — 85025 COMPLETE CBC W/AUTO DIFF WBC: CPT

## 2020-01-02 PROCEDURE — 82962 GLUCOSE BLOOD TEST: CPT

## 2020-01-02 PROCEDURE — 83735 ASSAY OF MAGNESIUM: CPT

## 2020-01-02 PROCEDURE — 21400001 HC TELEMETRY ROOM

## 2020-01-02 PROCEDURE — 84100 ASSAY OF PHOSPHORUS: CPT

## 2020-01-02 PROCEDURE — 80048 BASIC METABOLIC PNL TOTAL CA: CPT

## 2020-01-02 RX ORDER — LISINOPRIL 20 MG/1
20 TABLET ORAL 2 TIMES DAILY
Status: DISCONTINUED | OUTPATIENT
Start: 2020-01-02 | End: 2020-01-03 | Stop reason: HOSPADM

## 2020-01-02 RX ADMIN — FUROSEMIDE 40 MG: 40 TABLET ORAL at 10:01

## 2020-01-02 RX ADMIN — DIGOXIN 0.25 MG: 250 TABLET ORAL at 10:01

## 2020-01-02 RX ADMIN — INSULIN DETEMIR 10 UNITS: 100 INJECTION, SOLUTION SUBCUTANEOUS at 10:01

## 2020-01-02 RX ADMIN — APIXABAN 5 MG: 5 TABLET, FILM COATED ORAL at 09:01

## 2020-01-02 RX ADMIN — METOPROLOL SUCCINATE 50 MG: 50 TABLET, FILM COATED, EXTENDED RELEASE ORAL at 10:01

## 2020-01-02 RX ADMIN — LISINOPRIL 20 MG: 20 TABLET ORAL at 09:01

## 2020-01-02 RX ADMIN — MUPIROCIN: 20 OINTMENT TOPICAL at 10:01

## 2020-01-02 RX ADMIN — EPLERENONE 25 MG: 25 TABLET, FILM COATED ORAL at 10:01

## 2020-01-02 RX ADMIN — METOPROLOL SUCCINATE 50 MG: 50 TABLET, FILM COATED, EXTENDED RELEASE ORAL at 09:01

## 2020-01-02 RX ADMIN — LISINOPRIL 10 MG: 5 TABLET ORAL at 10:01

## 2020-01-02 RX ADMIN — PANTOPRAZOLE SODIUM 40 MG: 40 TABLET, DELAYED RELEASE ORAL at 10:01

## 2020-01-02 RX ADMIN — CHLORHEXIDINE GLUCONATE 15 ML: 1.2 RINSE ORAL at 10:01

## 2020-01-02 RX ADMIN — CHLORHEXIDINE GLUCONATE 15 ML: 1.2 RINSE ORAL at 09:01

## 2020-01-02 RX ADMIN — APIXABAN 5 MG: 5 TABLET, FILM COATED ORAL at 10:01

## 2020-01-02 RX ADMIN — INSULIN ASPART 2 UNITS: 100 INJECTION, SOLUTION INTRAVENOUS; SUBCUTANEOUS at 01:01

## 2020-01-02 NOTE — PROGRESS NOTES
[facility]  Cardiology  Progress Note    Patient Name: Jarret Jessica  MRN: [id number]  Admission Date: 12/29/2019  Hospital Length of Stay: 4 days  Code Status: Full Code   Attending Physician: Deep Moran MD   Primary Care Physician: Primary Doctor No  Expected Discharge Date:   Principal Problem:Atrial fibrillation with RVR    Subjective:     Interval History:  Denies chest pain or shortness of breath  HR remains atrial fibrillation in the 80s    ROS   No significant headaches or sore throat or runny nose.   No recent changes in vision.   No recent changes in hearing.  No dysphagia or odynophagia.  Denies chest pain and shortness of breath at baseline.   Denies any cough or hemoptysis.   Denies any abdominal pain, nausea, vomiting, diarrhea or constipation.   Denies any dysuria or polyuria.   Denies any fevers or chills.   Denies any recent significant weight changes.   Denies bleeding diathesis    Objective:     Vital Signs (Most Recent):  Temp: 98 °F (36.7 °C) (01/02/20 1200)  Pulse: 80 (01/02/20 1300)  Resp: 20 (01/02/20 1200)  BP: (!) 168/97 (01/02/20 1300)  SpO2: 98 % (01/02/20 1307) Vital Signs (24h Range):  Temp:  [97.4 °F (36.3 °C)-98.7 °F (37.1 °C)] 98 °F (36.7 °C)  Pulse:  [75-84] 80  Resp:  [12-26] 20  SpO2:  [90 %-100 %] 98 %  BP: (134-168)/(84-97) 168/97     Weight: 117.8 kg (259 lb 11.2 oz)  Body mass index is 36.22 kg/m².    SpO2: 98 %  O2 Device (Oxygen Therapy): room air      Intake/Output Summary (Last 24 hours) at 1/2/2020 1340  Last data filed at 1/2/2020 1327  Gross per 24 hour   Intake 690 ml   Output --   Net 690 ml       Lines/Drains/Airways     Peripheral Intravenous Line                 Peripheral IV - Single Lumen 12/29/19 0320 18 G Right Forearm 4 days                Physical Exam   HEENT: Normocephalic, atraumatic, PERRL, Conjunctiva pink, no scleral icterus.   CVS: S1S2+, Irregular, +SM, no rubs or gallops, JVP: Normal.  LUNGS: Clear  ABDOMEN: Soft, NT,  BS+  EXTREMITIES: No cyanosis, clubbing or edema  NEURO: AAO X 3.   Right wrist: CDI       Significant Labs:   BMP:   Recent Labs   Lab 01/01/20  0316 01/02/20  0402   * 252*    138   K 3.9 3.9    102   CO2 28 27   BUN 20 21*   CREATININE 1.2 1.4   CALCIUM 8.9 8.8   MG 2.1 2.0       Significant Imaging:   Assessment and Plan:  Acute Systolic CHF  Hypertension  Persistent Atrial fibrillation  Plan  Increase lisinopril to 20 mg bid to optimize bp control.  Consider cardioversion as an outpatient.   Possible DC home tomorrow.        Brief HPI:     Active Diagnoses:    Diagnosis Date Noted POA    PRINCIPAL PROBLEM:  Atrial fibrillation with RVR [I48.91] 12/29/2019 Yes    Elevated troponin [R79.89] 12/29/2019 Yes    Hyperglycemia [R73.9] 12/29/2019 Yes    Newly diagnosed diabetes [E11.9] 12/29/2019 Yes    Constipation [K59.00] 12/29/2019 Yes    Acute congestive heart failure [I50.9] 12/29/2019 Yes    BRI (obstructive sleep apnea) [G47.33] 12/29/2019 Yes    NSTEMI (non-ST elevated myocardial infarction) [I21.4] 12/29/2019 Yes    Tobacco abuse [Z72.0] 12/29/2019 Yes    Essential hypertension, benign [I10] 09/18/2012 Yes     Chronic      Problems Resolved During this Admission:       VTE Risk Mitigation (From admission, onward)         Ordered     apixaban tablet 5 mg  2 times daily      12/31/19 1650                Micaela Chow NP  Cardiology  [facility]

## 2020-01-02 NOTE — PHYSICIAN QUERY
PT Name: Jarret Jsesica  MR #: [id number]     Physician Query Form - Documentation Clarification      CDS/: Cynthia Alvarez RN, CCDS               Contact information:  [phone]    This form is a permanent document in the medical record.     Query Date: January 2, 2020    By submitting this query, we are merely seeking further clarification of documentation. Please utilize your independent clinical judgment when addressing the question(s) below.    The Medical record reflects the following:    Supporting Clinical Findings Location in Medical Record   Elevated troponin.  Etiology?  Non-STEMI with subendocardial ischemia versus secondary to congestive heart failure.    New A-fib with RVR  Complicated by new, acute CHF  Complicated by Elevated Troponin with suspected NSTEMI vs demand ischemia    Elevated troponin.  Etiology?  Non-STEMI with subendocardial ischemia versus secondary to congestive heart failure.    S/p LHC with normal coronaries.     Positive troponins secondary  demand ischemia s/p LHC negative   Active diagnoses:  NSTEMI (non-ST elevated myocardial infarction)  12/30/19 Cardiology consult      12/29/19 H & P        12/30/19 Cardiology note      12/30/19 Brief op note    12/31/19 Hospital Medicine     Lab 12/29/19  0225 12/29/19  0534 12/29/19  0739   TROPONINI 0.43 0.40 0.392*     Lab 12/29/19  0739 12/29/19  1233 12/30/19  0413   TROPONINI 0.392* 0.373* 0.345*      12/30/19 Cardiology consult        12/30/19 Cardiology note                                                                            Doctor, Please specify diagnosis or diagnoses associated with above clinical findings.  >>> Demand ischemia with myocardial infarction  >>> Demand ischemia without myocardial infarction  >>> NSTEMI (non-ST elevated myocardial infarction)   >>> Non-STEMI with subendocardial ischemia  >>> Other (specify)    Provider Use Only      demand ischemia                                                                                                             [  ] Clinically Undetermined

## 2020-01-02 NOTE — HOSPITAL COURSE
Patient was admitted with new onset AFib with RVR.  It was complicated with acute heart failure and then needed IV Lasix drip.  Because of significant elevation of troponins and angiogram was done but negative catheterization.  And possible troponin elevation from atrial fibrillation.  Multiple medications are added with beta-blocker, ACE-inhibitor and digoxin and  patient rate was controlled but still was having in state of atrial fibrillation.  Echocardiogram with ejection fraction only 25% with global hypokinesis.  Patient has good diuresis and later discharged with p.o. Lasix ACE inhibitor, aldosterone antagonist and Eliquis.  Case management was consulted to to help with Eliquis since he have no insurance and he was given coupons and samples.  Cardiology appointment was given to see 2 weeks later and possible CHARLES cardioversion as outpatient after adequate anticoagulation.  Digoxin was later discontinued because of bradycardia

## 2020-01-02 NOTE — SUBJECTIVE & OBJECTIVE
Interval History:     Review of Systems  Objective:     Vital Signs (Most Recent):  Temp: 98.6 °F (37 °C) (01/02/20 1500)  Pulse: 85 (01/02/20 1500)  Resp: 18 (01/02/20 1500)  BP: (!) 132/96 (01/02/20 1500)  SpO2: 100 % (01/02/20 1500) Vital Signs (24h Range):  Temp:  [97.8 °F (36.6 °C)-98.7 °F (37.1 °C)] 98.6 °F (37 °C)  Pulse:  [75-85] 85  Resp:  [12-26] 18  SpO2:  [93 %-100 %] 100 %  BP: (132-168)/(84-97) 132/96     Weight: 117.8 kg (259 lb 11.2 oz)  Body mass index is 36.22 kg/m².    Intake/Output Summary (Last 24 hours) at 1/2/2020 1551  Last data filed at 1/2/2020 1327  Gross per 24 hour   Intake 690 ml   Output --   Net 690 ml      Physical Exam   Constitutional: He is oriented to person, place, and time. He appears well-developed. No distress.   HENT:   Head: Normocephalic.   Eyes: Pupils are equal, round, and reactive to light. EOM are normal.   Neck: Neck supple.   Cardiovascular: Normal rate and regular rhythm.   Pulmonary/Chest: No respiratory distress.   Abdominal: He exhibits no distension. There is no tenderness.   Neurological: He is alert and oriented to person, place, and time.   Skin: No rash noted.       Significant Labs:   CBC:   Recent Labs   Lab 01/01/20  0316 01/02/20  0402   WBC 4.99 5.43   HGB 12.8* 12.1*   HCT 40.0 38.0*    159     CMP:   Recent Labs   Lab 01/01/20  0316 01/02/20  0402    138   K 3.9 3.9    102   CO2 28 27   * 252*   BUN 20 21*   CREATININE 1.2 1.4   CALCIUM 8.9 8.8   ANIONGAP 8 9   EGFRNONAA >60.0 55.8*     Cardiac Markers: No results for input(s): CKMB, MYOGLOBIN, BNP, TROPISTAT in the last 48 hours.    Significant Imaging: I have reviewed all pertinent imaging results/findings within the past 24 hours.

## 2020-01-02 NOTE — PLAN OF CARE
01/02/20 1632   Discharge Reassessment   Assessment Type Discharge Planning Reassessment   I requested the order for the glucometer from Dr Moran. The order was faxed to [facility]. I spoke with Gabriela IRELAND informed her that the order was faxed but the patient cannot pay them until he is dc'd. DC is still pending. Original script is on the chart.

## 2020-01-02 NOTE — PLAN OF CARE
Plan of care reviewed with the patient. Cardiac, vital signs, and lab monitoring.  Increase activity as tolerated. Accuchecks per order. Strict I&O. Daily weights.

## 2020-01-02 NOTE — PROGRESS NOTES
[facility] Medicine  Progress Note    Patient Name: Jarret Jessica  MRN: [id number]  Patient Class: IP- Inpatient   Admission Date: 12/29/2019  Length of Stay: 4 days  Attending Physician: Deep Moran MD  Primary Care Provider: Primary Doctor No        Subjective:     Principal Problem:Atrial fibrillation with RVR    Interval History:   No chest pain or shortness of breath  Heart rate controlled around 80s, lungs is clear, no leg swelling        Review of Systems  Objective:     Vital Signs (Most Recent):  Temp: 98.6 °F (37 °C) (01/02/20 1500)  Pulse: 85 (01/02/20 1500)  Resp: 18 (01/02/20 1500)  BP: (!) 132/96 (01/02/20 1500)  SpO2: 100 % (01/02/20 1500) Vital Signs (24h Range):  Temp:  [97.8 °F (36.6 °C)-98.7 °F (37.1 °C)] 98.6 °F (37 °C)  Pulse:  [75-85] 85  Resp:  [12-26] 18  SpO2:  [93 %-100 %] 100 %  BP: (132-168)/(84-97) 132/96     Weight: 117.8 kg (259 lb 11.2 oz)  Body mass index is 36.22 kg/m².    Intake/Output Summary (Last 24 hours) at 1/2/2020 1552  Last data filed at 1/2/2020 1327  Gross per 24 hour   Intake 690 ml   Output --   Net 690 ml      Physical Exam  Physical Exam:  General- Patient alert and oriented x3 in NAD  HEENT- PERRLA, EOMI, OP clear, MMM  Neck- No JVD, Lymphadenopathy, Thyromegaly  CV- irregular  rate and rhythm, No Murmur/tobi/rubs  Resp-clear lungs   GI- Non tender/non-distended, BS normoactive x4 quads,  Extrem- No cyanosis, clubbing, edema. Pulses 2+ and symmetric  Neuro- awake and alert , no weakness  Skin-  No masses, rashes or lesions noted on cursory skin exam.    Overview/Hospital Course:     Significant Labs:   CBC:   Recent Labs   Lab 01/01/20  0316 01/02/20  0402   WBC 4.99 5.43   HGB 12.8* 12.1*   HCT 40.0 38.0*    159     CMP:   Recent Labs   Lab 01/01/20  0316 01/02/20  0402    138   K 3.9 3.9    102   CO2 28 27   * 252*   BUN 20 21*   CREATININE 1.2 1.4   CALCIUM 8.9 8.8   ANIONGAP 8 9   EGFRNONAA >60.0 55.8*        Significant Imaging: I have reviewed all pertinent imaging results/findings within the past 24 hours.    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  Follow cardiology and possible DUB cardioversion as outpatient         Active Diagnoses:    Diagnosis Date Noted POA    PRINCIPAL PROBLEM:  Atrial fibrillation with RVR [I48.91] 12/29/2019 Yes    Elevated troponin [R79.89] 12/29/2019 Yes    Hyperglycemia [R73.9] 12/29/2019 Yes    Newly diagnosed diabetes [E11.9] 12/29/2019 Yes    Constipation [K59.00] 12/29/2019 Yes    Acute congestive heart failure [I50.9] 12/29/2019 Yes    BRI (obstructive sleep apnea) [G47.33] 12/29/2019 Yes    NSTEMI (non-ST elevated myocardial infarction) [I21.4] 12/29/2019 Yes    Tobacco abuse [Z72.0] 12/29/2019 Yes    Essential hypertension, benign [I10] 09/18/2012 Yes     Chronic      Problems Resolved During this Admission:     VTE Risk Mitigation (From admission, onward)         Ordered     apixaban tablet 5 mg  2 times daily      12/31/19 1650                   Deep Moran MD  Department of Hospital Medicine   [facility]

## 2020-01-02 NOTE — CARE UPDATE
01/01/20 2200   Patient Assessment/Suction   Level of Consciousness (AVPU) alert   Respiratory Effort Unlabored   Expansion/Accessory Muscles/Retractions no retractions   All Lung Fields Breath Sounds aeration increased   Rhythm/Pattern, Respiratory no shortness of breath reported   Cough Frequency no cough   PRE-TX-O2   O2 Device (Oxygen Therapy) room air   $ Is the patient on Low Flow Oxygen? Yes   SpO2 95 %   Pulse Oximetry Type Continuous   $ Pulse Oximetry - Multiple Charge Pulse Oximetry - Multiple   Pulse 75   Resp (!) 24   Positioning   Head of Bed (HOB) HOB at 60-90 degrees

## 2020-01-03 VITALS
RESPIRATION RATE: 20 BRPM | WEIGHT: 259.06 LBS | HEIGHT: 71 IN | TEMPERATURE: 98 F | SYSTOLIC BLOOD PRESSURE: 154 MMHG | HEART RATE: 67 BPM | DIASTOLIC BLOOD PRESSURE: 86 MMHG | OXYGEN SATURATION: 97 % | BODY MASS INDEX: 36.27 KG/M2

## 2020-01-03 LAB
GLUCOSE SERPL-MCNC: 178 MG/DL (ref 70–110)
GLUCOSE SERPL-MCNC: 225 MG/DL (ref 70–110)

## 2020-01-03 PROCEDURE — 25000003 PHARM REV CODE 250: Performed by: INTERNAL MEDICINE

## 2020-01-03 PROCEDURE — 82962 GLUCOSE BLOOD TEST: CPT

## 2020-01-03 PROCEDURE — 25000003 PHARM REV CODE 250: Performed by: NURSE PRACTITIONER

## 2020-01-03 RX ORDER — METOPROLOL SUCCINATE 50 MG/1
50 TABLET, EXTENDED RELEASE ORAL 2 TIMES DAILY
Qty: 60 TABLET | Refills: 2 | Status: ON HOLD | OUTPATIENT
Start: 2020-01-03 | End: 2020-02-14 | Stop reason: SDUPTHER

## 2020-01-03 RX ORDER — METOPROLOL SUCCINATE 50 MG/1
50 TABLET, EXTENDED RELEASE ORAL 2 TIMES DAILY
Qty: 60 TABLET | Refills: 2 | Status: SHIPPED | OUTPATIENT
Start: 2020-01-03 | End: 2020-01-03

## 2020-01-03 RX ORDER — CLONIDINE HYDROCHLORIDE 0.1 MG/1
0.1 TABLET ORAL EVERY 6 HOURS PRN
Qty: 30 TABLET | Refills: 2 | Status: ON HOLD | OUTPATIENT
Start: 2020-01-03 | End: 2020-02-14 | Stop reason: SDUPTHER

## 2020-01-03 RX ORDER — LISINOPRIL 20 MG/1
20 TABLET ORAL 2 TIMES DAILY
Qty: 30 TABLET | Refills: 2 | Status: SHIPPED | OUTPATIENT
Start: 2020-01-03 | End: 2020-01-03

## 2020-01-03 RX ORDER — FUROSEMIDE 40 MG/1
40 TABLET ORAL DAILY
Qty: 30 TABLET | Refills: 2 | Status: SHIPPED | OUTPATIENT
Start: 2020-01-04 | End: 2020-01-01 | Stop reason: SDUPTHER

## 2020-01-03 RX ORDER — EPLERENONE 25 MG/1
25 TABLET, FILM COATED ORAL DAILY
Qty: 30 TABLET | Refills: 2 | Status: SHIPPED | OUTPATIENT
Start: 2020-01-04 | End: 2021-01-01

## 2020-01-03 RX ORDER — EPLERENONE 25 MG/1
25 TABLET, FILM COATED ORAL DAILY
Qty: 30 TABLET | Refills: 2 | Status: SHIPPED | OUTPATIENT
Start: 2020-01-04 | End: 2020-01-03

## 2020-01-03 RX ORDER — FUROSEMIDE 40 MG/1
40 TABLET ORAL DAILY
Qty: 30 TABLET | Refills: 2 | Status: SHIPPED | OUTPATIENT
Start: 2020-01-04 | End: 2020-01-03

## 2020-01-03 RX ORDER — LANOLIN ALCOHOL/MO/W.PET/CERES
800 CREAM (GRAM) TOPICAL
Qty: 30 TABLET | Refills: 2 | COMMUNITY
Start: 2020-01-03 | End: 2020-02-12 | Stop reason: ALTCHOICE

## 2020-01-03 RX ORDER — CLONIDINE HYDROCHLORIDE 0.1 MG/1
0.1 TABLET ORAL EVERY 6 HOURS PRN
Qty: 30 TABLET | Refills: 2 | Status: SHIPPED | OUTPATIENT
Start: 2020-01-03 | End: 2020-01-03

## 2020-01-03 RX ORDER — LISINOPRIL 20 MG/1
20 TABLET ORAL 2 TIMES DAILY
Qty: 30 TABLET | Refills: 2 | Status: SHIPPED | OUTPATIENT
Start: 2020-01-03 | End: 2020-01-01 | Stop reason: SDUPTHER

## 2020-01-03 RX ORDER — LANOLIN ALCOHOL/MO/W.PET/CERES
800 CREAM (GRAM) TOPICAL
Qty: 30 TABLET | Refills: 2 | COMMUNITY
Start: 2020-01-03 | End: 2020-01-03

## 2020-01-03 RX ADMIN — LISINOPRIL 20 MG: 20 TABLET ORAL at 08:01

## 2020-01-03 RX ADMIN — DIGOXIN 0.25 MG: 250 TABLET ORAL at 08:01

## 2020-01-03 RX ADMIN — METOPROLOL SUCCINATE 50 MG: 50 TABLET, FILM COATED, EXTENDED RELEASE ORAL at 08:01

## 2020-01-03 RX ADMIN — PANTOPRAZOLE SODIUM 40 MG: 40 TABLET, DELAYED RELEASE ORAL at 08:01

## 2020-01-03 RX ADMIN — EPLERENONE 25 MG: 25 TABLET, FILM COATED ORAL at 08:01

## 2020-01-03 RX ADMIN — FUROSEMIDE 40 MG: 40 TABLET ORAL at 08:01

## 2020-01-03 RX ADMIN — APIXABAN 5 MG: 5 TABLET, FILM COATED ORAL at 08:01

## 2020-01-03 NOTE — PLAN OF CARE
01/03/20 1005   Discharge Reassessment   Assessment Type Discharge Planning Reassessment   Anticipated Discharge Disposition Home     SW received consult for this pt to help him afford his Eliquis. GREGORY provided pt with a $10 co-pay card. SW to continue to f/u for d/c planning.    Update 1240: GREGORY informed nurse, Kera, that the pt has a glucometer that can be delivered by the [facility] in the [facility] to the pt's bedside once his money arrives. Kera acknowledged. No further CM d/c needs.

## 2020-01-03 NOTE — CARE UPDATE
Readmission Prevention      DX: NSTEMI, CHF_TOC dx. Pt is uninsured.  Pt needs a PCP, PC will make him a PCP appointment upon discharge with [facility] per his choice.  Pt has been explained it is a [facility] and how those clinic operate.  Pt has had uncontrolled BG, however, staff have arranged for pt to receive a glucometer.  Per JORGE Falcon, pt was referred for ms [place] application and business office will send financial assistance application.  Pt 's mother provides support to patient.  PC discussed needs and will also send 2 pharmacy coupons to patient that are available for medication assistance along with patient assistance forms to pt for Eliquis.  Pt also referred to Dr. Sims, [facility] pharmacist.      Pt received articles on MI, AHA HF and NSTEMIs, a MS CS &R list, ACCESS health info and HCR resources list all for additional resources.  Pt has contributory behaviors documented ( hx tobacco and marijuana use) and must follow medication regime or will be @ a high risk for readmissions.      Steffany MARIE, FRANK    Transitions of Care Program  1/3/2020, 1:54 pm    Follow up x1  PC called pt, line continuously busy x2. ([phone]). PC then called [facility] to see if pt set up/presented to PCP appt.  Pt did recently set up appt. then cancelled. Pt agreed to [facility] on encounter-see note on 1/3/2020.  Pt lives in [place], so pt could have decided to go to [facility]; office staff stated they are NOT part of that [facility] system, so she is unable to verify if patient went there.  PC to follow up.    Steffany MARIE, FRANK    Transitions of Care Program  1/17/2020, 12:07 pm

## 2020-01-03 NOTE — PROGRESS NOTES
[facility]  Cardiology  Progress Note    Patient Name: Jarret Jessica  MRN: [id number]  Admission Date: 12/29/2019  Hospital Length of Stay: 5 days  Code Status: Full Code   Attending Physician: Deep Moran MD   Primary Care Physician: Primary Doctor No  Expected Discharge Date:   Principal Problem:Atrial fibrillation with RVR    Subjective:     Interval History:  Denies chest pain or shortness of breath  HR remains atrial fibrillation in the 80s, with PVCs. This morning while napping HR dropped in the 40s  Patient states he is ready to go home  Blood pressure remains on the high side.     ROS   No significant headaches or sore throat or runny nose.   No recent changes in vision.   No recent changes in hearing.  No dysphagia or odynophagia.  Denies chest pain and shortness of breath at baseline.   Denies any cough or hemoptysis.   Denies any abdominal pain, nausea, vomiting, diarrhea or constipation.   Denies any dysuria or polyuria.   Denies any fevers or chills.   Denies any recent significant weight changes.   Denies bleeding diathesis    Objective:     Vital Signs (Most Recent):  Temp: 98.2 °F (36.8 °C) (01/03/20 0735)  Pulse: 67 (01/03/20 0735)  Resp: 20 (01/03/20 0735)  BP: (!) 154/86 (01/03/20 0735)  SpO2: 97 % (01/03/20 0735) Vital Signs (24h Range):  Temp:  [97.8 °F (36.6 °C)-98.6 °F (37 °C)] 98.2 °F (36.8 °C)  Pulse:  [58-93] 67  Resp:  [16-20] 20  SpO2:  [91 %-100 %] 97 %  BP: (127-178)/() 154/86     Weight: 117.5 kg (259 lb 0.7 oz)  Body mass index is 36.13 kg/m².    SpO2: 97 %  O2 Device (Oxygen Therapy): room air      Intake/Output Summary (Last 24 hours) at 1/3/2020 1009  Last data filed at 1/2/2020 1327  Gross per 24 hour   Intake 240 ml   Output --   Net 240 ml       Lines/Drains/Airways     Peripheral Intravenous Line                 Peripheral IV - Single Lumen 12/29/19 0320 18 G Right Forearm 5 days                Physical Exam   HEENT: Normocephalic, atraumatic, PERRL,  Conjunctiva pink, no scleral icterus.   CVS: S1S2+, Irregular, +SM, no rubs or gallops, JVP: Normal.  LUNGS: Clear  ABDOMEN: Soft, NT, BS+  EXTREMITIES: No cyanosis, clubbing or edema  NEURO: AAO X 3.   Right wrist: CDI       Significant Labs:   BMP:   Recent Labs   Lab 01/02/20  0402   *      K 3.9      CO2 27   BUN 21*   CREATININE 1.4   CALCIUM 8.8   MG 2.0       Significant Imaging:   Assessment and Plan:  Acute Systolic CHF  Hypertension  Persistent Atrial fibrillation  Plan  Discussed with patient about blood pressure monitoring at home. States he can monitor it and bring a log into the office.  Will stop digoxin in view of bradycardia  Will discuss sleep study as an outpatient  Patient is stable for discharge from our standpoint and can follow up with us in 1 or 2 weeks in the clinic with Dr. Person.        Brief HPI:     Active Diagnoses:    Diagnosis Date Noted POA    PRINCIPAL PROBLEM:  Atrial fibrillation with RVR [I48.91] 12/29/2019 Yes    Elevated troponin [R79.89] 12/29/2019 Yes    Hyperglycemia [R73.9] 12/29/2019 Yes    Newly diagnosed diabetes [E11.9] 12/29/2019 Yes    Constipation [K59.00] 12/29/2019 Yes    Acute congestive heart failure [I50.9] 12/29/2019 Yes    BRI (obstructive sleep apnea) [G47.33] 12/29/2019 Yes    NSTEMI (non-ST elevated myocardial infarction) [I21.4] 12/29/2019 Yes    Tobacco abuse [Z72.0] 12/29/2019 Yes    Essential hypertension, benign [I10] 09/18/2012 Yes     Chronic      Problems Resolved During this Admission:       VTE Risk Mitigation (From admission, onward)         Ordered     apixaban tablet 5 mg  2 times daily      12/31/19 1650                Micaela Chow NP  Cardiology  [facility]

## 2020-01-03 NOTE — DISCHARGE SUMMARY
formerly [facility] Medicine  Discharge Summary      Patient Name: Jarret Jessica  MRN: [id number]  Admission Date: 12/29/2019  Hospital Length of Stay: 5 days  Discharge Date and Time:  01/03/2020 5:35 PM  Attending Physician: No att. providers found   Discharging Provider: Deep Moran MD  Primary Care Provider: Primary Doctor Lolis      HPI:     53 yo M with PMH of HTN and non-compliance with medical therapy presented with CP.  Transferred from [place] for new a-fib with RVR  Pt has been experiencing SOB for approx a week  At first it was SOB with productive cough, rhinorrhea, muscle aches, nausea and vomiting  He took OTC flu medication  However those symptoms have resolved; only the SOB remains  Last night before going to ED, he began having L-sided CP with radiation up to his neck  Described as pressure, constant, no aggravating or alleviating factors  Smokes 1 PPD  Reports palpitations  No HA, dizziness, no new n/v  Is unaware if he has been experiencing edema  No known hx CAD, HF, a-fib, but pt doesn't follow with physicians regularly   At [place], pt was transferred after receiving lovenox; pt sent with nitro drip and a-fib was uncontrolled on arrival  Pt started on diltiazem drip in our ED; nitro drip discontinued  Pt also found to have glucose >500 in OSH; insulin drip started there and continued here    Procedure(s) (LRB):  Angiogram, Coronary, with Left Heart Cath (N/A)      Hospital Course:     Patient was admitted with new onset AFib with RVR.  It was complicated with acute heart failure and then needed IV Lasix drip.  Because of significant elevation of troponins and angiogram was done but negative catheterization.  And possible troponin elevation from atrial fibrillation.  Multiple medications are added with beta-blocker, ACE-inhibitor and digoxin and  patient rate was controlled but still was having in state of atrial fibrillation.  Echocardiogram with ejection fraction only 25% with  global hypokinesis.  Patient has good diuresis and later discharged with p.o. Lasix ACE inhibitor, aldosterone antagonist and Eliquis.  Case management was consulted to to help with Eliquis since he have no insurance and he was given coupons and samples.  Cardiology appointment was given to see 2 weeks later and possible CHARLES cardioversion as outpatient after adequate anticoagulation.  Digoxin was later discontinued because of bradycardia       Consults:   Consults (From admission, onward)        Status Ordering Provider     Inpatient consult to Cardiology  Once     Provider:  Gasper Person MD    Completed TOREY MADRIGAL     Inpatient consult to   Once     Provider:  (Not yet assigned)    Completed DL ARGUELLES     IP consult to case management  Once     Provider:  (Not yet assigned)    Completed GASPER PERSON new Assessment & Plan notes have been filed under this hospital service since the last note was generated.  Service: Hospital Medicine    Final Active Diagnoses:    Diagnosis Date Noted POA    PRINCIPAL PROBLEM:  Atrial fibrillation with RVR [I48.91] 12/29/2019 Yes    Elevated troponin [R79.89] 12/29/2019 Yes    Hyperglycemia [R73.9] 12/29/2019 Yes    Newly diagnosed diabetes [E11.9] 12/29/2019 Yes    Constipation [K59.00] 12/29/2019 Yes    Acute congestive heart failure [I50.9] 12/29/2019 Yes    BRI (obstructive sleep apnea) [G47.33] 12/29/2019 Yes    NSTEMI (non-ST elevated myocardial infarction) [I21.4] 12/29/2019 Yes    Tobacco abuse [Z72.0] 12/29/2019 Yes    Essential hypertension, benign [I10] 09/18/2012 Yes     Chronic      Problems Resolved During this Admission:       Discharged Condition: good    Disposition: Home or Self Care    Follow Up:  Follow-up Information     Primary Doctor No In 1 week.           Gasper Person MD In 2 weeks.    Specialties:  Cardiovascular Disease, Cardiology  Contact information:  [address] [place] [address]  [address]  [place] [address]  [phone]                 Patient Instructions:      Diet Cardiac     Diet diabetic     Notify your health care provider if you experience any of the following:  severe uncontrolled pain     Activity as tolerated       Significant Diagnostic Studies: Labs:   CMP   Recent Labs   Lab 01/02/20  0402      K 3.9      CO2 27   *   BUN 21*   CREATININE 1.4   CALCIUM 8.8   ANIONGAP 9   ESTGFRAFRICA >60.0   EGFRNONAA 55.8*    and CBC   Recent Labs   Lab 01/02/20  0402   WBC 5.43   HGB 12.1*   HCT 38.0*          Pending Diagnostic Studies:     Procedure Component Value Units Date/Time    Echo Color Flow Doppler? Yes [[id number]]     Order Status:  Sent Lab Status:  No result          Medications:  Reconciled Home Medications:      Medication List      START taking these medications    apixaban 5 mg Tab  Commonly known as:  ELIQUIS  Take 1 tablet (5 mg total) by mouth 2 (two) times daily.     BD Alcohol Swabs Padm  Generic drug:  alcohol swabs  Use as directed     cloNIDine 0.1 MG tablet  Commonly known as:  CATAPRES  Take 1 tablet (0.1 mg total) by mouth every 6 (six) hours as needed (180).     eplerenone 25 MG Tab  Commonly known as:  INSPRA  Take 1 tablet (25 mg total) by mouth once daily.  Start taking on:  January 4, 2020     furosemide 40 MG tablet  Commonly known as:  LASIX  Take 1 tablet (40 mg total) by mouth once daily.  Start taking on:  January 4, 2020     insulin detemir U-100 100 unit/mL (3 mL) Inpn pen  Commonly known as:  LEVEMIR FLEXTOUCH  Inject 10 Units into the skin every evening.     lisinopril 20 MG tablet  Commonly known as:  PRINIVIL,ZESTRIL  Take 1 tablet (20 mg total) by mouth 2 (two) times daily.     magnesium oxide 400 mg (241.3 mg magnesium) tablet  Commonly known as:  MAG-OX  Take 2 tablets (800 mg total) by mouth as needed (if magnesium level is 1.5-1.8 mg/dL give 800 mg every 4 hours x 2 doses).     metoprolol succinate 50 MG 24 hr  tablet  Commonly known as:  TOPROL-XL  Take 1 tablet (50 mg total) by mouth 2 (two) times daily.     True Metrix Glucose Meter Misc  Generic drug:  blood-glucose meter  use as directed     True Metrix Glucose Test Strip Strp  Generic drug:  blood sugar diagnostic  use as directed to test blood sugar     TRUEplus Lancets 30 gauge Misc  Generic drug:  lancets  Use as directed to test blood sugar        STOP taking these medications    MARILEE-SELTZER PLUS COLD/COUGH ORAL     NYQUIL ORAL            Indwelling Lines/Drains at time of discharge:   Lines/Drains/Airways     None               Physical Exam:  General- Patient alert and oriented x3 in NAD  HEENT- PERRLA, EOMI, OP clear, MMM  Neck- No JVD, Lymphadenopathy, Thyromegaly  CV- Regular rate and rhythm, No Murmur/tobi/rubs  Resp- Lungs CTA Bilaterally, No increased WOB  GI- Non tender/non-distended, BS normoactive x4 quads, no HSM  Extrem- No cyanosis, clubbing, edema.   Neuro- Strength 5/5 flexors/extensors,   Skin-  No masses, rashes or lesions noted on cursory skin exam.  Time spent on the discharge of patient: 34 minutes  Patient was seen and examined on the date of discharge and determined to be suitable for discharge.         Deep Moran MD  Department of Hospital Medicine  [facility]

## 2020-01-03 NOTE — NURSING
Discharge instructions given to patient. Patient verbalized understanding of new medications and follow up appointments. Patient received glucometer from [facility] prior to discharge. IV removed without difficulty, catheter tip intact. Tele removed. Patient discharged home via private vehicle.

## 2020-02-12 ENCOUNTER — HOSPITAL ENCOUNTER (INPATIENT)
Facility: HOSPITAL | Age: 55
LOS: 2 days | Discharge: HOME | DRG: 292 | End: 2020-02-14
Attending: EMERGENCY MEDICINE | Admitting: INTERNAL MEDICINE

## 2020-02-12 DIAGNOSIS — R06.02 SOB (SHORTNESS OF BREATH): ICD-10-CM

## 2020-02-12 DIAGNOSIS — R07.9 CHEST PAIN: ICD-10-CM

## 2020-02-12 DIAGNOSIS — I21.4 NON-ST ELEVATION MYOCARDIAL INFARCTION (NSTEMI): ICD-10-CM

## 2020-02-12 DIAGNOSIS — I48.91 ATRIAL FIBRILLATION: ICD-10-CM

## 2020-02-12 DIAGNOSIS — I48.19 OTHER PERSISTENT ATRIAL FIBRILLATION: ICD-10-CM

## 2020-02-12 DIAGNOSIS — G47.33 OSA (OBSTRUCTIVE SLEEP APNEA): ICD-10-CM

## 2020-02-12 DIAGNOSIS — I50.20 SYSTOLIC CONGESTIVE HEART FAILURE, UNSPECIFIED HF CHRONICITY: Primary | ICD-10-CM

## 2020-02-12 DIAGNOSIS — E11.00 TYPE 2 DIABETES MELLITUS WITH HYPEROSMOLARITY WITHOUT COMA, UNSPECIFIED WHETHER LONG TERM INSULIN USE: Chronic | ICD-10-CM

## 2020-02-12 DIAGNOSIS — R06.02 SHORTNESS OF BREATH: ICD-10-CM

## 2020-02-12 DIAGNOSIS — I10 ESSENTIAL HYPERTENSION: ICD-10-CM

## 2020-02-12 DIAGNOSIS — I48.91 ATRIAL FIBRILLATION WITH RAPID VENTRICULAR RESPONSE: ICD-10-CM

## 2020-02-12 PROBLEM — E66.9 CLASS 2 OBESITY IN ADULT: Chronic | Status: ACTIVE | Noted: 2020-02-12

## 2020-02-12 PROBLEM — I50.23 ACUTE ON CHRONIC SYSTOLIC CONGESTIVE HEART FAILURE: Status: ACTIVE | Noted: 2020-02-12

## 2020-02-12 PROBLEM — I42.9 CARDIOMYOPATHY: Chronic | Status: ACTIVE | Noted: 2020-02-12

## 2020-02-12 PROBLEM — Z91.199 NONCOMPLIANCE: Chronic | Status: ACTIVE | Noted: 2020-02-12

## 2020-02-12 PROBLEM — N17.9 AKI (ACUTE KIDNEY INJURY): Status: ACTIVE | Noted: 2020-02-12

## 2020-02-12 PROBLEM — E11.9 TYPE 2 DIABETES MELLITUS: Chronic | Status: ACTIVE | Noted: 2020-02-12

## 2020-02-12 LAB
ALBUMIN SERPL BCP-MCNC: 3.3 G/DL (ref 3.5–5.2)
ALP SERPL-CCNC: 46 U/L (ref 55–135)
ALT SERPL W/O P-5'-P-CCNC: 33 U/L (ref 10–44)
AMPHET+METHAMPHET UR QL: NEGATIVE
ANION GAP SERPL CALC-SCNC: 10 MMOL/L (ref 8–16)
AST SERPL-CCNC: 22 U/L (ref 10–40)
BARBITURATES UR QL SCN>200 NG/ML: NEGATIVE
BASOPHILS # BLD AUTO: 0.02 K/UL (ref 0–0.2)
BASOPHILS NFR BLD: 0.4 % (ref 0–1.9)
BENZODIAZ UR QL SCN>200 NG/ML: NEGATIVE
BILIRUB SERPL-MCNC: 1 MG/DL (ref 0.1–1)
BNP SERPL-MCNC: 1289 PG/ML (ref 0–99)
BUN SERPL-MCNC: 17 MG/DL (ref 6–20)
BZE UR QL SCN: NEGATIVE
CALCIUM SERPL-MCNC: 8.8 MG/DL (ref 8.7–10.5)
CANNABINOIDS UR QL SCN: NEGATIVE
CHLORIDE SERPL-SCNC: 104 MMOL/L (ref 95–110)
CO2 SERPL-SCNC: 26 MMOL/L (ref 23–29)
CREAT SERPL-MCNC: 1.3 MG/DL (ref 0.5–1.4)
CREAT UR-MCNC: 87 MG/DL (ref 23–375)
DIFFERENTIAL METHOD: ABNORMAL
EOSINOPHIL # BLD AUTO: 0 K/UL (ref 0–0.5)
EOSINOPHIL NFR BLD: 0.2 % (ref 0–8)
ERYTHROCYTE [DISTWIDTH] IN BLOOD BY AUTOMATED COUNT: 14.9 % (ref 11.5–14.5)
EST. GFR  (AFRICAN AMERICAN): >60 ML/MIN/1.73 M^2
EST. GFR  (NON AFRICAN AMERICAN): >60 ML/MIN/1.73 M^2
GLUCOSE SERPL-MCNC: 155 MG/DL (ref 70–110)
GLUCOSE SERPL-MCNC: 159 MG/DL (ref 70–110)
GLUCOSE SERPL-MCNC: 168 MG/DL (ref 70–110)
GLUCOSE SERPL-MCNC: 174 MG/DL (ref 70–110)
GLUCOSE SERPL-MCNC: 182 MG/DL (ref 70–110)
HCT VFR BLD AUTO: 42.3 % (ref 40–54)
HGB BLD-MCNC: 13.3 G/DL (ref 14–18)
IMM GRANULOCYTES # BLD AUTO: 0.02 K/UL (ref 0–0.04)
IMM GRANULOCYTES NFR BLD AUTO: 0.4 % (ref 0–0.5)
INR PPP: 1.2
LYMPHOCYTES # BLD AUTO: 1 K/UL (ref 1–4.8)
LYMPHOCYTES NFR BLD: 17.8 % (ref 18–48)
MAGNESIUM SERPL-MCNC: 1.9 MG/DL (ref 1.6–2.6)
MCH RBC QN AUTO: 26.1 PG (ref 27–31)
MCHC RBC AUTO-ENTMCNC: 31.4 G/DL (ref 32–36)
MCV RBC AUTO: 83 FL (ref 82–98)
MONOCYTES # BLD AUTO: 0.4 K/UL (ref 0.3–1)
MONOCYTES NFR BLD: 7.5 % (ref 4–15)
NEUTROPHILS # BLD AUTO: 3.9 K/UL (ref 1.8–7.7)
NEUTROPHILS NFR BLD: 73.7 % (ref 38–73)
NRBC BLD-RTO: 0 /100 WBC
OPIATES UR QL SCN: NEGATIVE
PCP UR QL SCN>25 NG/ML: NEGATIVE
PLATELET # BLD AUTO: 194 K/UL (ref 150–350)
PMV BLD AUTO: 13.1 FL (ref 9.2–12.9)
POTASSIUM SERPL-SCNC: 4.1 MMOL/L (ref 3.5–5.1)
PROT SERPL-MCNC: 6 G/DL (ref 6–8.4)
PROTHROMBIN TIME: 14.6 SEC (ref 10.6–14.8)
RBC # BLD AUTO: 5.1 M/UL (ref 4.6–6.2)
SODIUM SERPL-SCNC: 140 MMOL/L (ref 136–145)
TOXICOLOGY INFORMATION: NORMAL
TROPONIN I SERPL DL<=0.01 NG/ML-MCNC: 0.19 NG/ML
WBC # BLD AUTO: 5.34 K/UL (ref 3.9–12.7)

## 2020-02-12 PROCEDURE — 25000003 PHARM REV CODE 250: Performed by: INTERNAL MEDICINE

## 2020-02-12 PROCEDURE — 63600175 PHARM REV CODE 636 W HCPCS: Performed by: EMERGENCY MEDICINE

## 2020-02-12 PROCEDURE — 63600175 PHARM REV CODE 636 W HCPCS: Performed by: INTERNAL MEDICINE

## 2020-02-12 PROCEDURE — 94761 N-INVAS EAR/PLS OXIMETRY MLT: CPT

## 2020-02-12 PROCEDURE — 83880 ASSAY OF NATRIURETIC PEPTIDE: CPT

## 2020-02-12 PROCEDURE — 84484 ASSAY OF TROPONIN QUANT: CPT

## 2020-02-12 PROCEDURE — C9399 UNCLASSIFIED DRUGS OR BIOLOG: HCPCS | Performed by: INTERNAL MEDICINE

## 2020-02-12 PROCEDURE — 82962 GLUCOSE BLOOD TEST: CPT

## 2020-02-12 PROCEDURE — 93005 ELECTROCARDIOGRAM TRACING: CPT

## 2020-02-12 PROCEDURE — 27000221 HC OXYGEN, UP TO 24 HOURS

## 2020-02-12 PROCEDURE — 85610 PROTHROMBIN TIME: CPT

## 2020-02-12 PROCEDURE — 25000003 PHARM REV CODE 250: Performed by: EMERGENCY MEDICINE

## 2020-02-12 PROCEDURE — 85025 COMPLETE CBC W/AUTO DIFF WBC: CPT

## 2020-02-12 PROCEDURE — 99900035 HC TECH TIME PER 15 MIN (STAT)

## 2020-02-12 PROCEDURE — 99291 CRITICAL CARE FIRST HOUR: CPT

## 2020-02-12 PROCEDURE — 83735 ASSAY OF MAGNESIUM: CPT

## 2020-02-12 PROCEDURE — 80053 COMPREHEN METABOLIC PANEL: CPT

## 2020-02-12 PROCEDURE — 96365 THER/PROPH/DIAG IV INF INIT: CPT

## 2020-02-12 PROCEDURE — 80307 DRUG TEST PRSMV CHEM ANLYZR: CPT

## 2020-02-12 PROCEDURE — 96375 TX/PRO/DX INJ NEW DRUG ADDON: CPT

## 2020-02-12 PROCEDURE — 21000000 HC CCU ICU ROOM CHARGE

## 2020-02-12 RX ORDER — SODIUM CHLORIDE 0.9 % (FLUSH) 0.9 %
10 SYRINGE (ML) INJECTION EVERY 6 HOURS PRN
Status: DISCONTINUED | OUTPATIENT
Start: 2020-02-12 | End: 2020-02-14 | Stop reason: HOSPADM

## 2020-02-12 RX ORDER — DILTIAZEM HCL 1 MG/ML
5 INJECTION, SOLUTION INTRAVENOUS CONTINUOUS
Status: DISCONTINUED | OUTPATIENT
Start: 2020-02-12 | End: 2020-02-12

## 2020-02-12 RX ORDER — METOPROLOL SUCCINATE 25 MG/1
50 TABLET, EXTENDED RELEASE ORAL 2 TIMES DAILY
Status: DISCONTINUED | OUTPATIENT
Start: 2020-02-12 | End: 2020-02-14

## 2020-02-12 RX ORDER — FUROSEMIDE 10 MG/ML
40 INJECTION INTRAMUSCULAR; INTRAVENOUS
Status: DISCONTINUED | OUTPATIENT
Start: 2020-02-12 | End: 2020-02-13

## 2020-02-12 RX ORDER — ONDANSETRON 2 MG/ML
4 INJECTION INTRAMUSCULAR; INTRAVENOUS EVERY 8 HOURS PRN
Status: DISCONTINUED | OUTPATIENT
Start: 2020-02-12 | End: 2020-02-14 | Stop reason: HOSPADM

## 2020-02-12 RX ORDER — ZINC GLUCONATE 50 MG
500 TABLET ORAL DAILY
COMMUNITY

## 2020-02-12 RX ORDER — ASPIRIN 325 MG
325 TABLET ORAL
Status: COMPLETED | OUTPATIENT
Start: 2020-02-12 | End: 2020-02-12

## 2020-02-12 RX ORDER — EPLERENONE 25 MG/1
25 TABLET, FILM COATED ORAL DAILY
Status: DISCONTINUED | OUTPATIENT
Start: 2020-02-12 | End: 2020-02-14 | Stop reason: HOSPADM

## 2020-02-12 RX ORDER — POLYETHYLENE GLYCOL 3350 17 G/17G
17 POWDER, FOR SOLUTION ORAL DAILY
Status: DISCONTINUED | OUTPATIENT
Start: 2020-02-13 | End: 2020-02-14 | Stop reason: HOSPADM

## 2020-02-12 RX ORDER — METFORMIN HYDROCHLORIDE 500 MG/1
500 TABLET ORAL 2 TIMES DAILY
COMMUNITY
Start: 2020-01-10 | End: 2020-01-01 | Stop reason: SDUPTHER

## 2020-02-12 RX ORDER — LANOLIN ALCOHOL/MO/W.PET/CERES
400 CREAM (GRAM) TOPICAL DAILY
Status: DISCONTINUED | OUTPATIENT
Start: 2020-02-12 | End: 2020-02-14 | Stop reason: HOSPADM

## 2020-02-12 RX ORDER — ACETAMINOPHEN 325 MG/1
650 TABLET ORAL EVERY 4 HOURS PRN
Status: DISCONTINUED | OUTPATIENT
Start: 2020-02-12 | End: 2020-02-14 | Stop reason: HOSPADM

## 2020-02-12 RX ORDER — ALPRAZOLAM 0.5 MG/1
0.5 TABLET ORAL NIGHTLY PRN
Status: DISCONTINUED | OUTPATIENT
Start: 2020-02-12 | End: 2020-02-13

## 2020-02-12 RX ORDER — FUROSEMIDE 10 MG/ML
40 INJECTION INTRAMUSCULAR; INTRAVENOUS
Status: COMPLETED | OUTPATIENT
Start: 2020-02-12 | End: 2020-02-12

## 2020-02-12 RX ORDER — HYDRALAZINE HYDROCHLORIDE 20 MG/ML
10 INJECTION INTRAMUSCULAR; INTRAVENOUS
Status: DISCONTINUED | OUTPATIENT
Start: 2020-02-12 | End: 2020-02-12

## 2020-02-12 RX ORDER — DILTIAZEM HYDROCHLORIDE 5 MG/ML
10 INJECTION INTRAVENOUS
Status: COMPLETED | OUTPATIENT
Start: 2020-02-12 | End: 2020-02-12

## 2020-02-12 RX ORDER — LISINOPRIL 20 MG/1
20 TABLET ORAL 2 TIMES DAILY
Status: DISCONTINUED | OUTPATIENT
Start: 2020-02-12 | End: 2020-02-14 | Stop reason: HOSPADM

## 2020-02-12 RX ADMIN — LISINOPRIL 20 MG: 20 TABLET ORAL at 01:02

## 2020-02-12 RX ADMIN — NITROGLYCERIN 1 INCH: 20 OINTMENT TOPICAL at 10:02

## 2020-02-12 RX ADMIN — MAGNESIUM OXIDE 400 MG: 400 TABLET ORAL at 01:02

## 2020-02-12 RX ADMIN — FUROSEMIDE 40 MG: 20 INJECTION, SOLUTION INTRAMUSCULAR; INTRAVENOUS at 04:02

## 2020-02-12 RX ADMIN — FUROSEMIDE 40 MG: 10 INJECTION, SOLUTION INTRAMUSCULAR; INTRAVENOUS at 10:02

## 2020-02-12 RX ADMIN — DILTIAZEM HYDROCHLORIDE 5 MG/HR: 5 INJECTION INTRAVENOUS at 10:02

## 2020-02-12 RX ADMIN — METOPROLOL SUCCINATE 50 MG: 25 TABLET, FILM COATED, EXTENDED RELEASE ORAL at 01:02

## 2020-02-12 RX ADMIN — ASPIRIN 325 MG ORAL TABLET 325 MG: 325 PILL ORAL at 10:02

## 2020-02-12 RX ADMIN — INSULIN DETEMIR 10 UNITS: 100 INJECTION, SOLUTION SUBCUTANEOUS at 09:02

## 2020-02-12 RX ADMIN — METOPROLOL SUCCINATE 50 MG: 25 TABLET, FILM COATED, EXTENDED RELEASE ORAL at 08:02

## 2020-02-12 RX ADMIN — APIXABAN 5 MG: 5 TABLET, FILM COATED ORAL at 08:02

## 2020-02-12 RX ADMIN — AMIODARONE HYDROCHLORIDE 150 MG: 1.5 INJECTION, SOLUTION INTRAVENOUS at 01:02

## 2020-02-12 RX ADMIN — AMIODARONE HYDROCHLORIDE 0.5 MG/MIN: 1.8 INJECTION, SOLUTION INTRAVENOUS at 06:02

## 2020-02-12 RX ADMIN — DILTIAZEM HYDROCHLORIDE 10 MG: 5 INJECTION INTRAVENOUS at 10:02

## 2020-02-12 RX ADMIN — ALPRAZOLAM 0.5 MG: 0.5 TABLET ORAL at 10:02

## 2020-02-12 RX ADMIN — LISINOPRIL 20 MG: 20 TABLET ORAL at 08:02

## 2020-02-12 RX ADMIN — APIXABAN 5 MG: 5 TABLET, FILM COATED ORAL at 01:02

## 2020-02-12 RX ADMIN — EPLERENONE 25 MG: 25 TABLET, FILM COATED ORAL at 01:02

## 2020-02-12 RX ADMIN — AMIODARONE HYDROCHLORIDE 1 MG/MIN: 1.8 INJECTION, SOLUTION INTRAVENOUS at 01:02

## 2020-02-12 NOTE — ED PROVIDER NOTES
Encounter Date: 2/12/2020       History     Chief Complaint   Patient presents with    Shortness of Breath     swelling in his feet      54-year-old male presented emergency department with shortness of breath and orthopnea and paroxysmal nocturnal dyspnea with edema diffusely all over the body getting worse over the past 3-4 days.  Patient has history of atrial fibrillation and on Eliquis.  Patient also has history of congestive heart failure.  Patient denies fever chills nausea vomiting.  Patient said last 3 nights he could not sleep because he was getting short of breath every time he tries to lay down.  Had to get up in the middle of the night several times as he could not sleep.  Denies any chest pain.  Denies abdominal pain or weakness or numbness.  Patient complains of edema of both legs and also upper extremities. Also endorsing to weight gain.        Review of patient's allergies indicates:  No Known Allergies  Past Medical History:   Diagnosis Date    Diverticulitis 2010    Hypertension      Past Surgical History:   Procedure Laterality Date    ANGIOGRAM, CORONARY, WITH LEFT HEART CATHETERIZATION N/A 12/30/2019    Procedure: Angiogram, Coronary, with Left Heart Cath;  Surgeon: Gasper Person MD;  [facility]: [facility] CATH/EP LAB;  Service: Cardiology;  Laterality: N/A;     Family History   Problem Relation Age of Onset    Heart disease Father         MI at 74    Hypertension Mother      Social History     Tobacco Use    Smoking status: Current Every Day Smoker     Packs/day: 1.50     Years: 30.00     Pack years: 45.00     Types: Cigarettes    Smokeless tobacco: Never Used   Substance Use Topics    Alcohol use: Not Currently    Drug use: No     Review of Systems   Constitutional: Negative.    HENT: Negative.    Eyes: Negative.    Respiratory: Positive for cough and shortness of breath.    Cardiovascular: Positive for leg swelling. Negative for chest pain.   Gastrointestinal: Negative.     Endocrine: Negative.    Genitourinary: Negative.    Musculoskeletal: Negative.    Skin: Negative.    Allergic/Immunologic: Negative.    Neurological: Negative.    Hematological: Negative.    Psychiatric/Behavioral: Negative.    All other systems reviewed and are negative.      Physical Exam     Initial Vitals   BP Pulse Resp Temp SpO2   02/12/20 0926 02/12/20 0926 02/12/20 0926 02/12/20 0926 02/12/20 0946   (!) 188/96 (!) 123 (!) 22 98.2 °F (36.8 °C) (!) 94 %      MAP       --                Physical Exam    Nursing note and vitals reviewed.  Constitutional: He appears well-developed and well-nourished.   HENT:   Head: Normocephalic and atraumatic.   Nose: Nose normal.   Eyes: Conjunctivae and EOM are normal.   Neck: Normal range of motion. No tracheal deviation present.   Cardiovascular: Intact distal pulses. An irregularly irregular rhythm present.  Tachycardia present.  Exam reveals gallop and S3. Exam reveals no friction rub.    No murmur heard.  Pulmonary/Chest: No respiratory distress. He has no wheezes. He has rales.   Mild rales in the lung bases   Abdominal: Soft. He exhibits no distension. There is no tenderness.   Musculoskeletal: Normal range of motion. He exhibits edema. He exhibits no tenderness.   2+ edema of both lower extremities. No calf tenderness.   Neurological: He is alert and oriented to person, place, and time. He has normal strength.   Skin: Skin is warm and dry. Capillary refill takes less than 2 seconds.   Psychiatric: He has a normal mood and affect. Thought content normal.         ED Course   Critical Care  Date/Time: 2/12/2020 11:32 AM  Performed by: Felicia Benz MD  Authorized by: Felicia Benz MD   Direct patient critical care time: 25 minutes  Documentation critical care time: 5 minutes  Consult with family critical care time: 5 minutes  Total critical care time (exclusive of procedural time) : 35 minutes        Labs Reviewed   POCT GLUCOSE - Abnormal; Notable for the following  components:       Result Value    POC Glucose 155 (*)     All other components within normal limits   CBC W/ AUTO DIFFERENTIAL   COMPREHENSIVE METABOLIC PANEL   TROPONIN I   B-TYPE NATRIURETIC PEPTIDE   PROTIME-INR   MAGNESIUM     EKG Readings: (Independently Interpreted)   Initial Reading: No STEMI. Rhythm: Atrial Fibrillation. Ectopy: No Ectopy. Conduction: Normal.   Atrial fibrillation with rapid ventricular response     ECG Results          EKG 12-lead (In process)  Result time 02/12/20 09:39:06    In process by Interface, Lab In [facility] (02/12/20 09:39:06)                 Narrative:    Test Reason : R06.02,    Vent. Rate : 117 BPM     Atrial Rate : 119 BPM     P-R Int : 000 ms          QRS Dur : 102 ms      QT Int : 306 ms       P-R-T Axes : 000 023 109 degrees     QTc Int : 426 ms    Atrial fibrillation with rapid ventricular response  Septal infarct ,age undetermined  Abnormal ECG  When compared with ECG of 30-DEC-2019 06:04,  Vent. rate has increased BY  56 BPM  Nonspecific T wave abnormality now evident in Inferior leads    Referred By: [email or web address]   SELF           Confirmed By:                             Imaging Results          X-Ray Chest AP Portable (Final result)  Result time 02/12/20 10:09:14    Final result by Zuhair Cruz MD (02/12/20 10:09:14)                 Impression:      Enlarged cardiomediastinal silhouette, peribronchial cuffing, and bilateral perihilar interstitial opacities is consistent with CHF and pulmonary edema.      Electronically signed by: Zuhair Cruz MD  Date:    02/12/2020  Time:    10:09             Narrative:    EXAMINATION:  XR CHEST AP PORTABLE    CLINICAL HISTORY:  CHF;    FINDINGS:  Portable chest with comparison dated 12/29/2019.  The cardiomediastinal silhouette is enlarged.  There is prominence of the pulmonary hilar vascular structures with bilateral peribronchial cuffing.Bilateral perihilar interstitial opacities demonstrated, worst at the lung bases.  No acute  osseous abnormality.                              X-Rays:   Independently Interpreted Readings:   Other Readings:  Chest x-ray shows cardiomegaly and pulmonary vascular congestion bilaterally consistent with congestive heart failure and pulmonary edema    Medical Decision Making:   Differential Diagnosis:   54-year-old male presented emergency department with shortness of breath and orthopnea and paroxysmal nocturnal dyspnea.  Patient is hypertensive and patient has atrial fibrillation with rapid ventricular response and presentation consistent with pulmonary edema secondary to congestive heart failure exacerbation.  Patient treated with nitroglycerin and patient on blood thinners.  Patient treated with diltiazem for heart rate control and Lasix used for diuresis.  Patient advised to stay in the hospital for further treatment.  Hospital Medicine consult for evaluation for further management.  Patient did have subjective improvement with treatment.  Patient also has elevated troponin.  Patient felt better and blood pressure and heart rate improved with treatment.  Patient's troponin is elevated.  Patient almost symptom free with treatment and blood pressure improved.  Hospital Medicine consult for evaluation for admission and further management.  Clinical Tests:   Lab Tests: Reviewed  Radiological Study: Reviewed  Medical Tests: Reviewed                                 Clinical Impression:       ICD-10-CM ICD-9-CM   1. Systolic congestive heart failure, unspecified HF chronicity I50.20 428.20     428.0   2. SOB (shortness of breath) R06.02 786.05   3. Shortness of breath R06.02 786.05   4. Essential hypertension I10 401.9   5. Atrial fibrillation with rapid ventricular response I48.91 427.31   6. Non-ST elevation myocardial infarction (NSTEMI) I21.4 410.70                             Felicia Benz MD  02/12/20 1003       Felicia Benz MD  02/12/20 1133

## 2020-02-12 NOTE — ED NOTES
Patient requested to speak with case management regarding financial issues.  SW (Prisca) has spoken with patient and patient's significant other and has given them information that is needed to complete financial aid application.

## 2020-02-12 NOTE — H&P
[facility] Medicine  History & Physical    Patient Name: Jarret Jessica  MRN: [id number]  Admission Date: 2/12/2020  Attending Physician: Felicia Benz MD   Primary Care Provider: Primary Doctor No         Patient information was obtained from patient, relative(s), past medical records and ER records.     Subjective:     Principal Problem:Acute on chronic systolic congestive heart failure    Chief Complaint:   Chief Complaint   Patient presents with    Shortness of Breath     Orthopnea with bilateral lower extremity edema        HPI: Mr. Jessica is a 54-year-old male who presented to the ED with chief complaint of shortness of breath.  Patient with recent [facility] admission as transfer from [facility] from 12/29/19-1/3/20 with new onset atrial fibrillation with RVR, during that admission was found to have elevated troponin with echocardiogram revealing severely reduced EF at 25%, left heart catheterization with nonobstructive coronary artery disease, acute systolic heart failure needing IV Lasix drip, persistent atrial fibrillation.  Patient states he did follow-up 2 weeks post hospital discharge, only changes made were discontinuation of insulin as this was too expensive for the patient to afford and initiation of metformin.  He now presents with 3 days history of progressively worsening shortness of breath, location bilateral chest, associated with intermittent cough productive of clear phlegm, severe bilateral lower extremity swelling as well as orthopnea.  He states he was not able to sleep over the last 3 days, unable to lie flat, not able to get comfortable, he thought initially this was an adverse effect of the medications he was recently placed on and therefore he self discontinued all medications including Lasix over the last 2 days.  He does not consistently check his blood pressure at home.  Has been checking his blood glucose and as per relative at bedside has been running  consistently less than 200.  Has not been tracking his weight, unsure about dietary restrictions.  Patient and relative reviewed heart failure educational booklet yesterday and noted symptoms with leg edema were concerning for heart failure and therefore seek medical attention today. In the ED he was tachycardic, atrial fibrillation, , hypertensive, /96.  Labs as per chart review WBC 5.34, H/H 13.3/42.3, platelet 194, sodium 140, potassium 4.1, BUN 17, creatinine 1.3, BNP 1 289, troponin 0.192.  Glucose 155.  Chest x-ray with cardiomegaly with pulmonary edema.  EKG atrial fibrillation with RVR with heart rate 177.  He received diltiazem 10 mg IV bolus followed by initiation of drip, aspirin 325 mg, Lasix 40 mg IV as well as topical nitro paste.  During my encounter patient had urinated multiple times.  Case discussed with ED provider.  Did also communicated with patient's cardiologist, given EF of 25%, relative contraindication to calcium channel blocker and therefore will switch to amiodarone infusion as per protocol.    Past Medical History:   Diagnosis Date    CHF (congestive heart failure)     Diabetes mellitus     Diverticulitis 2010    Hypertension     Nicotine abuse     Obesity        Past Surgical History:   Procedure Laterality Date    ANGIOGRAM, CORONARY, WITH LEFT HEART CATHETERIZATION N/A 12/30/2019    Procedure: Angiogram, Coronary, with Left Heart Cath;  Surgeon: Gasper Person MD;  Location: [facility] CATH/EP LAB;  Service: Cardiology;  Laterality: N/A;       Review of patient's allergies indicates:  No Known Allergies    No current facility-administered medications on file prior to encounter.      Current Outpatient Medications on File Prior to Encounter   Medication Sig    cloNIDine (CATAPRES) 0.1 MG tablet Take 1 tablet (0.1 mg total) by mouth every 6 (six) hours as needed (180).    magnesium oxide 500 mg Cap Take 500 mg by mouth once daily.    alcohol swabs (BD ALCOHOL  SWABS) PadM Use as directed    apixaban (ELIQUIS) 5 mg Tab Take 1 tablet (5 mg total) by mouth 2 (two) times daily.    blood sugar diagnostic (TRUE METRIX GLUCOSE TEST STRIP) Strp use as directed to test blood sugar    eplerenone (INSPRA) 25 MG Tab Take 1 tablet (25 mg total) by mouth once daily.    furosemide (LASIX) 40 MG tablet Take 1 tablet (40 mg total) by mouth once daily.    lancets (TRUEPLUS LANCETS) 30 gauge Misc Use as directed to test blood sugar    lisinopril (PRINIVIL,ZESTRIL) 20 MG tablet Take 1 tablet (20 mg total) by mouth 2 (two) times daily.    metFORMIN (GLUCOPHAGE) 500 MG tablet Take 500 mg by mouth 2 (two) times daily.     metoprolol succinate (TOPROL-XL) 50 MG 24 hr tablet Take 1 tablet (50 mg total) by mouth 2 (two) times daily.    [DISCONTINUED] blood-glucose meter (TRUE METRIX GLUCOSE METER) Misc use as directed    [DISCONTINUED] insulin detemir U-100 (LEVEMIR FLEXTOUCH) 100 unit/mL (3 mL) SubQ InPn pen Inject 10 Units into the skin every evening.    [DISCONTINUED] magnesium oxide (MAG-OX) 400 mg (241.3 mg magnesium) tablet Take 2 tablets (800 mg total) by mouth as needed (if magnesium level is 1.5-1.8 mg/dL give 800 mg every 4 hours x 2 doses).     Family History     Problem Relation (Age of Onset)    Heart disease Father, Mother    Hypertension Mother        Tobacco Use    Smoking status: Current Every Day Smoker     Packs/day: 1.50     Years: 30.00     Pack years: 45.00     Types: Cigarettes    Smokeless tobacco: Never Used    Tobacco comment: States he has recently cut down   Substance and Sexual Activity    Alcohol use: Not Currently     Comment: Denies any current alcohol use    Drug use: No    Sexual activity: Not Currently     Review of Systems   Constitutional: Positive for appetite change and fatigue. Negative for chills and fever.   HENT: Negative for postnasal drip, rhinorrhea, sinus pressure, sinus pain, sneezing and trouble swallowing.    Respiratory: Positive  for cough (Intermittently productive of clear phlegm) and shortness of breath. Negative for wheezing and stridor.    Cardiovascular: Positive for chest pain (Described as discomfort more than pain), palpitations (Intermittent) and leg swelling.   Gastrointestinal: Positive for constipation (States last bowel movement 3 days ago). Negative for blood in stool.   Genitourinary: Negative for difficulty urinating, dysuria, frequency, hematuria and urgency.   Musculoskeletal: Negative for neck stiffness.   Skin: Negative for wound.   Allergic/Immunologic: Negative for immunocompromised state.   Neurological: Positive for weakness.   Hematological: Does not bruise/bleed easily.   Psychiatric/Behavioral: Positive for sleep disturbance.     Objective:     Vital Signs (Most Recent):  Temp: 98.2 °F (36.8 °C) (02/12/20 0926)  Pulse: 90 (02/12/20 1300)  Resp: 15 (02/12/20 1300)  BP: (!) 154/91 (02/12/20 1300)  SpO2: (!) 94 % (02/12/20 1300) Vital Signs (24h Range):  Temp:  [98.2 °F (36.8 °C)] 98.2 °F (36.8 °C)  Pulse:  [] 90  Resp:  [15-41] 15  SpO2:  [86 %-95 %] 94 %  BP: (136-202)/() 154/91     Weight: 116.6 kg (257 lb)  Body mass index is 35.84 kg/m².    Physical Exam   Constitutional: He is oriented to person, place, and time. He appears well-developed and well-nourished. No distress.   Obese male, sitting upright in stretcher, initially sleeping but easily arousable, cooperative   HENT:   Head: Normocephalic and atraumatic.   Eyes: Pupils are equal, round, and reactive to light. Conjunctivae and EOM are normal. Right eye exhibits no discharge. Left eye exhibits no discharge.   Neck: Neck supple.   Cardiovascular:   Irregular, heart rates in the 90s, 3+ bilateral pitting edema most prominent pedal   Pulmonary/Chest: No stridor. No respiratory distress. He has no wheezes.   On supplemental oxygen via nasal cannula, bibasilar inspiratory crackles   Abdominal: Bowel sounds are normal. There is no rebound and no  guarding.   Protuberant abdomen, nontender   Genitourinary:   Genitourinary Comments: No Lyon catheter present.  Bedside urinal with light yellow urine   Musculoskeletal: He exhibits no edema.   Neurological: He is alert and oriented to person, place, and time.   Speech intact.  Moving all 4 extremities.  Following commands.   Skin: Skin is warm and dry. He is not diaphoretic.   Tattoos present   Psychiatric: He has a normal mood and affect.   Vitals reviewed.        CRANIAL NERVES     CN III, IV, VI   Pupils are equal, round, and reactive to light.  Extraocular motions are normal.        Significant Labs:   BMP: No results for input(s): GLU, NA, K, CL, CO2, BUN, CREATININE, CALCIUM, MG in the last 48 hours.  CBC: No results for input(s): WBC, HGB, HCT, PLT in the last 48 hours.  CMP: No results for input(s): NA, K, CL, CO2, GLU, BUN, CREATININE, CALCIUM, PROT, ALBUMIN, BILITOT, ALKPHOS, AST, ALT, ANIONGAP, EGFRNONAA in the last 48 hours.    Invalid input(s): ESTGFAFRICA  Cardiac Markers: No results for input(s): CKMB, MYOGLOBIN, BNP, TROPISTAT in the last 48 hours.  Coagulation: No results for input(s): PT, INR, APTT in the last 48 hours.  Magnesium: No results for input(s): MG in the last 48 hours.  POCT Glucose: No results for input(s): POCTGLUCOSE in the last 48 hours.  Troponin: No results for input(s): TROPONINI in the last 48 hours.  TSH:   Recent Labs   Lab 12/29/19  0739   TSH 3.440     Urine Culture: No results for input(s): LABURIN in the last 48 hours.  Urine Studies: No results for input(s): COLORU, APPEARANCEUA, PHUR, SPECGRAV, PROTEINUA, GLUCUA, KETONESU, BILIRUBINUA, OCCULTUA, NITRITE, UROBILINOGEN, LEUKOCYTESUR, RBCUA, WBCUA, BACTERIA, SQUAMEPITHEL, HYALINECASTS in the last 48 hours.    Invalid input(s): WRIGHTSUR  All pertinent labs within the past 24 hours have been reviewed.    Significant Imaging: I have reviewed all pertinent imaging results/findings within the past 24 hours.     X-ray Chest  Ap Portable    Result Date: 2/12/2020  EXAMINATION: XR CHEST AP PORTABLE CLINICAL HISTORY: CHF; FINDINGS: Portable chest with comparison dated 12/29/2019.  The cardiomediastinal silhouette is enlarged.  There is prominence of the pulmonary hilar vascular structures with bilateral peribronchial cuffing.Bilateral perihilar interstitial opacities demonstrated, worst at the lung bases.  No acute osseous abnormality.     Enlarged cardiomediastinal silhouette, peribronchial cuffing, and bilateral perihilar interstitial opacities is consistent with CHF and pulmonary edema. Electronically signed by: Zuhair Cruz MD Date:    02/12/2020 Time:    10:09    ECG Results          EKG 12-lead (In process)  Result time 02/12/20 09:39:06    In process by Interface, Lab In [facility] (02/12/20 09:39:06)                 Narrative:    Test Reason : R06.02,    Vent. Rate : 117 BPM     Atrial Rate : 119 BPM     P-R Int : 000 ms          QRS Dur : 102 ms      QT Int : 306 ms       P-R-T Axes : 000 023 109 degrees     QTc Int : 426 ms    Atrial fibrillation with rapid ventricular response  Septal infarct ,age undetermined  Abnormal ECG  When compared with ECG of 30-DEC-2019 06:04,  Vent. rate has increased BY  56 BPM  Nonspecific T wave abnormality now evident in Inferior leads    Referred By: [email or web address]   SELF           Confirmed By:                             Echocardiogram  · Moderate concentric left ventricular hypertrophy.  · Mild left ventricular enlargement.  · Severely decreased left ventricular systolic function. The estimated ejection fraction is 25%  · Severe global hypokinetic wall motion.  · Atrial fibrillation observed with restrictive filling pattern present.  · Low normal right ventricular systolic function.  · Mild left atrial enlargement.  · Intermediate central venous pressure (8 mm Hg).  · The estimated PA systolic pressure is 35 mm Hg  · The aortic root is mildly dilated.    Assessment/Plan:     * Acute on chronic  systolic congestive heart failure  Patient with recent admission with acute systolic heart failure requiring IV Lasix infusion.  Echocardiogram at that time with EF of 25% with severe LV dysfunction/hypokinesia.  Now presenting with worsening shortness of breath, orthopnea, lower extremity edema, elevated BNP with pulmonary edema on imaging all consistent with decompensated heart failure, likely multifactorial in the setting of atrial fibrillation with RVR, noncompliance with medication, inconsistency with diet versus other.  Admit inpatient, cardiac telemetry  IV Lasix 40 mg b.i.d., assess response, in the past was on Lasix drip, will re-evaluate need daily  Daily BMP while on IV Lasix  Strict I/O, daily weights, oral fluid and sodium restriction  Patient needs continue reinforced CHF education  Consulting Cardiology, discussed on admission      Persistent atrial fibrillation, now with RVR  New onset atrial fibrillation during last hospitalized day.  Permanent atrial fibrillation, he was not cardioverted, now with RVR likely secondary to HF and noncompliance with medication.  Given EF of 25% will discontinue Cardizem infusion.  Start amiodarone infusion as per protocol  Continue beta-blocker, was supposed to be on this chronically, despite decompensated heart failure  Continue Eliquis for anticoagulation, will clarify with Cardiology if 1 to transition to Lovenox therapeutic dosing  Monitor and replace lytes  Patient had bradycardia with digoxin previously  May need cardioversion as per Cardiology with CHARLES        Essential hypertension, uncontrolled  Blood pressure up to 188/96 on presentation.  Restart potassium-sparing diuretic, lisinopril, metoprolol, IV diuresis.  Check urine drug screen  Continue to monitor blood pressure and adjust as needed    Noncompliance with financial restrictions  Patient has been noncompliant with medications.  Also limited due to financial restrictions with lack of insurance.  Needs  continued counseling.      Class 2 obesity in adult  BMI 36.      Type 2 diabetes mellitus  During last hospital stay was found to have new diagnosis of diabetes mellitus with HbA1c 13.  Due to insurance issues he was switched to metformin as an outpatient as he could not afford insulin.  Will hold metformin, basal bolus insulin while admitted.  Accu-Cheks  1800 calories diabetic diet with 1.5 L fluid restriction.  As needed hypoglycemic measures      Nonischemic cardiomyopathy  During last hospital stay EF of 25%.  Did have left heart catheterization with normal coronaries no evidence of CAD.  Currently on beta-blocker, potassium-sparing diuretic, Ace inhibitor.   Does not have Life insurance which limits goal directed therapy.    Tobacco abuse  States he has been cutting back since recent admission.      BRI (obstructive sleep apnea)  Suspect patient may have undiagnosed untreated BRI.  Ordered empiric CPAP overnight while hospitalized.      Elevated troponin  Troponin elevated at 0.192.  Had recent left heart catheterization with normal coronaries.  Possibly secondary to atrial fibrillation/tachyarrhythmia versus decompensated heart failure versus uncontrolled hypertension.        VTE Risk Mitigation (From admission, onward)         Ordered     apixaban tablet 5 mg  2 times daily      02/12/20 6932                   Keila Berrios MD  Department of Hospital Medicine   [facility]

## 2020-02-12 NOTE — ASSESSMENT & PLAN NOTE
New onset atrial fibrillation during last hospitalized day.  Permanent atrial fibrillation, he was not cardioverted, now with RVR likely secondary to HF and noncompliance with medication.  Given EF of 25% will discontinue Cardizem infusion.  Start amiodarone infusion as per protocol  Continue beta-blocker, was supposed to be on this chronically, despite decompensated heart failure  Continue Eliquis for anticoagulation, will clarify with Cardiology if 1 to transition to Lovenox therapeutic dosing  Monitor and replace lytes  Patient had bradycardia with digoxin previously  May need cardioversion as per Cardiology with CHARLES

## 2020-02-12 NOTE — HPI
Mr. Jessica is a 54-year-old male who presented to the ED with chief complaint of shortness of breath.  Patient with recent [facility] admission as transfer from [facility] from 12/29/19-1/3/20 with new onset atrial fibrillation with RVR, during that admission was found to have elevated troponin with echocardiogram revealing severely reduced EF at 25%, left heart catheterization with nonobstructive coronary artery disease, acute systolic heart failure needing IV Lasix drip, persistent atrial fibrillation.  Patient states he did follow-up 2 weeks post hospital discharge, only changes made were discontinuation of insulin as this was too expensive for the patient to afford and initiation of metformin.  He now presents with 3 days history of progressively worsening shortness of breath, location bilateral chest, associated with intermittent cough productive of clear phlegm, severe bilateral lower extremity swelling as well as orthopnea.  He states he was not able to sleep over the last 3 days, unable to lie flat, not able to get comfortable, he thought initially this was an adverse effect of the medications he was recently placed on and therefore he self discontinued all medications including Lasix over the last 2 days.  He does not consistently check his blood pressure at home.  Has been checking his blood glucose and as per relative at bedside has been running consistently less than 200.  Has not been tracking his weight, unsure about dietary restrictions.  Patient and relative reviewed heart failure educational booklet yesterday and noted symptoms with leg edema were concerning for heart failure and therefore seek medical attention today. In the ED he was tachycardic, atrial fibrillation, , hypertensive, /96.  Labs as per chart review WBC 5.34, H/H 13.3/42.3, platelet 194, sodium 140, potassium 4.1, BUN 17, creatinine 1.3, BNP 1 289, troponin 0.192.  Glucose 155.  Chest x-ray with cardiomegaly with  pulmonary edema.  EKG atrial fibrillation with RVR with heart rate 177.  He received diltiazem 10 mg IV bolus followed by initiation of drip, aspirin 325 mg, Lasix 40 mg IV as well as topical nitro paste.  During my encounter patient had urinated multiple times.  Case discussed with ED provider.  Did also communicated with patient's cardiologist, given EF of 25%, relative contraindication to calcium channel blocker and therefore will switch to amiodarone infusion as per protocol.

## 2020-02-12 NOTE — ASSESSMENT & PLAN NOTE
During last hospital stay EF of 25%.  Did have left heart catheterization with normal coronaries no evidence of CAD.  Currently on beta-blocker, potassium-sparing diuretic, Ace inhibitor.   Does not have Life insurance which limits goal directed therapy.

## 2020-02-12 NOTE — ASSESSMENT & PLAN NOTE
During last hospital stay was found to have new diagnosis of diabetes mellitus with HbA1c 13.  Due to insurance issues he was switched to metformin as an outpatient as he could not afford insulin.  Will hold metformin, basal bolus insulin while admitted.  Accu-Cheks  1800 calories diabetic diet with 1.5 L fluid restriction.  As needed hypoglycemic measures

## 2020-02-12 NOTE — SUBJECTIVE & OBJECTIVE
Past Medical History:   Diagnosis Date    CHF (congestive heart failure)     Diabetes mellitus     Diverticulitis 2010    Hypertension     Nicotine abuse     Obesity        Past Surgical History:   Procedure Laterality Date    ANGIOGRAM, CORONARY, WITH LEFT HEART CATHETERIZATION N/A 12/30/2019    Procedure: Angiogram, Coronary, with Left Heart Cath;  Surgeon: Gasper Person MD;  Location: [facility] CATH/EP LAB;  Service: Cardiology;  Laterality: N/A;       Review of patient's allergies indicates:  No Known Allergies    No current facility-administered medications on file prior to encounter.      Current Outpatient Medications on File Prior to Encounter   Medication Sig    cloNIDine (CATAPRES) 0.1 MG tablet Take 1 tablet (0.1 mg total) by mouth every 6 (six) hours as needed (180).    magnesium oxide 500 mg Cap Take 500 mg by mouth once daily.    alcohol swabs (BD ALCOHOL SWABS) PadM Use as directed    apixaban (ELIQUIS) 5 mg Tab Take 1 tablet (5 mg total) by mouth 2 (two) times daily.    blood sugar diagnostic (TRUE METRIX GLUCOSE TEST STRIP) Strp use as directed to test blood sugar    eplerenone (INSPRA) 25 MG Tab Take 1 tablet (25 mg total) by mouth once daily.    furosemide (LASIX) 40 MG tablet Take 1 tablet (40 mg total) by mouth once daily.    lancets (TRUEPLUS LANCETS) 30 gauge Misc Use as directed to test blood sugar    lisinopril (PRINIVIL,ZESTRIL) 20 MG tablet Take 1 tablet (20 mg total) by mouth 2 (two) times daily.    metFORMIN (GLUCOPHAGE) 500 MG tablet Take 500 mg by mouth 2 (two) times daily.     metoprolol succinate (TOPROL-XL) 50 MG 24 hr tablet Take 1 tablet (50 mg total) by mouth 2 (two) times daily.    [DISCONTINUED] blood-glucose meter (TRUE METRIX GLUCOSE METER) Misc use as directed    [DISCONTINUED] insulin detemir U-100 (LEVEMIR FLEXTOUCH) 100 unit/mL (3 mL) SubQ InPn pen Inject 10 Units into the skin every evening.    [DISCONTINUED] magnesium oxide (MAG-OX) 400 mg  (241.3 mg magnesium) tablet Take 2 tablets (800 mg total) by mouth as needed (if magnesium level is 1.5-1.8 mg/dL give 800 mg every 4 hours x 2 doses).     Family History     Problem Relation (Age of Onset)    Heart disease Father, Mother    Hypertension Mother        Tobacco Use    Smoking status: Current Every Day Smoker     Packs/day: 1.50     Years: 30.00     Pack years: 45.00     Types: Cigarettes    Smokeless tobacco: Never Used    Tobacco comment: States he has recently cut down   Substance and Sexual Activity    Alcohol use: Not Currently     Comment: Denies any current alcohol use    Drug use: No    Sexual activity: Not Currently     Review of Systems   Constitutional: Positive for appetite change and fatigue. Negative for chills and fever.   HENT: Negative for postnasal drip, rhinorrhea, sinus pressure, sinus pain, sneezing and trouble swallowing.    Respiratory: Positive for cough (Intermittently productive of clear phlegm) and shortness of breath. Negative for wheezing and stridor.    Cardiovascular: Positive for chest pain (Described as discomfort more than pain), palpitations (Intermittent) and leg swelling.   Gastrointestinal: Positive for constipation (States last bowel movement 3 days ago). Negative for blood in stool.   Genitourinary: Negative for difficulty urinating, dysuria, frequency, hematuria and urgency.   Musculoskeletal: Negative for neck stiffness.   Skin: Negative for wound.   Allergic/Immunologic: Negative for immunocompromised state.   Neurological: Positive for weakness.   Hematological: Does not bruise/bleed easily.   Psychiatric/Behavioral: Positive for sleep disturbance.     Objective:     Vital Signs (Most Recent):  Temp: 98.2 °F (36.8 °C) (02/12/20 0926)  Pulse: 90 (02/12/20 1300)  Resp: 15 (02/12/20 1300)  BP: (!) 154/91 (02/12/20 1300)  SpO2: (!) 94 % (02/12/20 1300) Vital Signs (24h Range):  Temp:  [98.2 °F (36.8 °C)] 98.2 °F (36.8 °C)  Pulse:  [] 90  Resp:   [15-41] 15  SpO2:  [86 %-95 %] 94 %  BP: (136-202)/() 154/91     Weight: 116.6 kg (257 lb)  Body mass index is 35.84 kg/m².    Physical Exam   Constitutional: He is oriented to person, place, and time. He appears well-developed and well-nourished. No distress.   Obese male, sitting upright in stretcher, initially sleeping but easily arousable, cooperative   HENT:   Head: Normocephalic and atraumatic.   Eyes: Pupils are equal, round, and reactive to light. Conjunctivae and EOM are normal. Right eye exhibits no discharge. Left eye exhibits no discharge.   Neck: Neck supple.   Cardiovascular:   Irregular, heart rates in the 90s, 3+ bilateral pitting edema most prominent pedal   Pulmonary/Chest: No stridor. No respiratory distress. He has no wheezes.   On supplemental oxygen via nasal cannula, bibasilar inspiratory crackles   Abdominal: Bowel sounds are normal. There is no rebound and no guarding.   Protuberant abdomen, nontender   Genitourinary:   Genitourinary Comments: No Lyon catheter present.  Bedside urinal with light yellow urine   Musculoskeletal: He exhibits no edema.   Neurological: He is alert and oriented to person, place, and time.   Speech intact.  Moving all 4 extremities.  Following commands.   Skin: Skin is warm and dry. He is not diaphoretic.   Tattoos present   Psychiatric: He has a normal mood and affect.   Vitals reviewed.        CRANIAL NERVES     CN III, IV, VI   Pupils are equal, round, and reactive to light.  Extraocular motions are normal.        Significant Labs:   BMP: No results for input(s): GLU, NA, K, CL, CO2, BUN, CREATININE, CALCIUM, MG in the last 48 hours.  CBC: No results for input(s): WBC, HGB, HCT, PLT in the last 48 hours.  CMP: No results for input(s): NA, K, CL, CO2, GLU, BUN, CREATININE, CALCIUM, PROT, ALBUMIN, BILITOT, ALKPHOS, AST, ALT, ANIONGAP, EGFRNONAA in the last 48 hours.    Invalid input(s): ESTGFAFRICA  Cardiac Markers: No results for input(s): CKMB, MYOGLOBIN,  BNP, TROPISTAT in the last 48 hours.  Coagulation: No results for input(s): PT, INR, APTT in the last 48 hours.  Magnesium: No results for input(s): MG in the last 48 hours.  POCT Glucose: No results for input(s): POCTGLUCOSE in the last 48 hours.  Troponin: No results for input(s): TROPONINI in the last 48 hours.  TSH:   Recent Labs   Lab 12/29/19  0739   TSH 3.440     Urine Culture: No results for input(s): LABURIN in the last 48 hours.  Urine Studies: No results for input(s): COLORU, APPEARANCEUA, PHUR, SPECGRAV, PROTEINUA, GLUCUA, KETONESU, BILIRUBINUA, OCCULTUA, NITRITE, UROBILINOGEN, LEUKOCYTESUR, RBCUA, WBCUA, BACTERIA, SQUAMEPITHEL, HYALINECASTS in the last 48 hours.    Invalid input(s): WRIGHTSUR  All pertinent labs within the past 24 hours have been reviewed.    Significant Imaging: I have reviewed all pertinent imaging results/findings within the past 24 hours.     X-ray Chest Ap Portable    Result Date: 2/12/2020  EXAMINATION: XR CHEST AP PORTABLE CLINICAL HISTORY: CHF; FINDINGS: Portable chest with comparison dated 12/29/2019.  The cardiomediastinal silhouette is enlarged.  There is prominence of the pulmonary hilar vascular structures with bilateral peribronchial cuffing.Bilateral perihilar interstitial opacities demonstrated, worst at the lung bases.  No acute osseous abnormality.     Enlarged cardiomediastinal silhouette, peribronchial cuffing, and bilateral perihilar interstitial opacities is consistent with CHF and pulmonary edema. Electronically signed by: Zuhair Cruz MD Date:    02/12/2020 Time:    10:09    ECG Results          EKG 12-lead (In process)  Result time 02/12/20 09:39:06    In process by Interface, Lab In [facility] (02/12/20 09:39:06)                 Narrative:    Test Reason : R06.02,    Vent. Rate : 117 BPM     Atrial Rate : 119 BPM     P-R Int : 000 ms          QRS Dur : 102 ms      QT Int : 306 ms       P-R-T Axes : 000 023 109 degrees     QTc Int : 426 ms    Atrial fibrillation  with rapid ventricular response  Septal infarct ,age undetermined  Abnormal ECG  When compared with ECG of 30-DEC-2019 06:04,  Vent. rate has increased BY  56 BPM  Nonspecific T wave abnormality now evident in Inferior leads    Referred By: [email or web address]   SELF           Confirmed By:                             Echocardiogram  · Moderate concentric left ventricular hypertrophy.  · Mild left ventricular enlargement.  · Severely decreased left ventricular systolic function. The estimated ejection fraction is 25%  · Severe global hypokinetic wall motion.  · Atrial fibrillation observed with restrictive filling pattern present.  · Low normal right ventricular systolic function.  · Mild left atrial enlargement.  · Intermediate central venous pressure (8 mm Hg).  · The estimated PA systolic pressure is 35 mm Hg  · The aortic root is mildly dilated.

## 2020-02-12 NOTE — ASSESSMENT & PLAN NOTE
Patient has been noncompliant with medications.  Also limited due to financial restrictions with lack of insurance.  Needs continued counseling.

## 2020-02-12 NOTE — ASSESSMENT & PLAN NOTE
Patient with recent admission with acute systolic heart failure requiring IV Lasix infusion.  Echocardiogram at that time with EF of 25% with severe LV dysfunction/hypokinesia.  Now presenting with worsening shortness of breath, orthopnea, lower extremity edema, elevated BNP with pulmonary edema on imaging all consistent with decompensated heart failure, likely multifactorial in the setting of atrial fibrillation with RVR, noncompliance with medication, inconsistency with diet versus other.  Admit inpatient, cardiac telemetry  IV Lasix 40 mg b.i.d., assess response, in the past was on Lasix drip, will re-evaluate need daily  Daily BMP while on IV Lasix  Strict I/O, daily weights, oral fluid and sodium restriction  Patient needs continue reinforced CHF education  Consulting Cardiology, discussed on admission

## 2020-02-12 NOTE — ED NOTES
Notified Dr. Benz of critical troponin level (0.192) and high blood pressure due to patient not taking BP medicine for past two days.  Dr. Benz stated patient can eat.  He is at the patient's bedside now.

## 2020-02-12 NOTE — ASSESSMENT & PLAN NOTE
Suspect patient may have undiagnosed untreated BRI.  Ordered empiric CPAP overnight while hospitalized.

## 2020-02-12 NOTE — ASSESSMENT & PLAN NOTE
Blood pressure up to 188/96 on presentation.  Restart potassium-sparing diuretic, lisinopril, metoprolol, IV diuresis.  Check urine drug screen  Continue to monitor blood pressure and adjust as needed

## 2020-02-12 NOTE — ASSESSMENT & PLAN NOTE
Admission labs with BUN/creatinine 17/1.3, previously normal.  Suspect cardiorenal syndrome.  IV diuresis.  Monitoring renal function.     Problem: Pain  Goal: #Acceptable pain level achieved/maintained at rest using NRS/Faces  This goal is used for patients who can self-report.  Acceptable means the level is at or below the identified comfort/function goal.  Outcome: Outcome Met, Continue evaluating goal progress toward completion  Pt appears to be resting comfortably at this time, writer will continue to monitor for pain/discomfort.

## 2020-02-12 NOTE — PLAN OF CARE
GREGORY consulted for assistance in obtaining information about insurance application. GREGORY met with Pt and partner, Bria ([phone]) to speak about insurance coverage. GREGORY offered to contact Gabriel about insurance application process. GREGORY contacted Gabriel who explained that Pt has applied for [place] Medicaid at [facility] before and did not meet criteria as recent as January 2020. Gabriel was not able to offer help but directed SW to business office. GREGORY contacted Ce Camarena in the business office. Ce notified SW that Pt does have a current application for financial assistance open but needs to send his income tax returns to her. GREGORY took down information and presented it to both Pt and partner. SW to follow for any additional case management needs.

## 2020-02-12 NOTE — ASSESSMENT & PLAN NOTE
Troponin elevated at 0.192.  Had recent left heart catheterization with normal coronaries.  Possibly secondary to atrial fibrillation/tachyarrhythmia versus decompensated heart failure versus uncontrolled hypertension.

## 2020-02-13 ENCOUNTER — ANESTHESIA (OUTPATIENT)
Dept: CARDIOLOGY | Facility: HOSPITAL | Age: 55
DRG: 292 | End: 2020-02-13

## 2020-02-13 ENCOUNTER — ANESTHESIA EVENT (OUTPATIENT)
Dept: CARDIOLOGY | Facility: HOSPITAL | Age: 55
DRG: 292 | End: 2020-02-13

## 2020-02-13 PROBLEM — N17.9 AKI (ACUTE KIDNEY INJURY): Status: ACTIVE | Noted: 2020-02-13

## 2020-02-13 LAB
ANION GAP SERPL CALC-SCNC: 7 MMOL/L (ref 8–16)
BUN SERPL-MCNC: 19 MG/DL (ref 6–20)
CALCIUM SERPL-MCNC: 8.3 MG/DL (ref 8.7–10.5)
CHLORIDE SERPL-SCNC: 104 MMOL/L (ref 95–110)
CO2 SERPL-SCNC: 28 MMOL/L (ref 23–29)
CREAT SERPL-MCNC: 1.5 MG/DL (ref 0.5–1.4)
ERYTHROCYTE [DISTWIDTH] IN BLOOD BY AUTOMATED COUNT: 15 % (ref 11.5–14.5)
EST. GFR  (AFRICAN AMERICAN): 59.3 ML/MIN/1.73 M^2
EST. GFR  (NON AFRICAN AMERICAN): 51.3 ML/MIN/1.73 M^2
GLUCOSE SERPL-MCNC: 149 MG/DL (ref 70–110)
GLUCOSE SERPL-MCNC: 155 MG/DL (ref 70–110)
GLUCOSE SERPL-MCNC: 156 MG/DL (ref 70–110)
GLUCOSE SERPL-MCNC: 244 MG/DL (ref 70–110)
HCT VFR BLD AUTO: 40.8 % (ref 40–54)
HGB BLD-MCNC: 12.9 G/DL (ref 14–18)
MAGNESIUM SERPL-MCNC: 1.9 MG/DL (ref 1.6–2.6)
MCH RBC QN AUTO: 26.3 PG (ref 27–31)
MCHC RBC AUTO-ENTMCNC: 31.6 G/DL (ref 32–36)
MCV RBC AUTO: 83 FL (ref 82–98)
PHOSPHATE SERPL-MCNC: 4.2 MG/DL (ref 2.7–4.5)
PLATELET # BLD AUTO: 187 K/UL (ref 150–350)
PMV BLD AUTO: 12.8 FL (ref 9.2–12.9)
POTASSIUM SERPL-SCNC: 3.6 MMOL/L (ref 3.5–5.1)
RBC # BLD AUTO: 4.9 M/UL (ref 4.6–6.2)
SODIUM SERPL-SCNC: 139 MMOL/L (ref 136–145)
WBC # BLD AUTO: 5.67 K/UL (ref 3.9–12.7)

## 2020-02-13 PROCEDURE — 85027 COMPLETE CBC AUTOMATED: CPT

## 2020-02-13 PROCEDURE — 83735 ASSAY OF MAGNESIUM: CPT

## 2020-02-13 PROCEDURE — 36415 COLL VENOUS BLD VENIPUNCTURE: CPT

## 2020-02-13 PROCEDURE — 27000221 HC OXYGEN, UP TO 24 HOURS

## 2020-02-13 PROCEDURE — 94660 CPAP INITIATION&MGMT: CPT

## 2020-02-13 PROCEDURE — 25000003 PHARM REV CODE 250: Performed by: INTERNAL MEDICINE

## 2020-02-13 PROCEDURE — 99900035 HC TECH TIME PER 15 MIN (STAT)

## 2020-02-13 PROCEDURE — 80048 BASIC METABOLIC PNL TOTAL CA: CPT

## 2020-02-13 PROCEDURE — 63600175 PHARM REV CODE 636 W HCPCS: Performed by: INTERNAL MEDICINE

## 2020-02-13 PROCEDURE — 84100 ASSAY OF PHOSPHORUS: CPT

## 2020-02-13 PROCEDURE — 94761 N-INVAS EAR/PLS OXIMETRY MLT: CPT

## 2020-02-13 PROCEDURE — 21000000 HC CCU ICU ROOM CHARGE

## 2020-02-13 RX ORDER — POTASSIUM CHLORIDE 20 MEQ/1
20 TABLET, EXTENDED RELEASE ORAL
Status: DISCONTINUED | OUTPATIENT
Start: 2020-02-13 | End: 2020-02-14 | Stop reason: HOSPADM

## 2020-02-13 RX ORDER — POTASSIUM CHLORIDE 7.45 MG/ML
20 INJECTION INTRAVENOUS
Status: DISCONTINUED | OUTPATIENT
Start: 2020-02-13 | End: 2020-02-14 | Stop reason: HOSPADM

## 2020-02-13 RX ORDER — AMIODARONE HYDROCHLORIDE 200 MG/1
200 TABLET ORAL 3 TIMES DAILY
Status: DISCONTINUED | OUTPATIENT
Start: 2020-02-13 | End: 2020-02-14

## 2020-02-13 RX ORDER — CALCIUM CHLORIDE IN 0.9 % NACL 1 G/100 ML
1 INTRAVENOUS SOLUTION, PIGGYBACK (ML) INTRAVENOUS
Status: DISCONTINUED | OUTPATIENT
Start: 2020-02-13 | End: 2020-02-14 | Stop reason: HOSPADM

## 2020-02-13 RX ORDER — POTASSIUM CHLORIDE 20 MEQ/1
40 TABLET, EXTENDED RELEASE ORAL
Status: DISCONTINUED | OUTPATIENT
Start: 2020-02-13 | End: 2020-02-14 | Stop reason: HOSPADM

## 2020-02-13 RX ORDER — ALPRAZOLAM 0.5 MG/1
1 TABLET ORAL 3 TIMES DAILY PRN
Status: DISCONTINUED | OUTPATIENT
Start: 2020-02-13 | End: 2020-02-14 | Stop reason: HOSPADM

## 2020-02-13 RX ORDER — FUROSEMIDE 10 MG/ML
40 INJECTION INTRAMUSCULAR; INTRAVENOUS DAILY
Status: DISCONTINUED | OUTPATIENT
Start: 2020-02-14 | End: 2020-02-14 | Stop reason: HOSPADM

## 2020-02-13 RX ORDER — MAGNESIUM SULFATE HEPTAHYDRATE 40 MG/ML
4 INJECTION, SOLUTION INTRAVENOUS
Status: DISCONTINUED | OUTPATIENT
Start: 2020-02-13 | End: 2020-02-14 | Stop reason: HOSPADM

## 2020-02-13 RX ORDER — POTASSIUM CHLORIDE 7.45 MG/ML
40 INJECTION INTRAVENOUS
Status: DISCONTINUED | OUTPATIENT
Start: 2020-02-13 | End: 2020-02-14 | Stop reason: HOSPADM

## 2020-02-13 RX ORDER — LANOLIN ALCOHOL/MO/W.PET/CERES
800 CREAM (GRAM) TOPICAL
Status: DISCONTINUED | OUTPATIENT
Start: 2020-02-13 | End: 2020-02-14 | Stop reason: HOSPADM

## 2020-02-13 RX ORDER — MAGNESIUM SULFATE HEPTAHYDRATE 40 MG/ML
2 INJECTION, SOLUTION INTRAVENOUS
Status: DISCONTINUED | OUTPATIENT
Start: 2020-02-13 | End: 2020-02-14 | Stop reason: HOSPADM

## 2020-02-13 RX ORDER — MAGNESIUM SULFATE 1 G/100ML
1 INJECTION INTRAVENOUS
Status: DISCONTINUED | OUTPATIENT
Start: 2020-02-13 | End: 2020-02-14 | Stop reason: HOSPADM

## 2020-02-13 RX ADMIN — APIXABAN 5 MG: 5 TABLET, FILM COATED ORAL at 09:02

## 2020-02-13 RX ADMIN — LISINOPRIL 20 MG: 20 TABLET ORAL at 08:02

## 2020-02-13 RX ADMIN — FUROSEMIDE 40 MG: 20 INJECTION, SOLUTION INTRAMUSCULAR; INTRAVENOUS at 01:02

## 2020-02-13 RX ADMIN — POTASSIUM CHLORIDE 20 MEQ: 20 TABLET, EXTENDED RELEASE ORAL at 09:02

## 2020-02-13 RX ADMIN — LISINOPRIL 20 MG: 20 TABLET ORAL at 09:02

## 2020-02-13 RX ADMIN — METOPROLOL SUCCINATE 50 MG: 25 TABLET, FILM COATED, EXTENDED RELEASE ORAL at 08:02

## 2020-02-13 RX ADMIN — INSULIN DETEMIR 10 UNITS: 100 INJECTION, SOLUTION SUBCUTANEOUS at 08:02

## 2020-02-13 RX ADMIN — ALPRAZOLAM 1 MG: 0.5 TABLET ORAL at 04:02

## 2020-02-13 RX ADMIN — MAGNESIUM OXIDE 400 MG: 400 TABLET ORAL at 09:02

## 2020-02-13 RX ADMIN — AMIODARONE HYDROCHLORIDE 0.5 MG/MIN: 1.8 INJECTION, SOLUTION INTRAVENOUS at 07:02

## 2020-02-13 RX ADMIN — HUMAN INSULIN 4 UNITS: 100 INJECTION, SOLUTION SUBCUTANEOUS at 08:02

## 2020-02-13 RX ADMIN — APIXABAN 5 MG: 5 TABLET, FILM COATED ORAL at 08:02

## 2020-02-13 RX ADMIN — AMIODARONE HYDROCHLORIDE 200 MG: 200 TABLET ORAL at 04:02

## 2020-02-13 RX ADMIN — METOPROLOL SUCCINATE 50 MG: 25 TABLET, FILM COATED, EXTENDED RELEASE ORAL at 09:02

## 2020-02-13 RX ADMIN — EPLERENONE 25 MG: 25 TABLET, FILM COATED ORAL at 09:02

## 2020-02-13 RX ADMIN — AMIODARONE HYDROCHLORIDE 200 MG: 200 TABLET ORAL at 08:02

## 2020-02-13 RX ADMIN — POLYETHYLENE GLYCOL 3350 17 G: 17 POWDER, FOR SOLUTION ORAL at 09:02

## 2020-02-13 NOTE — ASSESSMENT & PLAN NOTE
High suspicion patient has untreated undiagnosed BRI.  At this time he does not have medical insurance and therefore not able to get outpatient sleep study for home CPAP.  He has been tolerating CPAP here while in the hospital.  Untreated BRI likely contributors to cardiac arrhythmia.

## 2020-02-13 NOTE — ASSESSMENT & PLAN NOTE
Renal function has up trended from 1.2-->1.5.  Decreased IV Lasix to once daily.  Renally dose all medications.  Repeat BMP tomorrow.

## 2020-02-13 NOTE — NURSING
Telemetry called and notified me that the pt had a 12 beat run of v-tach, I checked the pt and obtained a set of vital signs, after reviewing the current VS and labs I called and informed the on-call physician who acknowledged and gave no orders at this time.

## 2020-02-13 NOTE — ASSESSMENT & PLAN NOTE
During last hospital stay EF of 25%.  Did have left heart catheterization with normal coronaries no evidence of CAD.  Currently on beta-blocker, potassium-sparing diuretic, Ace inhibitor.   Patient lacks medical insurance, does not have Life Vest.  If cardioversion performed, CHARLES will re-evaluate EF.

## 2020-02-13 NOTE — ASSESSMENT & PLAN NOTE
New onset atrial fibrillation during last hospitalized stay  Permanent atrial fibrillation, he was not cardioverted, now with RVR likely secondary to HF and noncompliance with medication.  Given EF of 25%. Patient had bradycardia with digoxin previously  Complete amiodarone infusion.  Received 20 mEq oral potassium supplementation, on daily magnesium supplementation  Telemetry with 2.6 sec pause, 10 beat run of V-tach  Continue Eliquis for anticoagulation  Will discuss with Cardiology if CHARLES tomorrow given now able to lie flat

## 2020-02-13 NOTE — PLAN OF CARE
02/13/20 1445   Patient Assessment/Suction   Level of Consciousness (AVPU) alert   Respiratory Effort Normal;Unlabored   Expansion/Accessory Muscles/Retractions no use of accessory muscles;no retractions   All Lung Fields Breath Sounds clear   Rhythm/Pattern, Respiratory unlabored;pattern regular;depth regular   PRE-TX-O2   O2 Device (Oxygen Therapy) CPAP   $ Is the patient on Low Flow Oxygen? Yes   Oxygen Concentration (%) 28   SpO2 96 %   Pulse Oximetry Type Continuous   $ Pulse Oximetry - Multiple Charge Pulse Oximetry - Multiple   Pulse 76   Resp 14   Preset CPAP/BiPAP Settings   Mode Of Delivery CPAP   $ CPAP/BiPAP Daily Charge BiPAP/CPAP Daily   $ Is patient using? Yes   Size of Mask Large   Sized Appropriately? Yes   Equipment Type V60   CPAP (cm H2O) 10   Patient CPAP/BiPAP Settings   RR Total (Breaths/Min) 18   Tidal Volume (mL) 689   VE Minute Ventilation (L/min) 9.7 L/min   Peak Inspiratory Pressure (cm H2O) 10   TiTOT (%) 26   Total Leak (L/Min) 8   Patient Trigger - ST Mode Only (%) 100

## 2020-02-13 NOTE — ANESTHESIA PREPROCEDURE EVALUATION
02/13/2020  Jarret Jessica is a 54 y.o., male.    Patient Active Problem List   Diagnosis    Essential hypertension, benign    Atrial fibrillation with RVR    Elevated troponin    Hyperglycemia    Newly diagnosed diabetes    Constipation    Acute congestive heart failure    BRI (obstructive sleep apnea)    NSTEMI (non-ST elevated myocardial infarction)    Tobacco abuse    Acute on chronic systolic congestive heart failure    Persistent atrial fibrillation    Essential hypertension, uncontrolled    Nonischemic cardiomyopathy    Type 2 diabetes mellitus    Class 2 obesity in adult    Noncompliance with financial restrictions    JANEL (acute kidney injury)       Past Surgical History:   Procedure Laterality Date    ANGIOGRAM, CORONARY, WITH LEFT HEART CATHETERIZATION N/A 12/30/2019    Procedure: Angiogram, Coronary, with Left Heart Cath;  Surgeon: Gasper Person MD;  Location: [facility] CATH/EP LAB;  Service: Cardiology;  Laterality: N/A;        Tobacco Use:  The patient  reports that he has been smoking cigarettes. He has a 45.00 pack-year smoking history. He has never used smokeless tobacco.     Results for orders placed or performed during the hospital encounter of 02/12/20   EKG 12-lead    Collection Time: 02/12/20  9:38 AM    Narrative    Test Reason : R06.02,    Vent. Rate : 117 BPM     Atrial Rate : 119 BPM     P-R Int : 000 ms          QRS Dur : 102 ms      QT Int : 306 ms       P-R-T Axes : 000 023 109 degrees     QTc Int : 426 ms    Atrial fibrillation with rapid ventricular response  Septal infarct ,age undetermined  Abnormal ECG  When compared with ECG of 30-DEC-2019 06:04,  Vent. rate has increased BY  56 BPM  Nonspecific T wave abnormality now evident in Inferior leads    Referred By: AAAREFERR   SELF           Confirmed By:       Transthoracic Echo:    · Moderate concentric  left ventricular hypertrophy.  · Mild left ventricular enlargement.  · Severely decreased left ventricular systolic function. The estimated ejection fraction is 25%  · Severe global hypokinetic wall motion.  · Atrial fibrillation observed with restrictive filling pattern present.  · Low normal right ventricular systolic function.  · Mild left atrial enlargement.  · Intermediate central venous pressure (8 mm Hg).  · The estimated PA systolic pressure is 35 mm Hg  · The aortic root is mildly dilated.    Imaging Results          X-Ray Chest AP Portable (Final result)  Result time 02/12/20 10:09:14    Final result by Zuhair Cruz MD (02/12/20 10:09:14)                 Impression:      Enlarged cardiomediastinal silhouette, peribronchial cuffing, and bilateral perihilar interstitial opacities is consistent with CHF and pulmonary edema.      Electronically signed by: Zuhair Cruz MD  Date:    02/12/2020  Time:    10:09             Narrative:    EXAMINATION:  XR CHEST AP PORTABLE    CLINICAL HISTORY:  CHF;    FINDINGS:  Portable chest with comparison dated 12/29/2019.  The cardiomediastinal silhouette is enlarged.  There is prominence of the pulmonary hilar vascular structures with bilateral peribronchial cuffing.Bilateral perihilar interstitial opacities demonstrated, worst at the lung bases.  No acute osseous abnormality.                                 Lab Results   Component Value Date    WBC 5.67 02/13/2020    HGB 12.9 (L) 02/13/2020    HCT 40.8 02/13/2020    MCV 83 02/13/2020     02/13/2020     BMP  Lab Results   Component Value Date     02/13/2020    K 3.6 02/13/2020     02/13/2020    CO2 28 02/13/2020    BUN 19 02/13/2020    CREATININE 1.5 (H) 02/13/2020    CALCIUM 8.3 (L) 02/13/2020    ANIONGAP 7 (L) 02/13/2020    ESTGFRAFRICA 59.3 (A) 02/13/2020    EGFRNONAA 51.3 (A) 02/13/2020             Anesthesia Evaluation    I have reviewed the Patient Summary Reports.     I have reviewed the  Medications.     Review of Systems  Anesthesia Hx:  No problems with previous Anesthesia Denies Hx of Anesthetic complications  Denies Family Hx of Anesthesia complications.   Denies Personal Hx of Anesthesia complications.   Social:  Smoker    Hematology/Oncology:  Hematology Normal   Oncology Normal     EENT/Dental:EENT/Dental Normal   Cardiovascular:   Hypertension CHF    Pulmonary:   Sleep Apnea    Renal/:   Chronic Renal Disease, ARF    Hepatic/GI:  Hepatic/GI Normal    Musculoskeletal:  Musculoskeletal Normal    Neurological:  Neurology Normal    Endocrine:   Diabetes    Psych:  Psychiatric Normal           Physical Exam  General:  Well nourished, Obesity    Airway/Jaw/Neck:  Airway Findings: Mouth Opening: Normal Tongue: Normal  General Airway Assessment: Adult  Mallampati: II  TM Distance: Normal, at least 6 cm  Jaw/Neck Findings:  Neck ROM: Normal ROM      Dental:  Dental Findings:   Chest/Lungs:  Chest/Lungs Findings: Clear to auscultation, Normal Respiratory Rate     Heart/Vascular:  Heart Findings: Rate: Tachycardia  Rhythm: Irregularly Irregular        Mental Status:  Mental Status Findings:  Alert and Oriented         Anesthesia Plan  Type of Anesthesia, risks & benefits discussed:  Anesthesia Type:  MAC  Patient's Preference:   Intra-op Monitoring Plan: standard ASA monitors  Intra-op Monitoring Plan Comments:   Post Op Pain Control Plan:   Post Op Pain Control Plan Comments:   Induction:    Beta Blocker:         Informed Consent: Patient understands risks and agrees with Anesthesia plan.  Questions answered. Anesthesia consent signed with patient.  ASA Score: 3     Day of Surgery Review of History & Physical:            Ready For Surgery From Anesthesia Perspective.

## 2020-02-13 NOTE — SUBJECTIVE & OBJECTIVE
Interval History:  Patient's partner is present at bedside.  Patient reports he had a bad night, little sleep, he was irritated due to frequent interruptions, did receive benzodiazepine however not effective, sleeping this morning.  Ate 100% of his breakfast.  Does report interval improvement in shortness of breath as well as lower extremity edema. Shortness of breath is now mild to moderate, intermittent, no associated chest pain or palpitation.  During my encounter I did lie the patient flat and he did not have orthopnea, tolerated lying flat.  As per nursing high suspicion of sleep apnea as he did desaturate, he has been tolerating CPAP as ordered.  Blood pressure is much better today.  Telemetry with rate controlled atrial fibrillation, did have 2.6 sec pause, 10 need run of VT as per review. Labs with potassium 3.6, magnesium 1.9, creatinine 1.5, urine drug screen negative.  -3 L since admission.  Discussed with nursing.  Patient updated about plan of care.    Review of Systems   Constitutional: Negative for chills and fever.   Respiratory: Positive for shortness of breath (Improved).    Cardiovascular: Positive for leg swelling (Improved). Negative for chest pain and palpitations.   Gastrointestinal: Negative for abdominal pain, nausea and vomiting.   Genitourinary: Negative for difficulty urinating.   Hematological: Does not bruise/bleed easily.   Psychiatric/Behavioral: Positive for agitation and sleep disturbance.     Objective:     Vital Signs (Most Recent):  Temp: 97.6 °F (36.4 °C) (02/13/20 0718)  Pulse: 80 (02/13/20 0718)  Resp: (!) 21 (02/13/20 0718)  BP: 138/84 (02/13/20 0718)  SpO2: (!) 93 % (02/13/20 0718) Vital Signs (24h Range):  Temp:  [97.6 °F (36.4 °C)-98.2 °F (36.8 °C)] 97.6 °F (36.4 °C)  Pulse:  [] 80  Resp:  [14-44] 21  SpO2:  [86 %-98 %] 93 %  BP: (127-180)/() 138/84     Weight: 117 kg (257 lb 15 oz)  Body mass index is 35.98 kg/m².    Intake/Output Summary (Last 24 hours) at  2/13/2020 1103  Last data filed at 2/13/2020 0839  Gross per 24 hour   Intake 605.67 ml   Output 3480 ml   Net -2874.33 ml      Physical Exam   Constitutional: He is oriented to person, place, and time. He appears well-developed and well-nourished. No distress.   Obese male, sleeping on CPAP initially on entering the room, easily awakened, no apparent distress, cooperative   HENT:   Head: Normocephalic and atraumatic.   Dental caries present   Eyes: Right eye exhibits no discharge. Left eye exhibits no discharge.   Cardiovascular: Intact distal pulses.   Irregular, rate normal, 1+ lower extremity edema   Pulmonary/Chest: No stridor. No respiratory distress. He has no wheezes.   Wearing CPAP currently.  Left basilar inspiratory crackles.   Abdominal: Soft. Bowel sounds are normal. He exhibits no distension. There is no tenderness. There is no rebound and no guarding.   Genitourinary:   Genitourinary Comments: No Lyon present   Musculoskeletal: He exhibits no edema.   Neurological: He is alert and oriented to person, place, and time.   Skin: Skin is warm and dry. He is not diaphoretic.   Psychiatric:   Cooperative   Nursing note and vitals reviewed.      Significant Labs:   Bilirubin:   Recent Labs   Lab 02/12/20  1000   BILITOT 1.0     BMP:   Recent Labs   Lab 02/13/20  0453   *      K 3.6      CO2 28   BUN 19   CREATININE 1.5*   CALCIUM 8.3*   MG 1.9     CBC:   Recent Labs   Lab 02/12/20  1000 02/13/20  0453   WBC 5.34 5.67   HGB 13.3* 12.9*   HCT 42.3 40.8    187     Cardiac Markers:   Recent Labs   Lab 02/12/20  1000   BNP 1,289*     Lactic Acid: No results for input(s): LACTATE in the last 48 hours.  Magnesium:   Recent Labs   Lab 02/12/20  1000 02/13/20  0453   MG 1.9 1.9     POCT Glucose: No results for input(s): POCTGLUCOSE in the last 48 hours.  Troponin:   Recent Labs   Lab 02/12/20  1000   TROPONINI 0.192*     TSH:   Recent Labs   Lab 12/29/19  0739   TSH 3.440     All pertinent  labs within the past 24 hours have been reviewed.    Significant Imaging: I have reviewed all pertinent imaging results/findings within the past 24 hours.

## 2020-02-13 NOTE — HOSPITAL COURSE
Patient was admitted to the hospital with atrial fibrillation with RVR, started on amiodarone infusion.  Also acute on chronic systolic heart failure started on IV diuresis.  Cardiology consulted.  On 02/13 improvement in symptomatology, able to lie flat, rate controlled atrial fibrillation on IV amiodarone.  Cardiology planning possible DC cardioversion. On 2/14/20 he underwent CHARLES which demonstrated left atrial clot, likely secondary to patient not being fully compliant with Eliquis, does not represent failure of anticoagulation, on able to be cardioverted due to same.  Given these findings amiodarone was discontinued and metoprolol dose was increased.  High suspicion patient has undiagnosed sleep apnea which is contributing to current cardiac comorbidities also affecting sleep, lack of insurance is limiting factor however starting process of private pay if patient can afford.  Prescriptions were sent to pharmacy with cost obtained and patient expressed that he would be able to afford these medications.  Heart failure compensated on discharge. CHF education was reinforced, signs and symptoms discussed, patient and mother expressed understanding.  Work note provided for patient.  Discharge plan including medication changes follow-up as well as return precautions explained to the patient his mother present at bedside.    Discharge examination  Lying in bed, apparent distress, cooperative  Alert and oriented times she  Irregular heart rhythm rate controlled  Not on supplemental oxygen  Near resolved lower extremity edema

## 2020-02-13 NOTE — CONSULTS
[facility]  Cardiology  Consult Note    Patient Name: Jarret Jessica  MRN: [id number]  Admission Date: 2/12/2020  Hospital Length of Stay: 0 days  Code Status: Full Code   Attending Provider: Keila Berrios MD   Consulting Provider: Micaela Chow NP  Primary Care Physician: Primary Doctor No  Principal Problem:Acute on chronic systolic congestive heart failure    Patient information was obtained from patient and ER records.     Inpatient consult to Cardiology  Consult performed by: Gasper Person MD  Consult ordered by: Keila Berrios MD        Subjective:     REASON FOR CONSULT:  chf     HPI: Mr. Jessica is a 54 year old male with past medical history significant for nonischemic cardiomyopathy, hypertension, atrial fibrillation, diabetes presented for complaint of shortness of breath and leg swelling worsening over the past 2-3 days.  Patient with recent [facility] hospitalization with new onset atrial fibrillation, elevated troponins with new diagnosis of systolic heart failure. Patient had a coronary angiogram which revealed non obstructive CAD. He was recently seen in our office last month and was doing better at that time.   He reports that 3 days ago, he started having some abdominal discomfort associated with constipation. He decided to stop taking his medications because he thought they could be causing him to feel bad. He then started noticing increase in lower extremity edema and shortness of breath. Reports the last 2 nights he could not sleep because of difficulty breathing and unable to lie flat on the bed so he decided to come to the ER. He denies having any chest pain. Reports yesterday he felt sensation of heart fluttering. Denies syncopal episodes. Denies any blood in the urine or stool. Denies fever or chills. Does report dry cough.   In the ED, he was noted to have atrial fibrillation with RVR. He was started on a Cardizem drip which was subsequently changed to amiodarone drip  due to poor LV function. BNP elevated to 1289. CXR with evidence of pulmonary edema. He has received Lasix 40 mg IV and remains on amiodarone drip. Presently he states the shortness of breath has improved.     Past Medical History:   Diagnosis Date    CHF (congestive heart failure)     Diabetes mellitus     Diverticulitis 2010    Hypertension     Nicotine abuse     Obesity        Past Surgical History:   Procedure Laterality Date    ANGIOGRAM, CORONARY, WITH LEFT HEART CATHETERIZATION N/A 12/30/2019    Procedure: Angiogram, Coronary, with Left Heart Cath;  Surgeon: Gasper Person MD;  Location: [facility] CATH/EP LAB;  Service: Cardiology;  Laterality: N/A;       Review of patient's allergies indicates:  No Known Allergies    No current facility-administered medications on file prior to encounter.      Current Outpatient Medications on File Prior to Encounter   Medication Sig    cloNIDine (CATAPRES) 0.1 MG tablet Take 1 tablet (0.1 mg total) by mouth every 6 (six) hours as needed (180).    magnesium oxide 500 mg Cap Take 500 mg by mouth once daily.    alcohol swabs (BD ALCOHOL SWABS) PadM Use as directed    apixaban (ELIQUIS) 5 mg Tab Take 1 tablet (5 mg total) by mouth 2 (two) times daily.    blood sugar diagnostic (TRUE METRIX GLUCOSE TEST STRIP) Strp use as directed to test blood sugar    eplerenone (INSPRA) 25 MG Tab Take 1 tablet (25 mg total) by mouth once daily.    furosemide (LASIX) 40 MG tablet Take 1 tablet (40 mg total) by mouth once daily.    lancets (TRUEPLUS LANCETS) 30 gauge Misc Use as directed to test blood sugar    lisinopril (PRINIVIL,ZESTRIL) 20 MG tablet Take 1 tablet (20 mg total) by mouth 2 (two) times daily.    metFORMIN (GLUCOPHAGE) 500 MG tablet Take 500 mg by mouth 2 (two) times daily.     metoprolol succinate (TOPROL-XL) 50 MG 24 hr tablet Take 1 tablet (50 mg total) by mouth 2 (two) times daily.    [DISCONTINUED] blood-glucose meter (TRUE METRIX GLUCOSE METER) Misc  use as directed    [DISCONTINUED] insulin detemir U-100 (LEVEMIR FLEXTOUCH) 100 unit/mL (3 mL) SubQ InPn pen Inject 10 Units into the skin every evening.    [DISCONTINUED] magnesium oxide (MAG-OX) 400 mg (241.3 mg magnesium) tablet Take 2 tablets (800 mg total) by mouth as needed (if magnesium level is 1.5-1.8 mg/dL give 800 mg every 4 hours x 2 doses).       Scheduled Meds:   apixaban  5 mg Oral BID    eplerenone  25 mg Oral Daily    furosemide (LASIX) IV  40 mg Intravenous Q12H    insulin detemir U-100  10 Units Subcutaneous QHS    lisinopril  20 mg Oral BID    magnesium oxide  400 mg Oral Daily    metoprolol succinate  50 mg Oral BID    [START ON 2/13/2020] polyethylene glycol  17 g Oral Daily     Continuous Infusions:   amiodarone in dextrose 5% 1 mg/min (02/12/20 1348)    amiodarone in dextrose 5%       PRN Meds:.acetaminophen, dextrose 50%, dextrose 50%, insulin regular, ondansetron, sodium chloride 0.9%    Family History     Problem Relation (Age of Onset)    Heart disease Father, Mother    Hypertension Mother          Tobacco Use    Smoking status: Current Every Day Smoker     Packs/day: 1.50     Years: 30.00     Pack years: 45.00     Types: Cigarettes    Smokeless tobacco: Never Used    Tobacco comment: States he has recently cut down   Substance and Sexual Activity    Alcohol use: Not Currently     Comment: Denies any current alcohol use    Drug use: No    Sexual activity: Not Currently       ROS   No significant headaches or sore throat or runny nose.   No recent changes in vision.   No recent changes in hearing.  No dysphagia or odynophagia.  Denies chest pain. Reports shortness of breath.   Denies  hemoptysis. Reports dry cough.   Denies any abdominal pain, nausea, vomiting, diarrhea. Reports constipation.    Denies any dysuria or polyuria.   Denies any fevers or chills.   Denies any recent significant weight changes.   Denies bleeding diathesis    Objective:     Vital Signs (Most  Recent):  Temp: 98 °F (36.7 °C) (02/12/20 1729)  Pulse: 90 (02/12/20 1729)  Resp: (!) 32 (02/12/20 1729)  BP: (!) 167/91 (02/12/20 1729)  SpO2: 97 % (02/12/20 1729) Vital Signs (24h Range):  Temp:  [97.9 °F (36.6 °C)-98.2 °F (36.8 °C)] 98 °F (36.7 °C)  Pulse:  [] 90  Resp:  [14-44] 32  SpO2:  [86 %-98 %] 97 %  BP: (131-202)/() 167/91     Weight: 116.6 kg (257 lb)  Body mass index is 35.84 kg/m².    SpO2: 97 %  O2 Device (Oxygen Therapy): nasal cannula      Intake/Output Summary (Last 24 hours) at 2/12/2020 1813  Last data filed at 2/12/2020 1638  Gross per 24 hour   Intake 365.67 ml   Output 1830 ml   Net -1464.33 ml       Lines/Drains/Airways     Peripheral Intravenous Line                 Peripheral IV - Single Lumen 02/12/20 1000 20 G Right Forearm less than 1 day                Physical Exam  HEENT: Normocephalic, atraumatic, PERRL, Conjunctiva pink, no scleral icterus.   CVS: S1S2+, Irregular, no murmurs, rubs or gallops, JVP: Elevated.   LUNGS: Crackles bibasilar.   ABDOMEN: Soft, NT, BS+  EXTREMITIES: No cyanosis, clubbing . 1 + edema BLE  NEURO: AAO X 3.       Significant Labs:   ABG: No results for input(s): PH, PCO2, HCO3, POCSATURATED, BE in the last 48 hours., Blood Culture: No results for input(s): LABBLOO in the last 48 hours., BMP:   Recent Labs   Lab 02/12/20  1000   *      K 4.1      CO2 26   BUN 17   CREATININE 1.3   CALCIUM 8.8   MG 1.9   , CMP   Recent Labs   Lab 02/12/20  1000      K 4.1      CO2 26   *   BUN 17   CREATININE 1.3   CALCIUM 8.8   PROT 6.0   ALBUMIN 3.3*   BILITOT 1.0   ALKPHOS 46*   AST 22   ALT 33   ANIONGAP 10   ESTGFRAFRICA >60.0   EGFRNONAA >60.0   , CBC   Recent Labs   Lab 02/12/20  1000   WBC 5.34   HGB 13.3*   HCT 42.3      , INR   Recent Labs   Lab 02/12/20  1000   INR 1.2   , Lipid Panel No results for input(s): CHOL, HDL, LDLCALC, TRIG, CHOLHDL in the last 48 hours.,   Pathology Results  (Last 10 years)                10/17/12 0000  Tissue Specimen to Pathology Final result      , Troponin   Recent Labs   Lab 02/12/20  1000   TROPONINI 0.192*    and All pertinent lab results from the last 24 hours have been reviewed.    Significant Imaging: X-Ray: CXR: X-Ray Chest 1 View (CXR): No results found for this visit on 02/12/20.  XR CHEST AP PORTABLE    CLINICAL HISTORY:  CHF;    FINDINGS:  Portable chest with comparison dated 12/29/2019.  The cardiomediastinal silhouette is enlarged.  There is prominence of the pulmonary hilar vascular structures with bilateral peribronchial cuffing.Bilateral perihilar interstitial opacities demonstrated, worst at the lung bases.  No acute osseous abnormality.      Impression       Enlarged cardiomediastinal silhouette, peribronchial cuffing, and bilateral perihilar interstitial opacities is consistent with CHF and pulmonary edema.       Assessment and Plan:     IMPRESSION:    Congestive heart failure.  Acute on chronic. Reduced LV systolic function of 25%.  Initial diagnosis 12/29/2019.  Status post left heart catheterization with normal coronaries on 12/30/2019.   Nonischemic cardiomyopathy.  Likely etiologies poorly controlled severe hypertension.  Patient cannot afford LifeVest and currently does not have insurance.  Atrial fibrillation.  Persistent.  Currently rate controlled on amiodarone drip.   Elevated troponin. Likely secondary to atrial fibrillation vs acute on chronic heart failure   Diabetes mellitus.  Patient reportedly could not afford Levemir and is not currently taking. Started on metformin.   Hypertension  Tobacco use.     RECOMMENDATIONS:  1. Continue lasix as ordered  2. Continue amiodarone drip per protocol.   3. Accurate intake and outputs.   4. CHF education discussed including foods to avoid and signs and symptoms to look out for. Medication compliance was discussed.   5. Continue Eliquis, eplerenone, metoprolol, and lisinopril.  6. Consider CHARLES/DCCV after diuresis and volume  status is optimized.      Thank you for your consult. I will follow-up with patient. Please contact us if you have any additional questions.    Micaela Chow NP  Cardiology   [facility]    I have personally seen and examined the patient. I reviewed the notes, assessments, and/or procedures performed by Ms Micaela Chow, I concur with her documentation of Jarret Jessica.

## 2020-02-13 NOTE — ASSESSMENT & PLAN NOTE
Patient with recent admission with acute systolic heart failure requiring IV Lasix infusion.  Echocardiogram at that time with EF of 25% with severe LV dysfunction/hypokinesia.  Has been diuresing well, now able to lie flat, net negative near 3 L. Does now have new acute kidney injury.  Decreased IV Lasix to once daily  Daily BMP while on IV Lasix  Strict I/O, daily weights, oral fluid and sodium restriction  Patient needs continue reinforced CHF education  Appreciate Cardiology input

## 2020-02-13 NOTE — PROGRESS NOTES
[facility]  Cardiology  Progress Note    Patient Name: Jarret Jessica  MRN: [id number]  Admission Date: 2/12/2020  Hospital Length of Stay: 1 days  Code Status: Full Code   Attending Physician: Keila Berrios MD   Primary Care Physician: Primary Doctor No  Expected Discharge Date:   Principal Problem:Acute on chronic systolic congestive heart failure    Subjective:       Interval History:  Denies any chest pain.  Shortness of breath is better.  Patient has been put on CPAP and is resting better.    ROS   Denies any focal weakness.  Denies any dizziness.  Denies any bleeding issues.  Objective:     Vital Signs (Most Recent):  Temp: 98 °F (36.7 °C) (02/13/20 1142)  Pulse: 74 (02/13/20 1142)  Resp: 17 (02/13/20 1142)  BP: 131/80 (02/13/20 1142)  SpO2: (!) 91 % (02/13/20 1142) Vital Signs (24h Range):  Temp:  [97.6 °F (36.4 °C)-98.2 °F (36.8 °C)] 98 °F (36.7 °C)  Pulse:  [] 74  Resp:  [17-44] 17  SpO2:  [88 %-97 %] 91 %  BP: (127-167)/(80-95) 131/80     Weight: 117 kg (257 lb 15 oz)  Body mass index is 35.98 kg/m².    SpO2: (!) 91 %  O2 Device (Oxygen Therapy): BiPAP      Intake/Output Summary (Last 24 hours) at 2/13/2020 1533  Last data filed at 2/13/2020 0839  Gross per 24 hour   Intake 240 ml   Output 2300 ml   Net -2060 ml       Lines/Drains/Airways     Peripheral Intravenous Line                 Peripheral IV - Single Lumen 02/12/20 1000 20 G Right Forearm 1 day                Scheduled Meds:   apixaban  5 mg Oral BID    eplerenone  25 mg Oral Daily    [START ON 2/14/2020] furosemide (LASIX) IV  40 mg Intravenous Daily    insulin detemir U-100  10 Units Subcutaneous QHS    lisinopril  20 mg Oral BID    magnesium oxide  400 mg Oral Daily    metoprolol succinate  50 mg Oral BID    polyethylene glycol  17 g Oral Daily     Continuous Infusions:   amiodarone in dextrose 5% 0.5 mg/min (02/13/20 0719)     PRN Meds:.acetaminophen, ALPRAZolam, calcium chloride IVPB, calcium chloride IVPB,  calcium chloride IVPB, dextrose 50%, dextrose 50%, insulin regular, magnesium oxide, magnesium sulfate IVPB, magnesium sulfate IVPB, magnesium sulfate IVPB, magnesium sulfate IVPB, ondansetron, potassium chloride in water, potassium chloride in water, potassium chloride in water, potassium chloride in water, potassium chloride, potassium chloride, potassium chloride, potassium chloride, sodium chloride 0.9%, sodium phosphate IVPB, sodium phosphate IVPB, sodium phosphate IVPB, sodium phosphate IVPB, sodium phosphate IVPB     Physical Exam  HEENT: Normocephalic, atraumatic, PERRL, Conjunctiva pink, no scleral icterus.   CVS: S1S2+, irregular, no murmurs, rubs or gallops, JVP: Normal.  LUNGS: Clear  ABDOMEN: Soft, NT, BS+  EXTREMITIES: No cyanosis, clubbing or edema  NEURO: AAO X 3.       Significant Labs:   BMP:   Recent Labs   Lab 02/12/20  1000 02/13/20  0453   * 155*    139   K 4.1 3.6    104   CO2 26 28   BUN 17 19   CREATININE 1.3 1.5*   CALCIUM 8.8 8.3*   MG 1.9 1.9   , CMP   Recent Labs   Lab 02/12/20  1000 02/13/20  0453    139   K 4.1 3.6    104   CO2 26 28   * 155*   BUN 17 19   CREATININE 1.3 1.5*   CALCIUM 8.8 8.3*   PROT 6.0  --    ALBUMIN 3.3*  --    BILITOT 1.0  --    ALKPHOS 46*  --    AST 22  --    ALT 33  --    ANIONGAP 10 7*   ESTGFRAFRICA >60.0 59.3*   EGFRNONAA >60.0 51.3*   , CBC   Recent Labs   Lab 02/12/20  1000 02/13/20  0453   WBC 5.34 5.67   HGB 13.3* 12.9*   HCT 42.3 40.8    187   , INR   Recent Labs   Lab 02/12/20  1000   INR 1.2   , Lipid Panel No results for input(s): CHOL, HDL, LDLCALC, TRIG, CHOLHDL in the last 48 hours. and Troponin   Recent Labs   Lab 02/12/20  1000   TROPONINI 0.192*       Significant Imaging:   Assessment and Plan:     IMPRESSION:  Congestive heart failure.  Acute on chronic. Reduced LV systolic function of 25%.  Initial diagnosis 12/29/2019.     Nonischemic cardiomyopathy. Status post left heart catheterization with  normal coronaries on 12/30/2019. Likely etiology is poorly controlled severe hypertension.  Patient cannot afford LifeVest and currently does not have insurance.  Atrial fibrillation.  Persistent.  Currently rate controlled on amiodarone drip.   Elevated troponin. Likely secondary to atrial fibrillation vs acute on chronic heart failure   Diabetes mellitus.  Patient reportedly could not afford Levemir and is not currently taking. Started on metformin.   Hypertension  Tobacco use.   Possible obstructive sleep apnea.      PLAN:  1.  Continue current medical regimen.  2.  Plan for CHARLES/cardioversion in the morning.  Indications, risks, benefits as well as alternatives to the procedure were discussed with the patient and informed consent would be obtained.    Note:  I discussed the patient's clinical status with his sister who reportedly works in the cath lab in [place].  Her name is Bryanna Jessica.  After discussing patient's condition, she asked if an electrophysiologist was consulted on the case.  I informed the sister that we do not have a electrophysiology team in this hospital.  At that point in time she thought that it is negligence of care for the patient.  I explained to the sister that patient's care was never compromised and I did not see any negligence in his care.  I even explained to her that the patient despite not having insurance I saw the patient in the clinic and even took care of his diabetes in the clinic.  I told her that if she is not happy with my care that she could find a cardiologist for her brother.    This conversation happened in the patient's room and it was witnessed by the patient as well as his mother and another family member.  After my conversation with the patient's sister I asked the patient if he was uncomfortable with me and I would be happy to call in a different cardiology group.  At that point he stated that he would like for me to be his cardiologist and he did not want to  change cardiologist.  I explained to him that I would not be able to discuss his situation with his sister on the phone and he was fine with that decision.     At the patient and the patient's sister's request I discussed the case with Dr. King who is reportedly an electrophysiologist in [place].      Gasper Person MD  Cardiology  [facility]

## 2020-02-13 NOTE — PROGRESS NOTES
[facility]  Progress Note    Patient Name: Jarret Jessica  MRN: [id number]  Patient Class: IP- Inpatient   Admission Date: 2/12/2020  Length of Stay: 1 days  Attending Physician: Keila Berrios MD  Primary Care Provider: Primary Doctor No        Subjective:     Principal Problem:Acute on chronic systolic congestive heart failure        HPI:  Mr. Jessica is a 54-year-old male who presented to the ED with chief complaint of shortness of breath.  Patient with recent [facility] admission as transfer from [facility] from 12/29/19-1/3/20 with new onset atrial fibrillation with RVR, during that admission was found to have elevated troponin with echocardiogram revealing severely reduced EF at 25%, left heart catheterization with nonobstructive coronary artery disease, acute systolic heart failure needing IV Lasix drip, persistent atrial fibrillation.  Patient states he did follow-up 2 weeks post hospital discharge, only changes made were discontinuation of insulin as this was too expensive for the patient to afford and initiation of metformin.  He now presents with 3 days history of progressively worsening shortness of breath, location bilateral chest, associated with intermittent cough productive of clear phlegm, severe bilateral lower extremity swelling as well as orthopnea.  He states he was not able to sleep over the last 3 days, unable to lie flat, not able to get comfortable, he thought initially this was an adverse effect of the medications he was recently placed on and therefore he self discontinued all medications including Lasix over the last 2 days.  He does not consistently check his blood pressure at home.  Has been checking his blood glucose and as per relative at bedside has been running consistently less than 200.  Has not been tracking his weight, unsure about dietary restrictions.  Patient and relative reviewed heart failure educational booklet yesterday and noted  symptoms with leg edema were concerning for heart failure and therefore seek medical attention today. In the ED he was tachycardic, atrial fibrillation, , hypertensive, /96.  Labs as per chart review WBC 5.34, H/H 13.3/42.3, platelet 194, sodium 140, potassium 4.1, BUN 17, creatinine 1.3, BNP 1 289, troponin 0.192.  Glucose 155.  Chest x-ray with cardiomegaly with pulmonary edema.  EKG atrial fibrillation with RVR with heart rate 177.  He received diltiazem 10 mg IV bolus followed by initiation of drip, aspirin 325 mg, Lasix 40 mg IV as well as topical nitro paste.  During my encounter patient had urinated multiple times.  Case discussed with ED provider.  Did also communicated with patient's cardiologist, given EF of 25%, relative contraindication to calcium channel blocker and therefore will switch to amiodarone infusion as per protocol.    Overview/Hospital Course:  Patient was admitted to the hospital with atrial fibrillation with RVR, started on amiodarone infusion.  Also acute on chronic systolic heart failure started on IV diuresis.  Cardiology consulted.  On 02/13 improvement in symptomatology, able to lie flat, rate controlled atrial fibrillation on IV amiodarone.  Cardiology planning possible DC cardioversion.    Interval History:  Patient's partner is present at bedside.  Patient reports he had a bad night, little sleep, he was irritated due to frequent interruptions, did receive benzodiazepine however not effective, sleeping this morning.  Ate 100% of his breakfast.  Does report interval improvement in shortness of breath as well as lower extremity edema. Shortness of breath is now mild to moderate, intermittent, no associated chest pain or palpitation.  During my encounter I did lie the patient flat and he did not have orthopnea, tolerated lying flat.  As per nursing high suspicion of sleep apnea as he did desaturate, he has been tolerating CPAP as ordered.  Blood pressure is much better  today.  Telemetry with rate controlled atrial fibrillation, did have 2.6 sec pause, 10 need run of VT as per review. Labs with potassium 3.6, magnesium 1.9, creatinine 1.5, urine drug screen negative.  -3 L since admission.  Discussed with nursing.  Patient updated about plan of care.    Review of Systems   Constitutional: Negative for chills and fever.   Respiratory: Positive for shortness of breath (Improved).    Cardiovascular: Positive for leg swelling (Improved). Negative for chest pain and palpitations.   Gastrointestinal: Negative for abdominal pain, nausea and vomiting.   Genitourinary: Negative for difficulty urinating.   Hematological: Does not bruise/bleed easily.   Psychiatric/Behavioral: Positive for agitation and sleep disturbance.     Objective:     Vital Signs (Most Recent):  Temp: 97.6 °F (36.4 °C) (02/13/20 0718)  Pulse: 80 (02/13/20 0718)  Resp: (!) 21 (02/13/20 0718)  BP: 138/84 (02/13/20 0718)  SpO2: (!) 93 % (02/13/20 0718) Vital Signs (24h Range):  Temp:  [97.6 °F (36.4 °C)-98.2 °F (36.8 °C)] 97.6 °F (36.4 °C)  Pulse:  [] 80  Resp:  [14-44] 21  SpO2:  [86 %-98 %] 93 %  BP: (127-180)/() 138/84     Weight: 117 kg (257 lb 15 oz)  Body mass index is 35.98 kg/m².    Intake/Output Summary (Last 24 hours) at 2/13/2020 1103  Last data filed at 2/13/2020 0839  Gross per 24 hour   Intake 605.67 ml   Output 3480 ml   Net -2874.33 ml      Physical Exam   Constitutional: He is oriented to person, place, and time. He appears well-developed and well-nourished. No distress.   Obese male, sleeping on CPAP initially on entering the room, easily awakened, no apparent distress, cooperative   HENT:   Head: Normocephalic and atraumatic.   Dental caries present   Eyes: Right eye exhibits no discharge. Left eye exhibits no discharge.   Cardiovascular: Intact distal pulses.   Irregular, rate normal, 1+ lower extremity edema   Pulmonary/Chest: No stridor. No respiratory distress. He has no wheezes.    Wearing CPAP currently.  Left basilar inspiratory crackles.   Abdominal: Soft. Bowel sounds are normal. He exhibits no distension. There is no tenderness. There is no rebound and no guarding.   Genitourinary:   Genitourinary Comments: No Lyon present   Musculoskeletal: He exhibits no edema.   Neurological: He is alert and oriented to person, place, and time.   Skin: Skin is warm and dry. He is not diaphoretic.   Psychiatric:   Cooperative   Nursing note and vitals reviewed.      Significant Labs:   Bilirubin:   Recent Labs   Lab 02/12/20  1000   BILITOT 1.0     BMP:   Recent Labs   Lab 02/13/20  0453   *      K 3.6      CO2 28   BUN 19   CREATININE 1.5*   CALCIUM 8.3*   MG 1.9     CBC:   Recent Labs   Lab 02/12/20  1000 02/13/20  0453   WBC 5.34 5.67   HGB 13.3* 12.9*   HCT 42.3 40.8    187     Cardiac Markers:   Recent Labs   Lab 02/12/20  1000   BNP 1,289*     Lactic Acid: No results for input(s): LACTATE in the last 48 hours.  Magnesium:   Recent Labs   Lab 02/12/20  1000 02/13/20  0453   MG 1.9 1.9     POCT Glucose: No results for input(s): POCTGLUCOSE in the last 48 hours.  Troponin:   Recent Labs   Lab 02/12/20  1000   TROPONINI 0.192*     TSH:   Recent Labs   Lab 12/29/19  0739   TSH 3.440     All pertinent labs within the past 24 hours have been reviewed.    Significant Imaging: I have reviewed all pertinent imaging results/findings within the past 24 hours.      Assessment/Plan:      * Acute on chronic systolic congestive heart failure  Patient with recent admission with acute systolic heart failure requiring IV Lasix infusion.  Echocardiogram at that time with EF of 25% with severe LV dysfunction/hypokinesia.  Has been diuresing well, now able to lie flat, net negative near 3 L. Does now have new acute kidney injury.  Decreased IV Lasix to once daily  Daily BMP while on IV Lasix  Strict I/O, daily weights, oral fluid and sodium restriction  Patient needs continue reinforced CHF  education  Appreciate Cardiology input      Persistent atrial fibrillation  New onset atrial fibrillation during last hospitalized stay  Permanent atrial fibrillation, he was not cardioverted, now with RVR likely secondary to HF and noncompliance with medication.  Given EF of 25%. Patient had bradycardia with digoxin previously  Complete amiodarone infusion.  Received 20 mEq oral potassium supplementation, on daily magnesium supplementation  Telemetry with 2.6 sec pause, 10 beat run of V-tach  Continue Eliquis for anticoagulation  Will discuss with Cardiology if CHARLES tomorrow given now able to lie flat        JANEL (acute kidney injury)  Renal function has up trended from 1.2-->1.5.  Decreased IV Lasix to once daily.  Renally dose all medications.  Repeat BMP tomorrow.      Essential hypertension, uncontrolled  Blood pressure up to 188/96 on presentation.  Restart potassium-sparing diuretic, lisinopril, metoprolol, IV diuresis.  Check urine drug screen  Continue to monitor blood pressure and adjust as needed    Noncompliance with financial restrictions  Patient has been noncompliant with medications.  Also limited due to financial restrictions with lack of insurance.  Needs continued counseling.      Class 2 obesity in adult  BMI 36.      Type 2 diabetes mellitus  During last hospital stay was found to have new diagnosis of diabetes mellitus with HbA1c 13.  Due to insurance issues he was switched to metformin as an outpatient as he could not afford insulin.  Will hold metformin, basal bolus insulin while admitted.  Accu-Cheks  1800 calories diabetic diet with 1.5 L fluid restriction.  As needed hypoglycemic measures      Nonischemic cardiomyopathy  During last hospital stay EF of 25%.  Did have left heart catheterization with normal coronaries no evidence of CAD.  Currently on beta-blocker, potassium-sparing diuretic, Ace inhibitor.   Patient lacks medical insurance, does not have Life Vest.  If cardioversion  performed, CHARLES will re-evaluate EF.    Tobacco abuse  States he has been cutting back since recent admission.      BRI (obstructive sleep apnea)  High suspicion patient has untreated undiagnosed BRI.  At this time he does not have medical insurance and therefore not able to get outpatient sleep study for home CPAP.  He has been tolerating CPAP here while in the hospital.  Untreated BRI likely contributors to cardiac arrhythmia.      Elevated troponin  Troponin elevated at 0.192.  Had recent left heart catheterization with normal coronaries.  Possibly secondary to atrial fibrillation/tachyarrhythmia versus decompensated heart failure versus uncontrolled hypertension.        VTE Risk Mitigation (From admission, onward)         Ordered     apixaban tablet 5 mg  2 times daily      02/12/20 1199                      Keila Berrios MD  Department of Hospital Medicine   [facility]

## 2020-02-13 NOTE — H&P (VIEW-ONLY)
[facility]  Cardiology  Consult Note    Patient Name: Jarret Jessica  MRN: [id number]  Admission Date: 2/12/2020  Hospital Length of Stay: 0 days  Code Status: Full Code   Attending Provider: Keila Berrios MD   Consulting Provider: Micaela Chow NP  Primary Care Physician: Primary Doctor No  Principal Problem:Acute on chronic systolic congestive heart failure    Patient information was obtained from patient and ER records.     Inpatient consult to Cardiology  Consult performed by: Gasper Person MD  Consult ordered by: Keila Berrios MD        Subjective:     REASON FOR CONSULT:  chf     HPI: Mr. Jessica is a 54 year old male with past medical history significant for nonischemic cardiomyopathy, hypertension, atrial fibrillation, diabetes presented for complaint of shortness of breath and leg swelling worsening over the past 2-3 days.  Patient with recent [facility] hospitalization with new onset atrial fibrillation, elevated troponins with new diagnosis of systolic heart failure. Patient had a coronary angiogram which revealed non obstructive CAD. He was recently seen in our office last month and was doing better at that time.   He reports that 3 days ago, he started having some abdominal discomfort associated with constipation. He decided to stop taking his medications because he thought they could be causing him to feel bad. He then started noticing increase in lower extremity edema and shortness of breath. Reports the last 2 nights he could not sleep because of difficulty breathing and unable to lie flat on the bed so he decided to come to the ER. He denies having any chest pain. Reports yesterday he felt sensation of heart fluttering. Denies syncopal episodes. Denies any blood in the urine or stool. Denies fever or chills. Does report dry cough.   In the ED, he was noted to have atrial fibrillation with RVR. He was started on a Cardizem drip which was subsequently changed to amiodarone drip  due to poor LV function. BNP elevated to 1289. CXR with evidence of pulmonary edema. He has received Lasix 40 mg IV and remains on amiodarone drip. Presently he states the shortness of breath has improved.     Past Medical History:   Diagnosis Date    CHF (congestive heart failure)     Diabetes mellitus     Diverticulitis 2010    Hypertension     Nicotine abuse     Obesity        Past Surgical History:   Procedure Laterality Date    ANGIOGRAM, CORONARY, WITH LEFT HEART CATHETERIZATION N/A 12/30/2019    Procedure: Angiogram, Coronary, with Left Heart Cath;  Surgeon: Gasper Person MD;  Location: [facility] CATH/EP LAB;  Service: Cardiology;  Laterality: N/A;       Review of patient's allergies indicates:  No Known Allergies    No current facility-administered medications on file prior to encounter.      Current Outpatient Medications on File Prior to Encounter   Medication Sig    cloNIDine (CATAPRES) 0.1 MG tablet Take 1 tablet (0.1 mg total) by mouth every 6 (six) hours as needed (180).    magnesium oxide 500 mg Cap Take 500 mg by mouth once daily.    alcohol swabs (BD ALCOHOL SWABS) PadM Use as directed    apixaban (ELIQUIS) 5 mg Tab Take 1 tablet (5 mg total) by mouth 2 (two) times daily.    blood sugar diagnostic (TRUE METRIX GLUCOSE TEST STRIP) Strp use as directed to test blood sugar    eplerenone (INSPRA) 25 MG Tab Take 1 tablet (25 mg total) by mouth once daily.    furosemide (LASIX) 40 MG tablet Take 1 tablet (40 mg total) by mouth once daily.    lancets (TRUEPLUS LANCETS) 30 gauge Misc Use as directed to test blood sugar    lisinopril (PRINIVIL,ZESTRIL) 20 MG tablet Take 1 tablet (20 mg total) by mouth 2 (two) times daily.    metFORMIN (GLUCOPHAGE) 500 MG tablet Take 500 mg by mouth 2 (two) times daily.     metoprolol succinate (TOPROL-XL) 50 MG 24 hr tablet Take 1 tablet (50 mg total) by mouth 2 (two) times daily.    [DISCONTINUED] blood-glucose meter (TRUE METRIX GLUCOSE METER) Misc  use as directed    [DISCONTINUED] insulin detemir U-100 (LEVEMIR FLEXTOUCH) 100 unit/mL (3 mL) SubQ InPn pen Inject 10 Units into the skin every evening.    [DISCONTINUED] magnesium oxide (MAG-OX) 400 mg (241.3 mg magnesium) tablet Take 2 tablets (800 mg total) by mouth as needed (if magnesium level is 1.5-1.8 mg/dL give 800 mg every 4 hours x 2 doses).       Scheduled Meds:   apixaban  5 mg Oral BID    eplerenone  25 mg Oral Daily    furosemide (LASIX) IV  40 mg Intravenous Q12H    insulin detemir U-100  10 Units Subcutaneous QHS    lisinopril  20 mg Oral BID    magnesium oxide  400 mg Oral Daily    metoprolol succinate  50 mg Oral BID    [START ON 2/13/2020] polyethylene glycol  17 g Oral Daily     Continuous Infusions:   amiodarone in dextrose 5% 1 mg/min (02/12/20 1348)    amiodarone in dextrose 5%       PRN Meds:.acetaminophen, dextrose 50%, dextrose 50%, insulin regular, ondansetron, sodium chloride 0.9%    Family History     Problem Relation (Age of Onset)    Heart disease Father, Mother    Hypertension Mother          Tobacco Use    Smoking status: Current Every Day Smoker     Packs/day: 1.50     Years: 30.00     Pack years: 45.00     Types: Cigarettes    Smokeless tobacco: Never Used    Tobacco comment: States he has recently cut down   Substance and Sexual Activity    Alcohol use: Not Currently     Comment: Denies any current alcohol use    Drug use: No    Sexual activity: Not Currently       ROS   No significant headaches or sore throat or runny nose.   No recent changes in vision.   No recent changes in hearing.  No dysphagia or odynophagia.  Denies chest pain. Reports shortness of breath.   Denies  hemoptysis. Reports dry cough.   Denies any abdominal pain, nausea, vomiting, diarrhea. Reports constipation.    Denies any dysuria or polyuria.   Denies any fevers or chills.   Denies any recent significant weight changes.   Denies bleeding diathesis    Objective:     Vital Signs (Most  Recent):  Temp: 98 °F (36.7 °C) (02/12/20 1729)  Pulse: 90 (02/12/20 1729)  Resp: (!) 32 (02/12/20 1729)  BP: (!) 167/91 (02/12/20 1729)  SpO2: 97 % (02/12/20 1729) Vital Signs (24h Range):  Temp:  [97.9 °F (36.6 °C)-98.2 °F (36.8 °C)] 98 °F (36.7 °C)  Pulse:  [] 90  Resp:  [14-44] 32  SpO2:  [86 %-98 %] 97 %  BP: (131-202)/() 167/91     Weight: 116.6 kg (257 lb)  Body mass index is 35.84 kg/m².    SpO2: 97 %  O2 Device (Oxygen Therapy): nasal cannula      Intake/Output Summary (Last 24 hours) at 2/12/2020 1813  Last data filed at 2/12/2020 1638  Gross per 24 hour   Intake 365.67 ml   Output 1830 ml   Net -1464.33 ml       Lines/Drains/Airways     Peripheral Intravenous Line                 Peripheral IV - Single Lumen 02/12/20 1000 20 G Right Forearm less than 1 day                Physical Exam  HEENT: Normocephalic, atraumatic, PERRL, Conjunctiva pink, no scleral icterus.   CVS: S1S2+, Irregular, no murmurs, rubs or gallops, JVP: Elevated.   LUNGS: Crackles bibasilar.   ABDOMEN: Soft, NT, BS+  EXTREMITIES: No cyanosis, clubbing . 1 + edema BLE  NEURO: AAO X 3.       Significant Labs:   ABG: No results for input(s): PH, PCO2, HCO3, POCSATURATED, BE in the last 48 hours., Blood Culture: No results for input(s): LABBLOO in the last 48 hours., BMP:   Recent Labs   Lab 02/12/20  1000   *      K 4.1      CO2 26   BUN 17   CREATININE 1.3   CALCIUM 8.8   MG 1.9   , CMP   Recent Labs   Lab 02/12/20  1000      K 4.1      CO2 26   *   BUN 17   CREATININE 1.3   CALCIUM 8.8   PROT 6.0   ALBUMIN 3.3*   BILITOT 1.0   ALKPHOS 46*   AST 22   ALT 33   ANIONGAP 10   ESTGFRAFRICA >60.0   EGFRNONAA >60.0   , CBC   Recent Labs   Lab 02/12/20  1000   WBC 5.34   HGB 13.3*   HCT 42.3      , INR   Recent Labs   Lab 02/12/20  1000   INR 1.2   , Lipid Panel No results for input(s): CHOL, HDL, LDLCALC, TRIG, CHOLHDL in the last 48 hours.,   Pathology Results  (Last 10 years)                10/17/12 0000  Tissue Specimen to Pathology Final result      , Troponin   Recent Labs   Lab 02/12/20  1000   TROPONINI 0.192*    and All pertinent lab results from the last 24 hours have been reviewed.    Significant Imaging: X-Ray: CXR: X-Ray Chest 1 View (CXR): No results found for this visit on 02/12/20.  XR CHEST AP PORTABLE    CLINICAL HISTORY:  CHF;    FINDINGS:  Portable chest with comparison dated 12/29/2019.  The cardiomediastinal silhouette is enlarged.  There is prominence of the pulmonary hilar vascular structures with bilateral peribronchial cuffing.Bilateral perihilar interstitial opacities demonstrated, worst at the lung bases.  No acute osseous abnormality.      Impression       Enlarged cardiomediastinal silhouette, peribronchial cuffing, and bilateral perihilar interstitial opacities is consistent with CHF and pulmonary edema.       Assessment and Plan:     IMPRESSION:    Congestive heart failure.  Acute on chronic. Reduced LV systolic function of 25%.  Initial diagnosis 12/29/2019.  Status post left heart catheterization with normal coronaries on 12/30/2019.   Nonischemic cardiomyopathy.  Likely etiologies poorly controlled severe hypertension.  Patient cannot afford LifeVest and currently does not have insurance.  Atrial fibrillation.  Persistent.  Currently rate controlled on amiodarone drip.   Elevated troponin. Likely secondary to atrial fibrillation vs acute on chronic heart failure   Diabetes mellitus.  Patient reportedly could not afford Levemir and is not currently taking. Started on metformin.   Hypertension  Tobacco use.     RECOMMENDATIONS:  1. Continue lasix as ordered  2. Continue amiodarone drip per protocol.   3. Accurate intake and outputs.   4. CHF education discussed including foods to avoid and signs and symptoms to look out for. Medication compliance was discussed.   5. Continue Eliquis, eplerenone, metoprolol, and lisinopril.  6. Consider CHARLES/DCCV after diuresis and volume  status is optimized.      Thank you for your consult. I will follow-up with patient. Please contact us if you have any additional questions.    Micaela Chow NP  Cardiology   [facility]    I have personally seen and examined the patient. I reviewed the notes, assessments, and/or procedures performed by Ms Micaela Chow, I concur with her documentation of Jarret Jessica.

## 2020-02-14 ENCOUNTER — CLINICAL SUPPORT (OUTPATIENT)
Dept: CARDIOLOGY | Facility: HOSPITAL | Age: 55
DRG: 292 | End: 2020-02-14
Attending: INTERNAL MEDICINE

## 2020-02-14 VITALS
HEIGHT: 71 IN | DIASTOLIC BLOOD PRESSURE: 105 MMHG | SYSTOLIC BLOOD PRESSURE: 151 MMHG | WEIGHT: 257.94 LBS | OXYGEN SATURATION: 92 % | BODY MASS INDEX: 36.11 KG/M2 | TEMPERATURE: 98 F | RESPIRATION RATE: 27 BRPM | HEART RATE: 82 BPM

## 2020-02-14 PROBLEM — I50.23 ACUTE ON CHRONIC SYSTOLIC CONGESTIVE HEART FAILURE: Status: RESOLVED | Noted: 2020-02-12 | Resolved: 2020-02-14

## 2020-02-14 PROBLEM — R79.89 ELEVATED TROPONIN: Status: RESOLVED | Noted: 2019-12-29 | Resolved: 2020-02-14

## 2020-02-14 LAB
ANION GAP SERPL CALC-SCNC: 8 MMOL/L (ref 8–16)
BUN SERPL-MCNC: 26 MG/DL (ref 6–20)
CALCIUM SERPL-MCNC: 8.2 MG/DL (ref 8.7–10.5)
CHLORIDE SERPL-SCNC: 101 MMOL/L (ref 95–110)
CO2 SERPL-SCNC: 28 MMOL/L (ref 23–29)
CREAT SERPL-MCNC: 1.5 MG/DL (ref 0.5–1.4)
ERYTHROCYTE [DISTWIDTH] IN BLOOD BY AUTOMATED COUNT: 15.1 % (ref 11.5–14.5)
EST. GFR  (AFRICAN AMERICAN): 59.3 ML/MIN/1.73 M^2
EST. GFR  (NON AFRICAN AMERICAN): 51.3 ML/MIN/1.73 M^2
GLUCOSE SERPL-MCNC: 100 MG/DL (ref 70–110)
GLUCOSE SERPL-MCNC: 143 MG/DL (ref 70–110)
HCT VFR BLD AUTO: 40.4 % (ref 40–54)
HGB BLD-MCNC: 12.5 G/DL (ref 14–18)
MAGNESIUM SERPL-MCNC: 2.1 MG/DL (ref 1.6–2.6)
MCH RBC QN AUTO: 26.3 PG (ref 27–31)
MCHC RBC AUTO-ENTMCNC: 30.9 G/DL (ref 32–36)
MCV RBC AUTO: 85 FL (ref 82–98)
PHOSPHATE SERPL-MCNC: 4.4 MG/DL (ref 2.7–4.5)
PLATELET # BLD AUTO: 184 K/UL (ref 150–350)
PMV BLD AUTO: 13.1 FL (ref 9.2–12.9)
POTASSIUM SERPL-SCNC: 3.8 MMOL/L (ref 3.5–5.1)
RBC # BLD AUTO: 4.76 M/UL (ref 4.6–6.2)
SODIUM SERPL-SCNC: 137 MMOL/L (ref 136–145)
WBC # BLD AUTO: 6.24 K/UL (ref 3.9–12.7)

## 2020-02-14 PROCEDURE — 27000221 HC OXYGEN, UP TO 24 HOURS

## 2020-02-14 PROCEDURE — 25000003 PHARM REV CODE 250: Performed by: INTERNAL MEDICINE

## 2020-02-14 PROCEDURE — 85027 COMPLETE CBC AUTOMATED: CPT

## 2020-02-14 PROCEDURE — 36415 COLL VENOUS BLD VENIPUNCTURE: CPT

## 2020-02-14 PROCEDURE — 80048 BASIC METABOLIC PNL TOTAL CA: CPT

## 2020-02-14 PROCEDURE — 27000284 HC CANNULA NASAL: Performed by: ANESTHESIOLOGY

## 2020-02-14 PROCEDURE — 37000008 HC ANESTHESIA 1ST 15 MINUTES: Performed by: INTERNAL MEDICINE

## 2020-02-14 PROCEDURE — 94761 N-INVAS EAR/PLS OXIMETRY MLT: CPT

## 2020-02-14 PROCEDURE — 93321 DOPPLER ECHO F-UP/LMTD STD: CPT

## 2020-02-14 PROCEDURE — 84100 ASSAY OF PHOSPHORUS: CPT

## 2020-02-14 PROCEDURE — 63600175 PHARM REV CODE 636 W HCPCS: Performed by: INTERNAL MEDICINE

## 2020-02-14 PROCEDURE — 93005 ELECTROCARDIOGRAM TRACING: CPT

## 2020-02-14 PROCEDURE — 63600175 PHARM REV CODE 636 W HCPCS: Performed by: NURSE ANESTHETIST, CERTIFIED REGISTERED

## 2020-02-14 PROCEDURE — 83735 ASSAY OF MAGNESIUM: CPT

## 2020-02-14 PROCEDURE — 94660 CPAP INITIATION&MGMT: CPT

## 2020-02-14 PROCEDURE — 37000009 HC ANESTHESIA EA ADD 15 MINS: Performed by: INTERNAL MEDICINE

## 2020-02-14 RX ORDER — SODIUM CHLORIDE, SODIUM LACTATE, POTASSIUM CHLORIDE, CALCIUM CHLORIDE 600; 310; 30; 20 MG/100ML; MG/100ML; MG/100ML; MG/100ML
INJECTION, SOLUTION INTRAVENOUS CONTINUOUS PRN
Status: DISCONTINUED | OUTPATIENT
Start: 2020-02-14 | End: 2020-02-14

## 2020-02-14 RX ORDER — METOPROLOL SUCCINATE 50 MG/1
50 TABLET, EXTENDED RELEASE ORAL NIGHTLY
Status: DISCONTINUED | OUTPATIENT
Start: 2020-02-15 | End: 2020-02-14 | Stop reason: HOSPADM

## 2020-02-14 RX ORDER — ATROPINE SULFATE 0.1 MG/ML
INJECTION INTRAVENOUS
Status: DISCONTINUED
Start: 2020-02-14 | End: 2020-02-14 | Stop reason: HOSPADM

## 2020-02-14 RX ORDER — PROPOFOL 10 MG/ML
VIAL (ML) INTRAVENOUS
Status: DISCONTINUED | OUTPATIENT
Start: 2020-02-14 | End: 2020-02-14

## 2020-02-14 RX ORDER — EPINEPHRINE 0.1 MG/ML
INJECTION INTRAVENOUS
Status: DISCONTINUED
Start: 2020-02-14 | End: 2020-02-14 | Stop reason: HOSPADM

## 2020-02-14 RX ORDER — METOPROLOL SUCCINATE 50 MG/1
75 TABLET, EXTENDED RELEASE ORAL 2 TIMES DAILY
Qty: 60 TABLET | Refills: 2
Start: 2020-02-14 | End: 2021-01-01

## 2020-02-14 RX ORDER — CLONIDINE HYDROCHLORIDE 0.1 MG/1
0.1 TABLET ORAL 3 TIMES DAILY PRN
Qty: 30 TABLET | Refills: 2
Start: 2020-02-14 | End: 2020-01-01 | Stop reason: SDUPTHER

## 2020-02-14 RX ADMIN — PROPOFOL 20 MG: 10 INJECTION, EMULSION INTRAVENOUS at 09:02

## 2020-02-14 RX ADMIN — APIXABAN 5 MG: 5 TABLET, FILM COATED ORAL at 11:02

## 2020-02-14 RX ADMIN — AMIODARONE HYDROCHLORIDE 200 MG: 200 TABLET ORAL at 11:02

## 2020-02-14 RX ADMIN — LISINOPRIL 20 MG: 20 TABLET ORAL at 11:02

## 2020-02-14 RX ADMIN — ALPRAZOLAM 1 MG: 0.5 TABLET ORAL at 12:02

## 2020-02-14 RX ADMIN — PROPOFOL 30 MG: 10 INJECTION, EMULSION INTRAVENOUS at 09:02

## 2020-02-14 RX ADMIN — SODIUM CHLORIDE, SODIUM LACTATE, POTASSIUM CHLORIDE, AND CALCIUM CHLORIDE: .6; .31; .03; .02 INJECTION, SOLUTION INTRAVENOUS at 09:02

## 2020-02-14 RX ADMIN — EPLERENONE 25 MG: 25 TABLET, FILM COATED ORAL at 11:02

## 2020-02-14 RX ADMIN — MAGNESIUM OXIDE 400 MG: 400 TABLET ORAL at 11:02

## 2020-02-14 RX ADMIN — METOPROLOL SUCCINATE 75 MG: 50 TABLET, FILM COATED, EXTENDED RELEASE ORAL at 11:02

## 2020-02-14 RX ADMIN — FUROSEMIDE 40 MG: 10 INJECTION, SOLUTION INTRAMUSCULAR; INTRAVENOUS at 11:02

## 2020-02-14 RX ADMIN — POLYETHYLENE GLYCOL 3350 17 G: 17 POWDER, FOR SOLUTION ORAL at 09:02

## 2020-02-14 NOTE — ANESTHESIA POSTPROCEDURE EVALUATION
Anesthesia Post Evaluation    Patient: Jarret Jessica    Procedure(s) Performed: Procedure(s) (LRB):  Transesophageal echo (CHARLES) intra-procedure log documentation (N/A)    Final Anesthesia Type: MAC    Patient location during evaluation: telemetry step down  Patient participation: Yes- Able to Participate  Level of consciousness: awake and alert and oriented  Post-procedure vital signs: reviewed and stable  Pain management: adequate  Airway patency: patent    PONV status at discharge: No PONV  Anesthetic complications: no      Cardiovascular status: blood pressure returned to baseline and hemodynamically stable  Respiratory status: unassisted, spontaneous ventilation and nasal cannula  Hydration status: euvolemic  Follow-up not needed.    VSS, responding appropriate, following commands, BARROW.    No apparent anesthetic complications.        Vitals Value Taken Time   /84 2/14/2020 10:15 AM   Temp 36.7 °C (98 °F) 2/14/2020  7:50 AM   Pulse 134 2/14/2020 10:17 AM   Resp 40 2/14/2020 10:17 AM   SpO2 95 % 2/14/2020 10:17 AM   Vitals shown include unvalidated device data.      No case tracking events are documented in the log.      Pain/Noah Score: Noah Score: 6 (2/14/2020 10:00 AM)

## 2020-02-14 NOTE — PLAN OF CARE
02/14/20 1505   Discharge Reassessment   Assessment Type Discharge Planning Reassessment   Patient does not have insurance and needs metoprolol and eliquis. I gave him the 30 day free trial and 10.00 copay card to be used in that order. I spoke with Gabriela Turner D a [facility] who stated that he can get the 25 mg and 50 mg metoprolol for 10.00 per strength per month for a total of 20.00 for the month. His total cost will be 30.00 per month for these two meds after the first month. I also gave him the number for the [facility] sleeo center because him and his family wanted to check on the cash price for a sleep study. Dr Berrios and nurse, Annemarie were notified of this info.

## 2020-02-14 NOTE — TRANSFER OF CARE
"Anesthesia Transfer of Care Note    Patient: Jarret Jessica    Procedure(s) Performed: Procedure(s) (LRB):  Transesophageal echo (CHARLES) intra-procedure log documentation (N/A)    Patient location: Other: 2528    Anesthesia Type: MAC    Transport from OR: Transported from OR on 2-3 L/min O2 by NC with adequate spontaneous ventilation    Post pain: adequate analgesia    Post assessment: no apparent anesthetic complications    Post vital signs: stable    Level of consciousness: awake    Nausea/Vomiting: no nausea/vomiting    Complications: none    Transfer of care protocol was followed      Last vitals:   Visit Vitals  BP (!) 165/100   Pulse 89   Temp 36.7 °C (98 °F) (Oral)   Resp 20   Ht 5' 11" (1.803 m)   Wt 117 kg (257 lb 15 oz)   SpO2 (!) 87%   BMI 35.98 kg/m²     "

## 2020-02-14 NOTE — ASSESSMENT & PLAN NOTE
High suspicion patient has untreated undiagnosed BRI.  He has been tolerating CPAP here while in the hospital.  Untreated BRI likely contributors to cardiac arrhythmia.  Family interested in getting price of private pay sleep study.  Contact information given.

## 2020-02-14 NOTE — PLAN OF CARE
02/13/20 1945   Patient Assessment/Suction   Respiratory Effort Unlabored   Rhythm/Pattern, Respiratory pattern regular   PRE-TX-O2   O2 Device (Oxygen Therapy) CPAP   Oxygen Concentration (%) 28   SpO2 (!) 90 %   Pulse Oximetry Type Continuous   Pulse 78   Resp 20   Ready to Wean/Extubation Screen   FIO2<=50 (chart decimal) 0.28   Preset CPAP/BiPAP Settings   Mode Of Delivery CPAP   Size of Mask Large   Sized Appropriately? Yes   Equipment Type V60   Airway Device Type large full face mask   CPAP (cm H2O) 10   Patient CPAP/BiPAP Settings   RR Total (Breaths/Min) 20   Tidal Volume (mL) 528   VE Minute Ventilation (L/min) 10.4 L/min   Peak Inspiratory Pressure (cm H2O) 11   TiTOT (%) 36   Total Leak (L/Min) 14   Patient Trigger - ST Mode Only (%) 100   CPAP/BiPAP Backup Settings   EPAP Backup 10 cmH2O   FIO2 Backup 28 %   CPAP/BiPAP Alarms   High Pressure (cm H2O) 40   Low Pressure (cm H2O) 10   High RR (breaths/min) 50   Low RR (breaths/min) 10   Apnea (Sec) 20

## 2020-02-14 NOTE — INTERVAL H&P NOTE
The patient has been examined and the H&P has been reviewed:    I concur with the findings and changes have been noted since the H&P was written: Please see progress note from today.    Anesthesia/Surgery risks, benefits and alternative options discussed and understood by patient/family.          Active Hospital Problems    Diagnosis  POA    *Acute on chronic systolic congestive heart failure [I50.23]  Yes    JANEL (acute kidney injury) [N17.9]  No    Persistent atrial fibrillation [I48.19]  Yes    Essential hypertension, uncontrolled [I10]  Yes     Chronic    Nonischemic cardiomyopathy [I42.9]  Yes     Chronic    Type 2 diabetes mellitus [E11.9]  Yes     Chronic    Class 2 obesity in adult [E66.9]  Yes     Chronic    Noncompliance with financial restrictions [Z91.19]  Not Applicable     Chronic    BRI (obstructive sleep apnea) [G47.33]  Yes    Elevated troponin [R79.89]  Yes    Tobacco abuse [Z72.0]  Yes      Resolved Hospital Problems   No resolved problems to display.

## 2020-02-14 NOTE — ASSESSMENT & PLAN NOTE
During last hospital stay EF of 25%.  Did have left heart catheterization with normal coronaries no evidence of CAD.  Currently on beta-blocker, potassium-sparing diuretic, Ace inhibitor.   Patient lacks medical insurance, does not have Life Vest.

## 2020-02-14 NOTE — CARE UPDATE
2/14/2020    Patient: Jarret Jessica    YOB: 1965    To whom it may concern,    Jarret Jessica was admitted to the hospital on 2/12/2020 and was discharged on 2/14/2020. Patient was under my care at the hospital and is cleared to return to work on 2/24/20. If you have any questions or concerns, please do not hesitate to contact the department of hospital medicine at [facility] at [phone].     Sincerely,    Keila Berrios MD    Department of Hospital Medicine

## 2020-02-14 NOTE — ASSESSMENT & PLAN NOTE
Patient has been noncompliant with medications.  Also limited due to financial restrictions with lack of insurance.   was consulted to assist.  Discussed with patient and mother in detail.

## 2020-02-14 NOTE — ASSESSMENT & PLAN NOTE
New onset atrial fibrillation during last hospitalized stay  Permanent atrial fibrillation, he was not cardioverted, now with RVR likely secondary to HF and noncompliance with medication.  Given EF of 25%. Patient had bradycardia with digoxin previously  CHARLES demonstrated right atrial appendix and therefore unable to do cardioversion.  Increased dose of Toprol on discharge. Reinforced need to be compliant with Eliquis.

## 2020-02-14 NOTE — ASSESSMENT & PLAN NOTE
During last hospital stay was found to have new diagnosis of diabetes mellitus with HbA1c 13.  Due to insurance issues he was switched to metformin as an outpatient as he could not afford insulin.

## 2020-02-14 NOTE — PROGRESS NOTES
[facility]  Cardiology  Progress Note    Patient Name: Jarret Jessica  MRN: [id number]  Admission Date: 2/12/2020  Hospital Length of Stay: 2 days  Code Status: Full Code   Attending Physician: Keila Berrios MD   Primary Care Physician: Primary Doctor No  Expected Discharge Date:   Principal Problem:Acute on chronic systolic congestive heart failure    Subjective:       Interval History:  Denies any chest pain.  Shortness of breath is better.  Remains in atrial fibrillation. RVR with minimal exertion.     ROS   Denies any focal weakness.  Denies any dizziness.  Denies any bleeding issues.  Objective:     Vital Signs (Most Recent):  Temp: 97.8 °F (36.6 °C) (02/13/20 2300)  Pulse: 78 (02/14/20 0300)  Resp: (!) 24 (02/14/20 0300)  BP: 128/77 (02/14/20 0300)  SpO2: (!) 93 % (02/14/20 0300) Vital Signs (24h Range):  Temp:  [97.8 °F (36.6 °C)-98 °F (36.7 °C)] 97.8 °F (36.6 °C)  Pulse:  [71-97] 78  Resp:  [14-24] 24  SpO2:  [87 %-96 %] 93 %  BP: ()/(62-83) 128/77     Weight: 117 kg (257 lb 15 oz)  Body mass index is 35.98 kg/m².    SpO2: (!) 93 %  O2 Device (Oxygen Therapy): nasal cannula      Intake/Output Summary (Last 24 hours) at 2/14/2020 0727  Last data filed at 2/14/2020 0700  Gross per 24 hour   Intake 240 ml   Output 1100 ml   Net -860 ml       Lines/Drains/Airways     Peripheral Intravenous Line                 Peripheral IV - Single Lumen 02/12/20 1000 20 G Right Forearm 1 day                Scheduled Meds:   amiodarone  200 mg Oral TID    apixaban  5 mg Oral BID    eplerenone  25 mg Oral Daily    furosemide (LASIX) IV  40 mg Intravenous Daily    insulin detemir U-100  10 Units Subcutaneous QHS    lisinopril  20 mg Oral BID    magnesium oxide  400 mg Oral Daily    metoprolol succinate  50 mg Oral BID    polyethylene glycol  17 g Oral Daily     Continuous Infusions:    PRN Meds:.acetaminophen, ALPRAZolam, calcium chloride IVPB, calcium chloride IVPB, calcium chloride IVPB, dextrose  50%, dextrose 50%, insulin regular, magnesium oxide, magnesium sulfate IVPB, magnesium sulfate IVPB, magnesium sulfate IVPB, magnesium sulfate IVPB, ondansetron, potassium chloride in water, potassium chloride in water, potassium chloride in water, potassium chloride in water, potassium chloride, potassium chloride, potassium chloride, potassium chloride, sodium chloride 0.9%, sodium phosphate IVPB, sodium phosphate IVPB, sodium phosphate IVPB, sodium phosphate IVPB, sodium phosphate IVPB     Physical Exam  HEENT: Normocephalic, atraumatic, PERRL, Conjunctiva pink, no scleral icterus.   CVS: S1S2+, irregular, no murmurs, rubs or gallops, JVP: Normal.  LUNGS: Clear  ABDOMEN: Soft, NT, BS+  EXTREMITIES: No cyanosis, clubbing or edema  NEURO: AAO X 3.       Significant Labs:   BMP:   Recent Labs   Lab 02/12/20 1000 02/13/20 0453 02/14/20  0509   * 155* 143*    139 137   K 4.1 3.6 3.8    104 101   CO2 26 28 28   BUN 17 19 26*   CREATININE 1.3 1.5* 1.5*   CALCIUM 8.8 8.3* 8.2*   MG 1.9 1.9 2.1   , CMP   Recent Labs   Lab 02/12/20 1000 02/13/20 0453 02/14/20  0509    139 137   K 4.1 3.6 3.8    104 101   CO2 26 28 28   * 155* 143*   BUN 17 19 26*   CREATININE 1.3 1.5* 1.5*   CALCIUM 8.8 8.3* 8.2*   PROT 6.0  --   --    ALBUMIN 3.3*  --   --    BILITOT 1.0  --   --    ALKPHOS 46*  --   --    AST 22  --   --    ALT 33  --   --    ANIONGAP 10 7* 8   ESTGFRAFRICA >60.0 59.3* 59.3*   EGFRNONAA >60.0 51.3* 51.3*   , CBC   Recent Labs   Lab 02/12/20 1000 02/13/20 0453 02/14/20  0509   WBC 5.34 5.67 6.24   HGB 13.3* 12.9* 12.5*   HCT 42.3 40.8 40.4    187 184   , INR   Recent Labs   Lab 02/12/20  1000   INR 1.2   , Lipid Panel No results for input(s): CHOL, HDL, LDLCALC, TRIG, CHOLHDL in the last 48 hours. and Troponin   Recent Labs   Lab 02/12/20  1000   TROPONINI 0.192*       Significant Imaging:   Assessment and Plan:     IMPRESSION:  Congestive heart failure.  Acute on  chronic. Reduced LV systolic function of 25%.  Initial diagnosis 12/29/2019.     Nonischemic cardiomyopathy. Status post left heart catheterization with normal coronaries on 12/30/2019. Likely etiology is poorly controlled severe hypertension.  Patient cannot afford LifeVest and currently does not have insurance.  Atrial fibrillation.  Persistent.  On amiodarone oral and Toprol. CVA prophylaxis with Eliquis.   Elevated troponin. Likely secondary to atrial fibrillation vs acute on chronic heart failure   Diabetes mellitus.  Patient reportedly could not afford Levemir and is not currently taking. On metformin.   Acute renal failure.   Hypertension  Tobacco use.   Possible obstructive sleep apnea.      PLAN:  1. Proceed with CHARLES/DCCV. Indications, risks, benefits as well as alternatives to the procedure were discussed in layman terms and informed consent was obtained.       Gasper Person MD  Cardiology  [facility]

## 2020-02-14 NOTE — ASSESSMENT & PLAN NOTE
Patient with recent admission with acute systolic heart failure requiring IV Lasix infusion.  Echocardiogram at that time with EF of 25% with severe LV dysfunction/hypokinesia.  Patient was diuresed with improvement.  Resume home Lasix.  CHF education reinforce.  Outpatient cardiology follow-up.

## 2020-02-14 NOTE — PLAN OF CARE
This note also relates to the following rows which could not be included:  SpO2 - Cannot attach notes to unvalidated device data  Pulse - Cannot attach notes to unvalidated device data  Resp - Cannot attach notes to unvalidated device data  BP - Cannot attach notes to unvalidated device data  ETCO2 (mmHg) - Cannot attach notes to unvalidated device data       02/14/20 1015   PRE-TX-O2   O2 Device (Oxygen Therapy) nasal cannula   $ Is the patient on Low Flow Oxygen? Yes   Flow (L/min) 5   Pulse Oximetry Type Continuous   $ Pulse Oximetry - Multiple Charge Pulse Oximetry - Multiple   Preset CPAP/BiPAP Settings   $ CPAP/BiPAP Daily Charge BiPAP/CPAP Daily

## 2020-02-14 NOTE — PLAN OF CARE
Plan of care reviewed with patient and significant other, vital sign monitoring, labs, cardiac monitor, amiodarone now PO, possible CHARLES/cardioversion in the AM, oxygen and CPAP therapy PRN, activity as tolerated, daily weight, strict I and O, 1.5L fluid restriction.

## 2020-02-14 NOTE — DISCHARGE SUMMARY
[facility] Medicine  Discharge Summary      Patient Name: Jarret Jessica  MRN: [id number]  Admission Date: 2/12/2020  Hospital Length of Stay: 2 days  Discharge Date and Time:  02/14/2020 4:18 PM  Attending Physician: Keila Berrios MD   Discharging Provider: Keila Berrios MD  Primary Care Provider: Primary Doctor No      HPI:   Mr. Jessica is a 54-year-old male who presented to the ED with chief complaint of shortness of breath.  Patient with recent [facility] admission as transfer from [facility] from 12/29/19-1/3/20 with new onset atrial fibrillation with RVR, during that admission was found to have elevated troponin with echocardiogram revealing severely reduced EF at 25%, left heart catheterization with nonobstructive coronary artery disease, acute systolic heart failure needing IV Lasix drip, persistent atrial fibrillation.  Patient states he did follow-up 2 weeks post hospital discharge, only changes made were discontinuation of insulin as this was too expensive for the patient to afford and initiation of metformin.  He now presents with 3 days history of progressively worsening shortness of breath, location bilateral chest, associated with intermittent cough productive of clear phlegm, severe bilateral lower extremity swelling as well as orthopnea.  He states he was not able to sleep over the last 3 days, unable to lie flat, not able to get comfortable, he thought initially this was an adverse effect of the medications he was recently placed on and therefore he self discontinued all medications including Lasix over the last 2 days.  He does not consistently check his blood pressure at home.  Has been checking his blood glucose and as per relative at bedside has been running consistently less than 200.  Has not been tracking his weight, unsure about dietary restrictions.  Patient and relative reviewed heart failure educational booklet yesterday and noted symptoms with leg  edema were concerning for heart failure and therefore seek medical attention today. In the ED he was tachycardic, atrial fibrillation, , hypertensive, /96.  Labs as per chart review WBC 5.34, H/H 13.3/42.3, platelet 194, sodium 140, potassium 4.1, BUN 17, creatinine 1.3, BNP 1 289, troponin 0.192.  Glucose 155.  Chest x-ray with cardiomegaly with pulmonary edema.  EKG atrial fibrillation with RVR with heart rate 177.  He received diltiazem 10 mg IV bolus followed by initiation of drip, aspirin 325 mg, Lasix 40 mg IV as well as topical nitro paste.  During my encounter patient had urinated multiple times.  Case discussed with ED provider.  Did also communicated with patient's cardiologist, given EF of 25%, relative contraindication to calcium channel blocker and therefore will switch to amiodarone infusion as per protocol.    Procedure(s) (LRB):  Transesophageal echo (CHARLES) intra-procedure log documentation (N/A)      Hospital Course:   Patient was admitted to the hospital with atrial fibrillation with RVR, started on amiodarone infusion.  Also acute on chronic systolic heart failure started on IV diuresis.  Cardiology consulted.  On 02/13 improvement in symptomatology, able to lie flat, rate controlled atrial fibrillation on IV amiodarone.  Cardiology planning possible DC cardioversion. On 2/14/20 he underwent CHARLES which demonstrated left atrial clot, likely secondary to patient not being fully compliant with Eliquis, does not represent failure of anticoagulation, on able to be cardioverted due to same.  Given these findings amiodarone was discontinued and metoprolol dose was increased.  High suspicion patient has undiagnosed sleep apnea which is contributing to current cardiac comorbidities also affecting sleep, lack of insurance is limiting factor however starting process of private pay if patient can afford.  Prescriptions were sent to pharmacy with cost obtained and patient expressed that he would be  able to afford these medications.  Heart failure compensated on discharge. CHF education was reinforced, signs and symptoms discussed, patient and mother expressed understanding.  Work note provided for patient.  Discharge plan including medication changes follow-up as well as return precautions explained to the patient his mother present at bedside.    Discharge examination  Lying in bed, apparent distress, cooperative  Alert and oriented times she  Irregular heart rhythm rate controlled  Not on supplemental oxygen  Near resolved lower extremity edema     Consults:   Consults (From admission, onward)        Status Ordering Provider     Inpatient consult to Anesthesiology  Once     Provider:  Jorge Jones Jr., MD    Acknowledged GASPER PERSON     Inpatient consult to Cardiology  Once     Provider:  Gasper Person MD    Completed KEILA BERRIOS     Inpatient consult to Hospitalist  Once     Provider:  Keila Berrios MD    Acknowledged KEILA BERRIOS          * Acute on chronic systolic congestive heart failure-resolved as of 2/14/2020  Patient with recent admission with acute systolic heart failure requiring IV Lasix infusion.  Echocardiogram at that time with EF of 25% with severe LV dysfunction/hypokinesia.  Patient was diuresed with improvement.  Resume home Lasix.  CHF education reinforce.  Outpatient cardiology follow-up.    Persistent atrial fibrillation  New onset atrial fibrillation during last hospitalized stay  Permanent atrial fibrillation, he was not cardioverted, now with RVR likely secondary to HF and noncompliance with medication.  Given EF of 25%. Patient had bradycardia with digoxin previously  CHARLES demonstrated right atrial appendix and therefore unable to do cardioversion.  Increased dose of Toprol on discharge. Reinforced need to be compliant with Eliquis.    JANEL (acute kidney injury)  Discharge creatinine 1.5.      Essential hypertension, uncontrolled  Resume  home medications.  P.r.n. clonidine.    Noncompliance with financial restrictions  Patient has been noncompliant with medications.  Also limited due to financial restrictions with lack of insurance.   was consulted to assist.  Discussed with patient and mother in detail.    Class 2 obesity in adult  BMI 36.      Type 2 diabetes mellitus  During last hospital stay was found to have new diagnosis of diabetes mellitus with HbA1c 13.  Due to insurance issues he was switched to metformin as an outpatient as he could not afford insulin.      Nonischemic cardiomyopathy  During last hospital stay EF of 25%.  Did have left heart catheterization with normal coronaries no evidence of CAD.  Currently on beta-blocker, potassium-sparing diuretic, Ace inhibitor.   Patient lacks medical insurance, does not have Life Vest.    Tobacco abuse  States he has been cutting back since recent admission.      BRI (obstructive sleep apnea)  High suspicion patient has untreated undiagnosed BRI.  He has been tolerating CPAP here while in the hospital.  Untreated BRI likely contributors to cardiac arrhythmia.  Family interested in getting price of private pay sleep study.  Contact information given.      Elevated troponin  Troponin elevated at 0.192.  Had recent left heart catheterization with normal coronaries.  Possibly secondary to atrial fibrillation/tachyarrhythmia versus decompensated heart failure versus uncontrolled hypertension.        Final Active Diagnoses:    Diagnosis Date Noted POA    Persistent atrial fibrillation [I48.19] 02/12/2020 Yes    JANEL (acute kidney injury) [N17.9] 02/13/2020 No    Essential hypertension, uncontrolled [I10] 02/12/2020 Yes     Chronic    Nonischemic cardiomyopathy [I42.9] 02/12/2020 Yes     Chronic    Type 2 diabetes mellitus [E11.9] 02/12/2020 Yes     Chronic    Class 2 obesity in adult [E66.9] 02/12/2020 Yes     Chronic    Noncompliance with financial restrictions [Z91.19] 02/12/2020  Not Applicable     Chronic    BRI (obstructive sleep apnea) [G47.33] 12/29/2019 Yes    Elevated troponin [R79.89] 12/29/2019 Yes    Tobacco abuse [Z72.0] 12/29/2019 Yes      Problems Resolved During this Admission:    Diagnosis Date Noted Date Resolved POA    PRINCIPAL PROBLEM:  Acute on chronic systolic congestive heart failure [I50.23] 02/12/2020 02/14/2020 Yes       Discharged Condition: good    Disposition: Home or Self Care    Follow Up:  Follow-up Information     Gasper Person MD. Schedule an appointment as soon as possible for a visit in 4 weeks.    Specialties:  Cardiovascular Disease, Cardiology  Why:  Atrial fibrillation, CHF  Contact information:  [address] [place] [address]  [address]  [facility] [id number] [phone]                 Patient Instructions:      Diet diabetic     Polysomnogram (CPAP will be added if patient meets diagnostic criteria.)   Standing Status: Future Standing Exp. Date: 04/14/20   Scheduling Instructions: Outpatient sleep study for suspected BRI- at [facility]     Activity as tolerated       Significant Diagnostic Studies: Labs:   BMP:   Recent Labs   Lab 02/13/20  0453 02/14/20  0509   * 143*    137   K 3.6 3.8    101   CO2 28 28   BUN 19 26*   CREATININE 1.5* 1.5*   CALCIUM 8.3* 8.2*   MG 1.9 2.1   , CMP   Recent Labs   Lab 02/13/20  0453 02/14/20  0509    137   K 3.6 3.8    101   CO2 28 28   * 143*   BUN 19 26*   CREATININE 1.5* 1.5*   CALCIUM 8.3* 8.2*   ANIONGAP 7* 8   ESTGFRAFRICA 59.3* 59.3*   EGFRNONAA 51.3* 51.3*   , CBC   Recent Labs   Lab 02/13/20  0453 02/14/20  0509   WBC 5.67 6.24   HGB 12.9* 12.5*   HCT 40.8 40.4    184   , INR   Lab Results   Component Value Date    INR 1.2 02/12/2020    INR 1.1 12/31/2019    INR 1.2 12/29/2019   , Lipid Panel   Lab Results   Component Value Date    CHOL 158 12/30/2019    HDL 30 (L) 12/30/2019    LDLCALC 108.4 12/30/2019    TRIG 98 12/30/2019    CHOLHDL 19.0 (L) 12/30/2019   , Troponin   Recent  Labs   Lab 02/12/20  1000   TROPONINI 0.192*    and A1C:   Recent Labs   Lab 12/29/19  1233   HGBA1C 13.3*  13.3*       Pending Diagnostic Studies:     Procedure Component Value Units Date/Time    Transesophageal echo (CHARLES) with possible cardioversion [[id number]] Resulted:  02/14/20 1058    Order Status:  Sent Lab Status:  In process Updated:  02/14/20 1059     BSA 2.42 m2          Medications:  Reconciled Home Medications:      Medication List      CHANGE how you take these medications    cloNIDine 0.1 MG tablet  Commonly known as:  CATAPRES  Take 1 tablet (0.1 mg total) by mouth 3 (three) times daily as needed (180).  What changed:  when to take this     metoprolol succinate 50 MG 24 hr tablet  Commonly known as:  TOPROL-XL  Take 1.5 tablets (75 mg total) by mouth 2 (two) times daily.  What changed:  how much to take        CONTINUE taking these medications    apixaban 5 mg Tab  Commonly known as:  ELIQUIS  Take 1 tablet (5 mg total) by mouth 2 (two) times daily.     BD Alcohol Swabs Padm  Generic drug:  alcohol swabs  Use as directed     eplerenone 25 MG Tab  Commonly known as:  INSPRA  Take 1 tablet (25 mg total) by mouth once daily.     furosemide 40 MG tablet  Commonly known as:  LASIX  Take 1 tablet (40 mg total) by mouth once daily.     lisinopril 20 MG tablet  Commonly known as:  PRINIVIL,ZESTRIL  Take 1 tablet (20 mg total) by mouth 2 (two) times daily.     magnesium oxide 500 mg Cap  Take 500 mg by mouth once daily.     metFORMIN 500 MG tablet  Commonly known as:  GLUCOPHAGE  Take 500 mg by mouth 2 (two) times daily.     True Metrix Glucose Test Strip Strp  Generic drug:  blood sugar diagnostic  use as directed to test blood sugar     TRUEplus Lancets 30 gauge Misc  Generic drug:  lancets  Use as directed to test blood sugar        X-ray Chest Ap Portable    Result Date: 2/12/2020  EXAMINATION: XR CHEST AP PORTABLE CLINICAL HISTORY: CHF; FINDINGS: Portable chest with comparison dated 12/29/2019.  The  cardiomediastinal silhouette is enlarged.  There is prominence of the pulmonary hilar vascular structures with bilateral peribronchial cuffing.Bilateral perihilar interstitial opacities demonstrated, worst at the lung bases.  No acute osseous abnormality.     Enlarged cardiomediastinal silhouette, peribronchial cuffing, and bilateral perihilar interstitial opacities is consistent with CHF and pulmonary edema. Electronically signed by: Zuhair Cruz MD Date:    02/12/2020 Time:    10:09      Indwelling Lines/Drains at time of discharge:   Lines/Drains/Airways     Airway                 Airway - Non-Surgical 02/14/20 0943 Nasal Cannula less than 1 day                Time spent on the discharge of patient: 45 minutes  Patient was seen and examined on the date of discharge and determined to be suitable for discharge.         Keila Berrios MD  Department of Hospital Medicine  [facility]

## 2020-02-17 ENCOUNTER — TELEPHONE (OUTPATIENT)
Dept: PULMONOLOGY | Facility: CLINIC | Age: 55
End: 2020-02-17

## 2020-02-17 NOTE — TELEPHONE ENCOUNTER
PT WAS DC'D FROM [facility] AND NEEDS A RX FOR CPAP SO HE CAN ATTEMPT TO RENT ONE BC HE DOES NOT HAVE INSURANCE.

## 2020-02-19 ENCOUNTER — TELEPHONE (OUTPATIENT)
Dept: PULMONOLOGY | Facility: CLINIC | Age: 55
End: 2020-02-19

## 2020-02-19 NOTE — TELEPHONE ENCOUNTER
----- Message from Darius Garcia MA sent at 2/18/2020 11:33 AM CST -----  Contact: Wife, Dahiana [phone]  I spoke to the patient yesterday, Dr Flores has never seen this patient even in the hospital. We don't even see where any pulm saw him in the hospital. He doesn't have insurance but dr flores said she can see him as a cash pay (not sure how much that is) and she can order him an AUTOPAP for him which he can buy out of pocket.  We can't do anything else unfort. He can try to call the hospital  but I told him that as well.  Only thing I did see was a sleep study ordered for him from the hospital but that's gonna cost him a couple thousand. Not sure what else we can do.   ----- Message -----  From: Aaron Cristobal  Sent: 2/18/2020  10:48 AM CST  To: Vishnu Jaffe Staff    The patients wife called stating the patient was discharged from the hospital on 2/14/20 and did not receive a CPAP machine.  He has not been able to sleep since then. The patient has an upcoming appointment on 3/25 with Dr. Flores.  Please assist and thanks in advance.

## 2020-02-19 NOTE — TELEPHONE ENCOUNTER
Nurse Mccoy spoke to patient in regards to CPAP machine.  Patient is aware of cash pay, and has an appointment with the doctor for 3/25/20

## 2020-03-04 LAB — BSA FOR ECHO PROCEDURE: 2.42 M2

## 2020-09-11 NOTE — TELEPHONE ENCOUNTER
----- Message from Anni Ellison LPN sent at 9/11/2020 11:38 AM CDT -----  Regarding: refill.  Needs a refill on Clonidine 0.1mg.  please send to [facility] in [place].  Patient only has 2 tablets left.  Please call patient once approved.    [phone]

## 2020-09-18 NOTE — TELEPHONE ENCOUNTER
----- Message from Kera Armas sent at 9/18/2020  4:01 PM CDT -----  Patient called in checking the status of his prescription. Patient asked to be contacted back when prescription is sent into pharmacy. Good contact #:[phone]

## 2020-09-18 NOTE — TELEPHONE ENCOUNTER
----- Message from Virgen Chu sent at 9/18/2020  8:51 AM CDT -----  Regarding: REfill  Clonidine refill pharmacy has not received it

## 2020-10-27 NOTE — TELEPHONE ENCOUNTER
----- Message from Dalia Aleman sent at 10/27/2020  8:06 AM CDT -----  Contact: self  Type:  RX Refill Request    Who Called:  patient  Refill or New Rx:  refill  RX Name and Strength:  furosemide (LASIX) 40 MG tablet  How is the patient currently taking it? (ex. 1XDay):  1XDay  Is this a 30 day or 90 day RX:  90  Preferred Pharmacy with phone number:    [id number] [place] [id number] [facility] [address]  [address] [place] [id number]  Phone: [phone] Fax: [phone]  Local or Mail Order:  local  Ordering Provider:  Dr Naya Keller Call Back Number:  [phone] (home)   Additional Information:  Thanks

## 2020-10-27 NOTE — TELEPHONE ENCOUNTER
Called pharmacy and left  for pharmacy to refill pt rx due to Kailyn garcia not working through epic. Okd by Dr. MICHAEL. Called Lasix 40mg QD #90 1 refill.

## 2020-12-19 PROBLEM — R41.82 AMS (ALTERED MENTAL STATUS): Status: ACTIVE | Noted: 2020-01-01

## 2020-12-19 PROBLEM — I63.9 CVA (CEREBRAL VASCULAR ACCIDENT): Status: ACTIVE | Noted: 2020-01-01

## 2020-12-19 PROBLEM — I50.21 ACUTE SYSTOLIC CHF (CONGESTIVE HEART FAILURE): Status: ACTIVE | Noted: 2020-02-12

## 2020-12-19 PROBLEM — I48.91 ATRIAL FIBRILLATION WITH RAPID VENTRICULAR RESPONSE: Status: ACTIVE | Noted: 2020-01-01

## 2021-01-01 ENCOUNTER — HOSPITAL ENCOUNTER (EMERGENCY)
Facility: OTHER | Age: 56
Discharge: LEFT BEFORE DISCHARGE | End: 2021-01-02
Attending: EMERGENCY MEDICINE
Payer: OTHER GOVERNMENT

## 2021-01-01 ENCOUNTER — HOSPITAL ENCOUNTER (EMERGENCY)
Facility: HOSPITAL | Age: 56
Discharge: ANOTHER HOSPITAL | End: 2021-04-13
Attending: EMERGENCY MEDICINE

## 2021-01-01 ENCOUNTER — HOSPITAL ENCOUNTER (EMERGENCY)
Facility: HOSPITAL | Age: 56
Discharge: ANOTHER HOSPITAL | End: 2021-04-12
Attending: EMERGENCY MEDICINE

## 2021-01-01 ENCOUNTER — HOSPITAL ENCOUNTER (INPATIENT)
Facility: HOSPITAL | Age: 56
LOS: 1 days | DRG: 951 | End: 2021-04-14
Attending: INTERNAL MEDICINE | Admitting: INTERNAL MEDICINE
Payer: OTHER MISCELLANEOUS

## 2021-01-01 ENCOUNTER — TELEPHONE (OUTPATIENT)
Dept: CARDIOLOGY | Facility: CLINIC | Age: 56
End: 2021-01-01

## 2021-01-01 VITALS
OXYGEN SATURATION: 52 % | RESPIRATION RATE: 11 BRPM | SYSTOLIC BLOOD PRESSURE: 180 MMHG | DIASTOLIC BLOOD PRESSURE: 126 MMHG | TEMPERATURE: 99 F | HEART RATE: 92 BPM

## 2021-01-01 VITALS
RESPIRATION RATE: 24 BRPM | DIASTOLIC BLOOD PRESSURE: 92 MMHG | HEIGHT: 71 IN | WEIGHT: 247 LBS | TEMPERATURE: 97 F | SYSTOLIC BLOOD PRESSURE: 105 MMHG | BODY MASS INDEX: 34.58 KG/M2 | OXYGEN SATURATION: 96 % | HEART RATE: 152 BPM

## 2021-01-01 VITALS
TEMPERATURE: 100 F | WEIGHT: 246.94 LBS | HEIGHT: 71 IN | SYSTOLIC BLOOD PRESSURE: 157 MMHG | OXYGEN SATURATION: 100 % | DIASTOLIC BLOOD PRESSURE: 108 MMHG | RESPIRATION RATE: 12 BRPM | HEART RATE: 125 BPM | BODY MASS INDEX: 34.57 KG/M2

## 2021-01-01 VITALS
SYSTOLIC BLOOD PRESSURE: 147 MMHG | DIASTOLIC BLOOD PRESSURE: 102 MMHG | RESPIRATION RATE: 18 BRPM | HEART RATE: 105 BPM | WEIGHT: 235 LBS | HEIGHT: 71 IN | OXYGEN SATURATION: 95 % | TEMPERATURE: 99 F | BODY MASS INDEX: 32.9 KG/M2

## 2021-01-01 DIAGNOSIS — E87.20 LACTIC ACIDOSIS: ICD-10-CM

## 2021-01-01 DIAGNOSIS — R06.02 SOB (SHORTNESS OF BREATH): ICD-10-CM

## 2021-01-01 DIAGNOSIS — R40.2430: Primary | ICD-10-CM

## 2021-01-01 DIAGNOSIS — J69.0 ASPIRATION PNEUMONIA, UNSPECIFIED ASPIRATION PNEUMONIA TYPE, UNSPECIFIED LATERALITY, UNSPECIFIED PART OF LUNG: ICD-10-CM

## 2021-01-01 DIAGNOSIS — I63.9 CVA (CEREBRAL VASCULAR ACCIDENT): ICD-10-CM

## 2021-01-01 DIAGNOSIS — I48.91 ATRIAL FIBRILLATION WITH RVR: ICD-10-CM

## 2021-01-01 DIAGNOSIS — J69.0 ASPIRATION PNEUMONIA OF LOWER LOBE, UNSPECIFIED ASPIRATION PNEUMONIA TYPE, UNSPECIFIED LATERALITY: Primary | ICD-10-CM

## 2021-01-01 DIAGNOSIS — R73.9 HYPERGLYCEMIA: ICD-10-CM

## 2021-01-01 DIAGNOSIS — R00.0 TACHYCARDIA: ICD-10-CM

## 2021-01-01 DIAGNOSIS — R41.82 ALTERED MENTAL STATUS: ICD-10-CM

## 2021-01-01 DIAGNOSIS — Z79.01 CHRONIC ANTICOAGULATION: ICD-10-CM

## 2021-01-01 DIAGNOSIS — I63.9 CEREBROVASCULAR ACCIDENT (CVA), UNSPECIFIED MECHANISM: ICD-10-CM

## 2021-01-01 DIAGNOSIS — I48.91 ATRIAL FIBRILLATION, UNSPECIFIED TYPE: ICD-10-CM

## 2021-01-01 DIAGNOSIS — I62.9 INTRACRANIAL HEMORRHAGE: ICD-10-CM

## 2021-01-01 LAB
ABO + RH BLD: NORMAL
ACETONE BLD-MCNC: NEGATIVE MG/DL
ALBUMIN SERPL BCP-MCNC: 2.9 G/DL (ref 3.5–5.2)
ALBUMIN SERPL BCP-MCNC: 3.3 G/DL (ref 3.5–5.2)
ALLENS TEST: ABNORMAL
ALP SERPL-CCNC: 63 U/L (ref 55–135)
ALP SERPL-CCNC: 81 U/L (ref 55–135)
ALT SERPL W/O P-5'-P-CCNC: 15 U/L (ref 10–44)
ALT SERPL W/O P-5'-P-CCNC: 20 U/L (ref 10–44)
AMPHET+METHAMPHET UR QL: NEGATIVE
ANION GAP SERPL CALC-SCNC: 10 MMOL/L (ref 8–16)
ANION GAP SERPL CALC-SCNC: 16 MMOL/L (ref 8–16)
APTT BLDCRRT: 23 SEC (ref 21–32)
AST SERPL-CCNC: 20 U/L (ref 10–40)
AST SERPL-CCNC: 37 U/L (ref 10–40)
BACTERIA #/AREA URNS HPF: ABNORMAL /HPF
BACTERIA BLD CULT: NORMAL
BACTERIA BLD CULT: NORMAL
BARBITURATES UR QL SCN>200 NG/ML: NEGATIVE
BASOPHILS # BLD AUTO: 0.01 K/UL (ref 0–0.2)
BASOPHILS # BLD AUTO: 0.03 K/UL (ref 0–0.2)
BASOPHILS NFR BLD: 0.1 % (ref 0–1.9)
BASOPHILS NFR BLD: 0.4 % (ref 0–1.9)
BENZODIAZ UR QL SCN>200 NG/ML: NEGATIVE
BILIRUB SERPL-MCNC: 1 MG/DL (ref 0.1–1)
BILIRUB SERPL-MCNC: 1.8 MG/DL (ref 0.1–1)
BILIRUB UR QL STRIP: ABNORMAL
BLD GP AB SCN CELLS X3 SERPL QL: NORMAL
BNP SERPL-MCNC: 1622 PG/ML (ref 0–99)
BNP SERPL-MCNC: 2062 PG/ML (ref 0–99)
BUN SERPL-MCNC: 17 MG/DL (ref 6–20)
BUN SERPL-MCNC: 28 MG/DL (ref 6–20)
BUN SERPL-MCNC: 28 MG/DL (ref 6–30)
BZE UR QL SCN: NEGATIVE
CALCIUM SERPL-MCNC: 8.7 MG/DL (ref 8.7–10.5)
CALCIUM SERPL-MCNC: 8.9 MG/DL (ref 8.7–10.5)
CANNABINOIDS UR QL SCN: NORMAL
CHLORIDE SERPL-SCNC: 100 MMOL/L (ref 95–110)
CHLORIDE SERPL-SCNC: 103 MMOL/L (ref 95–110)
CHLORIDE SERPL-SCNC: 97 MMOL/L (ref 95–110)
CK SERPL-CCNC: 744 U/L (ref 20–200)
CLARITY UR: CLEAR
CO2 SERPL-SCNC: 24 MMOL/L (ref 23–29)
CO2 SERPL-SCNC: 25 MMOL/L (ref 23–29)
COLOR UR: YELLOW
CREAT SERPL-MCNC: 1.3 MG/DL (ref 0.5–1.4)
CREAT SERPL-MCNC: 1.4 MG/DL (ref 0.5–1.4)
CREAT SERPL-MCNC: 1.5 MG/DL (ref 0.5–1.4)
CREAT UR-MCNC: 146.2 MG/DL (ref 23–375)
CTP QC/QA: YES
DELSYS: ABNORMAL
DIFFERENTIAL METHOD: ABNORMAL
DIFFERENTIAL METHOD: ABNORMAL
EOSINOPHIL # BLD AUTO: 0 K/UL (ref 0–0.5)
EOSINOPHIL # BLD AUTO: 0 K/UL (ref 0–0.5)
EOSINOPHIL NFR BLD: 0 % (ref 0–8)
EOSINOPHIL NFR BLD: 0.4 % (ref 0–8)
ERYTHROCYTE [DISTWIDTH] IN BLOOD BY AUTOMATED COUNT: 14.5 % (ref 11.5–14.5)
ERYTHROCYTE [DISTWIDTH] IN BLOOD BY AUTOMATED COUNT: 15.1 % (ref 11.5–14.5)
EST. GFR  (AFRICAN AMERICAN): 59 ML/MIN/1.73 M^2
EST. GFR  (AFRICAN AMERICAN): >60 ML/MIN/1.73 M^2
EST. GFR  (NON AFRICAN AMERICAN): 51 ML/MIN/1.73 M^2
EST. GFR  (NON AFRICAN AMERICAN): >60 ML/MIN/1.73 M^2
ETHANOL SERPL-MCNC: <5 MG/DL
GLUCOSE SERPL-MCNC: 324 MG/DL (ref 70–110)
GLUCOSE SERPL-MCNC: 398 MG/DL (ref 70–110)
GLUCOSE SERPL-MCNC: 423 MG/DL (ref 70–110)
GLUCOSE UR QL STRIP: ABNORMAL
GRAN CASTS #/AREA URNS LPF: 10 /LPF
HCO3 UR-SCNC: 22.7 MMOL/L (ref 24–28)
HCT VFR BLD AUTO: 39.9 % (ref 40–54)
HCT VFR BLD AUTO: 42.7 % (ref 40–54)
HCT VFR BLD CALC: 38 %PCV (ref 36–54)
HGB BLD-MCNC: 12.8 G/DL (ref 14–18)
HGB BLD-MCNC: 13.7 G/DL (ref 14–18)
HGB UR QL STRIP: ABNORMAL
HYALINE CASTS #/AREA URNS LPF: 12 /LPF
IMM GRANULOCYTES # BLD AUTO: 0.03 K/UL (ref 0–0.04)
IMM GRANULOCYTES # BLD AUTO: 0.03 K/UL (ref 0–0.04)
IMM GRANULOCYTES NFR BLD AUTO: 0.2 % (ref 0–0.5)
IMM GRANULOCYTES NFR BLD AUTO: 0.4 % (ref 0–0.5)
INR PPP: 1.1 (ref 0.8–1.2)
KETONES UR QL STRIP: NEGATIVE
LACTATE SERPL-SCNC: 1.1 MMOL/L (ref 0.5–2.2)
LACTATE SERPL-SCNC: 4.2 MMOL/L (ref 0.5–2.2)
LEUKOCYTE ESTERASE UR QL STRIP: NEGATIVE
LIPASE SERPL-CCNC: 25 U/L (ref 4–60)
LYMPHOCYTES # BLD AUTO: 0.4 K/UL (ref 1–4.8)
LYMPHOCYTES # BLD AUTO: 1 K/UL (ref 1–4.8)
LYMPHOCYTES NFR BLD: 11.8 % (ref 18–48)
LYMPHOCYTES NFR BLD: 3.3 % (ref 18–48)
MCH RBC QN AUTO: 26.6 PG (ref 27–31)
MCH RBC QN AUTO: 27.9 PG (ref 27–31)
MCHC RBC AUTO-ENTMCNC: 32.1 G/DL (ref 32–36)
MCHC RBC AUTO-ENTMCNC: 32.1 G/DL (ref 32–36)
MCV RBC AUTO: 83 FL (ref 82–98)
MCV RBC AUTO: 87 FL (ref 82–98)
MICROSCOPIC COMMENT: ABNORMAL
MODE: ABNORMAL
MONOCYTES # BLD AUTO: 0.5 K/UL (ref 0.3–1)
MONOCYTES # BLD AUTO: 0.6 K/UL (ref 0.3–1)
MONOCYTES NFR BLD: 3.8 % (ref 4–15)
MONOCYTES NFR BLD: 7.5 % (ref 4–15)
NEUTROPHILS # BLD AUTO: 11.3 K/UL (ref 1.8–7.7)
NEUTROPHILS # BLD AUTO: 6.7 K/UL (ref 1.8–7.7)
NEUTROPHILS NFR BLD: 79.5 % (ref 38–73)
NEUTROPHILS NFR BLD: 92.6 % (ref 38–73)
NITRITE UR QL STRIP: NEGATIVE
NRBC BLD-RTO: 0 /100 WBC
NRBC BLD-RTO: 0 /100 WBC
OPIATES UR QL SCN: NEGATIVE
PCO2 BLDA: 35 MMHG (ref 35–45)
PCP UR QL SCN>25 NG/ML: NEGATIVE
PH SMN: 7.42 [PH] (ref 7.35–7.45)
PH UR STRIP: 5 [PH] (ref 5–8)
PLATELET # BLD AUTO: 165 K/UL (ref 150–350)
PLATELET # BLD AUTO: 195 K/UL (ref 150–450)
PMV BLD AUTO: 12.6 FL (ref 9.2–12.9)
PMV BLD AUTO: 13.8 FL (ref 9.2–12.9)
PO2 BLDA: 124 MMHG (ref 80–100)
POC BE: -2 MMOL/L
POC IONIZED CALCIUM: 1.09 MMOL/L (ref 1.06–1.42)
POC MOLECULAR INFLUENZA A AGN: NEGATIVE
POC MOLECULAR INFLUENZA B AGN: NEGATIVE
POC SATURATED O2: 99 % (ref 95–100)
POC TCO2 (MEASURED): 26 MMOL/L (ref 23–29)
POC TCO2: 24 MMOL/L (ref 23–27)
POCT GLUCOSE: 375 MG/DL (ref 70–110)
POCT GLUCOSE: 445 MG/DL (ref 70–110)
POTASSIUM BLD-SCNC: 3.9 MMOL/L (ref 3.5–5.1)
POTASSIUM SERPL-SCNC: 3.9 MMOL/L (ref 3.5–5.1)
POTASSIUM SERPL-SCNC: 3.9 MMOL/L (ref 3.5–5.1)
PROCALCITONIN SERPL IA-MCNC: 0.14 NG/ML
PROT SERPL-MCNC: 6.4 G/DL (ref 6–8.4)
PROT SERPL-MCNC: 6.5 G/DL (ref 6–8.4)
PROT UR QL STRIP: ABNORMAL
PROTHROMBIN TIME: 11.8 SEC (ref 9–12.5)
RBC # BLD AUTO: 4.58 M/UL (ref 4.6–6.2)
RBC # BLD AUTO: 5.15 M/UL (ref 4.6–6.2)
RBC #/AREA URNS HPF: 3 /HPF (ref 0–4)
SAMPLE: ABNORMAL
SAMPLE: ABNORMAL
SARS-COV-2 RDRP RESP QL NAA+PROBE: NEGATIVE
SITE: ABNORMAL
SODIUM BLD-SCNC: 141 MMOL/L (ref 136–145)
SODIUM SERPL-SCNC: 132 MMOL/L (ref 136–145)
SODIUM SERPL-SCNC: 140 MMOL/L (ref 136–145)
SP GR UR STRIP: 1.02 (ref 1–1.03)
TOXICOLOGY INFORMATION: NORMAL
TROPONIN I SERPL DL<=0.01 NG/ML-MCNC: 0.4 NG/ML (ref 0.02–0.5)
TROPONIN I SERPL DL<=0.01 NG/ML-MCNC: 0.4 NG/ML (ref 0–0.03)
URN SPEC COLLECT METH UR: ABNORMAL
UROBILINOGEN UR STRIP-ACNC: NEGATIVE EU/DL
WBC # BLD AUTO: 12.25 K/UL (ref 3.9–12.7)
WBC # BLD AUTO: 8.41 K/UL (ref 3.9–12.7)
WBC #/AREA URNS HPF: 1 /HPF (ref 0–5)

## 2021-01-01 PROCEDURE — 63600175 PHARM REV CODE 636 W HCPCS: Performed by: EMERGENCY MEDICINE

## 2021-01-01 PROCEDURE — 96376 TX/PRO/DX INJ SAME DRUG ADON: CPT | Mod: 59

## 2021-01-01 PROCEDURE — 99900035 HC TECH TIME PER 15 MIN (STAT)

## 2021-01-01 PROCEDURE — 96365 THER/PROPH/DIAG IV INF INIT: CPT | Mod: 59

## 2021-01-01 PROCEDURE — 82077 ASSAY SPEC XCP UR&BREATH IA: CPT | Performed by: EMERGENCY MEDICINE

## 2021-01-01 PROCEDURE — 25500020 PHARM REV CODE 255: Performed by: EMERGENCY MEDICINE

## 2021-01-01 PROCEDURE — 94002 VENT MGMT INPAT INIT DAY: CPT

## 2021-01-01 PROCEDURE — 99285 EMERGENCY DEPT VISIT HI MDM: CPT | Mod: 25

## 2021-01-01 PROCEDURE — 83605 ASSAY OF LACTIC ACID: CPT

## 2021-01-01 PROCEDURE — 99285 PR EMERGENCY DEPT VISIT,LEVEL V: ICD-10-PCS | Mod: ,,, | Performed by: PSYCHIATRY & NEUROLOGY

## 2021-01-01 PROCEDURE — 99253 IP/OBS CNSLTJ NEW/EST LOW 45: CPT | Mod: ,,, | Performed by: PHYSICIAN ASSISTANT

## 2021-01-01 PROCEDURE — 94760 N-INVAS EAR/PLS OXIMETRY 1: CPT

## 2021-01-01 PROCEDURE — 85025 COMPLETE CBC W/AUTO DIFF WBC: CPT | Performed by: EMERGENCY MEDICINE

## 2021-01-01 PROCEDURE — 80047 BASIC METABLC PNL IONIZED CA: CPT

## 2021-01-01 PROCEDURE — 87040 BLOOD CULTURE FOR BACTERIA: CPT | Performed by: EMERGENCY MEDICINE

## 2021-01-01 PROCEDURE — 25000003 PHARM REV CODE 250: Performed by: INTERNAL MEDICINE

## 2021-01-01 PROCEDURE — 25000003 PHARM REV CODE 250: Performed by: PHYSICIAN ASSISTANT

## 2021-01-01 PROCEDURE — 83880 ASSAY OF NATRIURETIC PEPTIDE: CPT | Performed by: EMERGENCY MEDICINE

## 2021-01-01 PROCEDURE — 99291 CRITICAL CARE FIRST HOUR: CPT | Mod: ,,, | Performed by: EMERGENCY MEDICINE

## 2021-01-01 PROCEDURE — 94761 N-INVAS EAR/PLS OXIMETRY MLT: CPT

## 2021-01-01 PROCEDURE — 81000 URINALYSIS NONAUTO W/SCOPE: CPT | Mod: 59 | Performed by: EMERGENCY MEDICINE

## 2021-01-01 PROCEDURE — 85025 COMPLETE CBC W/AUTO DIFF WBC: CPT

## 2021-01-01 PROCEDURE — 96361 HYDRATE IV INFUSION ADD-ON: CPT

## 2021-01-01 PROCEDURE — 70450 CT HEAD/BRAIN W/O DYE: CPT | Mod: TC

## 2021-01-01 PROCEDURE — 36415 COLL VENOUS BLD VENIPUNCTURE: CPT | Performed by: EMERGENCY MEDICINE

## 2021-01-01 PROCEDURE — 85610 PROTHROMBIN TIME: CPT | Performed by: EMERGENCY MEDICINE

## 2021-01-01 PROCEDURE — 96366 THER/PROPH/DIAG IV INF ADDON: CPT | Mod: 59

## 2021-01-01 PROCEDURE — 99900026 HC AIRWAY MAINTENANCE (STAT)

## 2021-01-01 PROCEDURE — 82803 BLOOD GASES ANY COMBINATION: CPT

## 2021-01-01 PROCEDURE — 96367 TX/PROPH/DG ADDL SEQ IV INF: CPT

## 2021-01-01 PROCEDURE — 71045 X-RAY EXAM CHEST 1 VIEW: CPT | Mod: TC,FY

## 2021-01-01 PROCEDURE — 27100108

## 2021-01-01 PROCEDURE — 70450 CT HEAD WITHOUT CONTRAST: ICD-10-PCS | Mod: 26,,, | Performed by: RADIOLOGY

## 2021-01-01 PROCEDURE — 82962 GLUCOSE BLOOD TEST: CPT

## 2021-01-01 PROCEDURE — 20600001 HC STEP DOWN PRIVATE ROOM

## 2021-01-01 PROCEDURE — 25000003 PHARM REV CODE 250: Performed by: EMERGENCY MEDICINE

## 2021-01-01 PROCEDURE — 93010 ELECTROCARDIOGRAM REPORT: CPT | Mod: ,,, | Performed by: INTERNAL MEDICINE

## 2021-01-01 PROCEDURE — 70450 CT HEAD/BRAIN W/O DYE: CPT | Mod: 26,,, | Performed by: RADIOLOGY

## 2021-01-01 PROCEDURE — A4217 STERILE WATER/SALINE, 500 ML: HCPCS | Performed by: PHYSICIAN ASSISTANT

## 2021-01-01 PROCEDURE — 85730 THROMBOPLASTIN TIME PARTIAL: CPT | Performed by: EMERGENCY MEDICINE

## 2021-01-01 PROCEDURE — 80307 DRUG TEST PRSMV CHEM ANLYZR: CPT | Performed by: EMERGENCY MEDICINE

## 2021-01-01 PROCEDURE — 71045 XR CHEST AP PORTABLE: ICD-10-PCS | Mod: 26,,, | Performed by: RADIOLOGY

## 2021-01-01 PROCEDURE — 27000221 HC OXYGEN, UP TO 24 HOURS

## 2021-01-01 PROCEDURE — 83880 ASSAY OF NATRIURETIC PEPTIDE: CPT

## 2021-01-01 PROCEDURE — 83690 ASSAY OF LIPASE: CPT | Performed by: EMERGENCY MEDICINE

## 2021-01-01 PROCEDURE — 96375 TX/PRO/DX INJ NEW DRUG ADDON: CPT

## 2021-01-01 PROCEDURE — 27200966 HC CLOSED SUCTION SYSTEM

## 2021-01-01 PROCEDURE — 31500 INSERT EMERGENCY AIRWAY: CPT

## 2021-01-01 PROCEDURE — 80053 COMPREHEN METABOLIC PANEL: CPT

## 2021-01-01 PROCEDURE — 84484 ASSAY OF TROPONIN QUANT: CPT

## 2021-01-01 PROCEDURE — 83605 ASSAY OF LACTIC ACID: CPT | Performed by: EMERGENCY MEDICINE

## 2021-01-01 PROCEDURE — 84145 PROCALCITONIN (PCT): CPT | Performed by: EMERGENCY MEDICINE

## 2021-01-01 PROCEDURE — 51702 INSERT TEMP BLADDER CATH: CPT

## 2021-01-01 PROCEDURE — 86900 BLOOD TYPING SEROLOGIC ABO: CPT | Performed by: EMERGENCY MEDICINE

## 2021-01-01 PROCEDURE — 99285 EMERGENCY DEPT VISIT HI MDM: CPT | Mod: ,,, | Performed by: PSYCHIATRY & NEUROLOGY

## 2021-01-01 PROCEDURE — 93010 EKG 12-LEAD: ICD-10-PCS | Mod: ,,, | Performed by: INTERNAL MEDICINE

## 2021-01-01 PROCEDURE — 43752 NASAL/OROGASTRIC W/TUBE PLMT: CPT

## 2021-01-01 PROCEDURE — 94003 VENT MGMT INPAT SUBQ DAY: CPT

## 2021-01-01 PROCEDURE — 93005 ELECTROCARDIOGRAM TRACING: CPT

## 2021-01-01 PROCEDURE — 36600 WITHDRAWAL OF ARTERIAL BLOOD: CPT

## 2021-01-01 PROCEDURE — 99291 PR CRITICAL CARE, E/M 30-74 MINUTES: ICD-10-PCS | Mod: ,,, | Performed by: EMERGENCY MEDICINE

## 2021-01-01 PROCEDURE — 63600175 PHARM REV CODE 636 W HCPCS: Performed by: INTERNAL MEDICINE

## 2021-01-01 PROCEDURE — 82009 KETONE BODYS QUAL: CPT | Performed by: EMERGENCY MEDICINE

## 2021-01-01 PROCEDURE — U0002 COVID-19 LAB TEST NON-CDC: HCPCS | Performed by: EMERGENCY MEDICINE

## 2021-01-01 PROCEDURE — 96366 THER/PROPH/DIAG IV INF ADDON: CPT

## 2021-01-01 PROCEDURE — 99291 CRITICAL CARE FIRST HOUR: CPT | Mod: 25

## 2021-01-01 PROCEDURE — 99292 CRITICAL CARE ADDL 30 MIN: CPT

## 2021-01-01 PROCEDURE — 80053 COMPREHEN METABOLIC PANEL: CPT | Performed by: EMERGENCY MEDICINE

## 2021-01-01 PROCEDURE — 82550 ASSAY OF CK (CPK): CPT | Performed by: EMERGENCY MEDICINE

## 2021-01-01 PROCEDURE — 84484 ASSAY OF TROPONIN QUANT: CPT | Performed by: EMERGENCY MEDICINE

## 2021-01-01 PROCEDURE — 96376 TX/PRO/DX INJ SAME DRUG ADON: CPT

## 2021-01-01 PROCEDURE — 87040 BLOOD CULTURE FOR BACTERIA: CPT

## 2021-01-01 PROCEDURE — 96374 THER/PROPH/DIAG INJ IV PUSH: CPT

## 2021-01-01 PROCEDURE — 99253 PR INITIAL INPATIENT CONSULT,LEVL III: ICD-10-PCS | Mod: ,,, | Performed by: PHYSICIAN ASSISTANT

## 2021-01-01 PROCEDURE — 71045 X-RAY EXAM CHEST 1 VIEW: CPT | Mod: 26,,, | Performed by: RADIOLOGY

## 2021-01-01 PROCEDURE — 63600175 PHARM REV CODE 636 W HCPCS: Performed by: PHYSICIAN ASSISTANT

## 2021-01-01 RX ORDER — PROPOFOL 10 MG/ML
0-50 INJECTION, EMULSION INTRAVENOUS CONTINUOUS
Status: DISCONTINUED | OUTPATIENT
Start: 2021-01-01 | End: 2021-01-01 | Stop reason: HOSPADM

## 2021-01-01 RX ORDER — LORAZEPAM 2 MG/ML
1 INJECTION INTRAMUSCULAR EVERY 30 MIN PRN
Status: DISCONTINUED | OUTPATIENT
Start: 2021-01-01 | End: 2021-01-01 | Stop reason: HOSPADM

## 2021-01-01 RX ORDER — MORPHINE SULFATE 2 MG/ML
2 INJECTION, SOLUTION INTRAMUSCULAR; INTRAVENOUS EVERY 30 MIN PRN
Status: DISCONTINUED | OUTPATIENT
Start: 2021-01-01 | End: 2021-01-01 | Stop reason: HOSPADM

## 2021-01-01 RX ORDER — SUCCINYLCHOLINE CHLORIDE 20 MG/ML
100 INJECTION INTRAMUSCULAR; INTRAVENOUS
Status: COMPLETED | OUTPATIENT
Start: 2021-01-01 | End: 2021-01-01

## 2021-01-01 RX ORDER — MORPHINE SULFATE 4 MG/ML
INJECTION, SOLUTION INTRAMUSCULAR; INTRAVENOUS
Status: DISPENSED
Start: 2021-01-01 | End: 2021-01-01

## 2021-01-01 RX ORDER — HYDROMORPHONE HYDROCHLORIDE 1 MG/ML
1 INJECTION, SOLUTION INTRAMUSCULAR; INTRAVENOUS; SUBCUTANEOUS
Status: COMPLETED | OUTPATIENT
Start: 2021-01-01 | End: 2021-01-01

## 2021-01-01 RX ORDER — MORPHINE SULFATE 2 MG/ML
6 INJECTION, SOLUTION INTRAMUSCULAR; INTRAVENOUS
Status: COMPLETED | OUTPATIENT
Start: 2021-01-01 | End: 2021-01-01

## 2021-01-01 RX ORDER — ASPIRIN 325 MG
325 TABLET, DELAYED RELEASE (ENTERIC COATED) ORAL
Status: COMPLETED | OUTPATIENT
Start: 2021-01-01 | End: 2021-01-01

## 2021-01-01 RX ORDER — METOPROLOL TARTRATE 1 MG/ML
5 INJECTION, SOLUTION INTRAVENOUS
Status: COMPLETED | OUTPATIENT
Start: 2021-01-01 | End: 2021-01-01

## 2021-01-01 RX ORDER — SODIUM CHLORIDE 9 MG/ML
INJECTION, SOLUTION INTRAVENOUS
Status: COMPLETED | OUTPATIENT
Start: 2021-01-01 | End: 2021-01-01

## 2021-01-01 RX ORDER — HALOPERIDOL 5 MG/ML
1 INJECTION INTRAMUSCULAR EVERY 4 HOURS PRN
Status: DISCONTINUED | OUTPATIENT
Start: 2021-01-01 | End: 2021-01-01 | Stop reason: HOSPADM

## 2021-01-01 RX ORDER — HYDROCODONE BITARTRATE AND ACETAMINOPHEN 500; 5 MG/1; MG/1
TABLET ORAL
Status: DISCONTINUED | OUTPATIENT
Start: 2021-01-01 | End: 2021-01-01 | Stop reason: HOSPADM

## 2021-01-01 RX ORDER — MORPHINE SULFATE 4 MG/ML
4 INJECTION, SOLUTION INTRAMUSCULAR; INTRAVENOUS
Status: COMPLETED | OUTPATIENT
Start: 2021-01-01 | End: 2021-01-01

## 2021-01-01 RX ORDER — NICARDIPINE HYDROCHLORIDE 0.2 MG/ML
INJECTION INTRAVENOUS
Status: DISPENSED
Start: 2021-01-01 | End: 2021-01-01

## 2021-01-01 RX ORDER — PROPOFOL 10 MG/ML
INJECTION, EMULSION INTRAVENOUS
Status: DISCONTINUED
Start: 2021-01-01 | End: 2021-01-01 | Stop reason: HOSPADM

## 2021-01-01 RX ORDER — ETOMIDATE 2 MG/ML
20 INJECTION INTRAVENOUS
Status: COMPLETED | OUTPATIENT
Start: 2021-01-01 | End: 2021-01-01

## 2021-01-01 RX ORDER — GLYCOPYRROLATE 0.2 MG/ML
0.2 INJECTION INTRAMUSCULAR; INTRAVENOUS ONCE
Status: COMPLETED | OUTPATIENT
Start: 2021-01-01 | End: 2021-01-01

## 2021-01-01 RX ORDER — FUROSEMIDE 10 MG/ML
80 INJECTION INTRAMUSCULAR; INTRAVENOUS
Status: COMPLETED | OUTPATIENT
Start: 2021-01-01 | End: 2021-01-01

## 2021-01-01 RX ORDER — MORPHINE SULFATE/0.9% NACL/PF 1 MG/ML
2 PLASTIC BAG, INJECTION (ML) INTRAVENOUS CONTINUOUS
Status: DISCONTINUED | OUTPATIENT
Start: 2021-01-01 | End: 2021-01-01 | Stop reason: HOSPADM

## 2021-01-01 RX ORDER — MORPHINE SULFATE 1 MG/ML
0-10 INJECTION, SOLUTION INTRAVENOUS CONTINUOUS
Status: DISCONTINUED | OUTPATIENT
Start: 2021-01-01 | End: 2021-01-01 | Stop reason: HOSPADM

## 2021-01-01 RX ORDER — GLYCOPYRROLATE 0.2 MG/ML
0.1 INJECTION INTRAMUSCULAR; INTRAVENOUS EVERY 4 HOURS PRN
Status: DISCONTINUED | OUTPATIENT
Start: 2021-01-01 | End: 2021-01-01 | Stop reason: HOSPADM

## 2021-01-01 RX ORDER — LEVETIRACETAM 10 MG/ML
1000 INJECTION INTRAVASCULAR EVERY 12 HOURS
Status: DISCONTINUED | OUTPATIENT
Start: 2021-01-01 | End: 2021-01-01 | Stop reason: HOSPADM

## 2021-01-01 RX ORDER — PROCAINAMIDE HYDROCHLORIDE 100 MG/ML
17 INJECTION INTRAMUSCULAR; INTRAVENOUS
Status: DISCONTINUED | OUTPATIENT
Start: 2021-01-01 | End: 2021-01-01

## 2021-01-01 RX ORDER — MORPHINE SULFATE 2 MG/ML
1 INJECTION, SOLUTION INTRAMUSCULAR; INTRAVENOUS
Status: DISCONTINUED | OUTPATIENT
Start: 2021-01-01 | End: 2021-01-01 | Stop reason: HOSPADM

## 2021-01-01 RX ORDER — ACETAMINOPHEN 650 MG/1
650 SUPPOSITORY RECTAL EVERY 6 HOURS PRN
Status: DISCONTINUED | OUTPATIENT
Start: 2021-01-01 | End: 2021-01-01 | Stop reason: HOSPADM

## 2021-01-01 RX ORDER — CLONIDINE HYDROCHLORIDE 0.1 MG/1
TABLET ORAL
Qty: 30 TABLET | Refills: 1 | Status: SHIPPED | OUTPATIENT
Start: 2021-01-01

## 2021-01-01 RX ORDER — METOPROLOL TARTRATE 1 MG/ML
5 INJECTION, SOLUTION INTRAVENOUS ONCE
Status: COMPLETED | OUTPATIENT
Start: 2021-01-01 | End: 2021-01-01

## 2021-01-01 RX ORDER — NICARDIPINE HYDROCHLORIDE 0.2 MG/ML
0-15 INJECTION INTRAVENOUS CONTINUOUS
Status: DISCONTINUED | OUTPATIENT
Start: 2021-01-01 | End: 2021-01-01 | Stop reason: HOSPADM

## 2021-01-01 RX ADMIN — MORPHINE SULFATE 4 MG: 4 INJECTION INTRAVENOUS at 02:04

## 2021-01-01 RX ADMIN — HYDROMORPHONE HYDROCHLORIDE 1 MG: 1 INJECTION, SOLUTION INTRAMUSCULAR; INTRAVENOUS; SUBCUTANEOUS at 04:04

## 2021-01-01 RX ADMIN — Medication 2 MG/HR: at 02:04

## 2021-01-01 RX ADMIN — MORPHINE SULFATE 4 MG: 4 INJECTION INTRAVENOUS at 09:04

## 2021-01-01 RX ADMIN — GLYCOPYRROLATE 0.1 MG: 0.2 INJECTION INTRAMUSCULAR; INTRAVENOUS at 11:04

## 2021-01-01 RX ADMIN — MORPHINE SULFATE 1 MG: 2 INJECTION, SOLUTION INTRAMUSCULAR; INTRAVENOUS at 11:04

## 2021-01-01 RX ADMIN — GLYCOPYRROLATE 0.2 MG: 0.2 INJECTION, SOLUTION INTRAMUSCULAR; INTRAVITREAL at 01:04

## 2021-01-01 RX ADMIN — PROPOFOL 50 MCG/KG/MIN: 10 INJECTION, EMULSION INTRAVENOUS at 08:04

## 2021-01-01 RX ADMIN — METOROPROLOL TARTRATE 5 MG: 5 INJECTION, SOLUTION INTRAVENOUS at 07:01

## 2021-01-01 RX ADMIN — METOROPROLOL TARTRATE 5 MG: 5 INJECTION, SOLUTION INTRAVENOUS at 04:01

## 2021-01-01 RX ADMIN — IOHEXOL 100 ML: 350 INJECTION, SOLUTION INTRAVENOUS at 06:01

## 2021-01-01 RX ADMIN — ASPIRIN 325 MG: 325 TABLET, COATED ORAL at 07:01

## 2021-01-01 RX ADMIN — LORAZEPAM 1 MG: 2 INJECTION INTRAMUSCULAR; INTRAVENOUS at 02:04

## 2021-01-01 RX ADMIN — SUCCINYLCHOLINE CHLORIDE 100 MG: 20 INJECTION, SOLUTION INTRAMUSCULAR; INTRAVENOUS; PARENTERAL at 05:04

## 2021-01-01 RX ADMIN — MORPHINE SULFATE 2 MG: 2 INJECTION, SOLUTION INTRAMUSCULAR; INTRAVENOUS at 01:04

## 2021-01-01 RX ADMIN — SODIUM CHLORIDE: 234 INJECTION, SOLUTION INTRAVENOUS at 11:04

## 2021-01-01 RX ADMIN — MORPHINE SULFATE 4 MG: 4 INJECTION INTRAVENOUS at 01:04

## 2021-01-01 RX ADMIN — ETOMIDATE 20 MG: 2 INJECTION INTRAVENOUS at 05:04

## 2021-01-01 RX ADMIN — Medication 2 MG/HR: at 11:04

## 2021-01-01 RX ADMIN — SODIUM CHLORIDE 125 ML/HR: 0.9 INJECTION, SOLUTION INTRAVENOUS at 05:01

## 2021-01-01 RX ADMIN — LORAZEPAM 1 MG: 2 INJECTION INTRAMUSCULAR; INTRAVENOUS at 01:04

## 2021-01-01 RX ADMIN — MORPHINE SULFATE 6 MG: 2 INJECTION, SOLUTION INTRAMUSCULAR; INTRAVENOUS at 03:04

## 2021-01-01 RX ADMIN — LEVETIRACETAM INJECTION 1000 MG: 10 INJECTION INTRAVENOUS at 07:04

## 2021-01-01 RX ADMIN — ERTAPENEM SODIUM 1 G: 1 INJECTION, POWDER, LYOPHILIZED, FOR SOLUTION INTRAMUSCULAR; INTRAVENOUS at 07:04

## 2021-01-01 RX ADMIN — PROPOFOL 40 MCG/KG/MIN: 10 INJECTION, EMULSION INTRAVENOUS at 09:04

## 2021-01-01 RX ADMIN — FUROSEMIDE 80 MG: 10 INJECTION, SOLUTION INTRAMUSCULAR; INTRAVENOUS at 07:04

## 2021-01-01 RX ADMIN — METOROPROLOL TARTRATE 5 MG: 5 INJECTION, SOLUTION INTRAVENOUS at 11:04

## 2021-01-01 RX ADMIN — INSULIN HUMAN 10 UNITS: 100 INJECTION, SOLUTION PARENTERAL at 07:04

## 2021-04-12 PROBLEM — G93.6 CYTOTOXIC CEREBRAL EDEMA: Status: ACTIVE | Noted: 2021-01-01

## 2021-04-12 PROBLEM — I63.412 EMBOLIC STROKE INVOLVING LEFT MIDDLE CEREBRAL ARTERY: Status: ACTIVE | Noted: 2021-01-01

## 2021-04-13 PROBLEM — I63.9 CVA (CEREBRAL VASCULAR ACCIDENT): Status: ACTIVE | Noted: 2021-01-01

## 2021-04-15 LAB
BACTERIA BLD CULT: ABNORMAL

## 2021-04-18 LAB — BACTERIA BLD CULT: NORMAL

## (undated) DEVICE — SHEATH INTRODUCER SLENDER 6FX10CM

## (undated) DEVICE — CATHETER RADIAL TIG 4.5 OPTITORQUE 5X110

## (undated) DEVICE — CATHETER RADIAL TIG 4.0 OPTITORQUE 5X110

## (undated) DEVICE — HEMOSTAT VASC BAND REG 24CM

## (undated) DEVICE — GUIDEWIRE REG. ANGLED .035X260 LAUREATE